# Patient Record
Sex: FEMALE | Race: BLACK OR AFRICAN AMERICAN | NOT HISPANIC OR LATINO | Employment: PART TIME | URBAN - METROPOLITAN AREA
[De-identification: names, ages, dates, MRNs, and addresses within clinical notes are randomized per-mention and may not be internally consistent; named-entity substitution may affect disease eponyms.]

---

## 2020-03-05 ENCOUNTER — OFFICE VISIT (OUTPATIENT)
Dept: OBGYN | Facility: CLINIC | Age: 25
End: 2020-03-05
Attending: OBSTETRICS & GYNECOLOGY
Payer: COMMERCIAL

## 2020-03-05 VITALS
SYSTOLIC BLOOD PRESSURE: 150 MMHG | DIASTOLIC BLOOD PRESSURE: 89 MMHG | WEIGHT: 246.7 LBS | BODY MASS INDEX: 39.65 KG/M2 | HEART RATE: 97 BPM | HEIGHT: 66 IN

## 2020-03-05 DIAGNOSIS — N91.4 SECONDARY OLIGOMENORRHEA: Primary | ICD-10-CM

## 2020-03-05 DIAGNOSIS — Z31.41 FERTILITY TESTING: ICD-10-CM

## 2020-03-05 PROCEDURE — G0463 HOSPITAL OUTPT CLINIC VISIT: HCPCS | Mod: ZF

## 2020-03-05 RX ORDER — MEDROXYPROGESTERONE ACETATE 10 MG
10 TABLET ORAL DAILY
Qty: 5 TABLET | Refills: 3 | Status: SHIPPED | OUTPATIENT
Start: 2020-03-05 | End: 2021-02-05

## 2020-03-05 SDOH — HEALTH STABILITY: MENTAL HEALTH: HOW OFTEN DO YOU HAVE A DRINK CONTAINING ALCOHOL?: NEVER

## 2020-03-05 ASSESSMENT — MIFFLIN-ST. JEOR: SCORE: 1890.77

## 2020-03-05 NOTE — PROGRESS NOTES
CC/HPI:   Armida Da Silva is a 25 year old P0 who presents with c/o primary infertility.   Her LMP was 2/14/2020.  She immigrated from [facility] in 9/19.  She states that her periods were regular prior to immigration.  Since 9/19 have been irregular, occuring every 2 months.  She was seen previously at her primary clinic.  She was given a progestin and did have a withdrawal bleed.  She has been  for 2 years.  She has no history of STIs, pelvic infections or surgeries.  Her  is here with her [place].  He is about 35 years old, healthy, does not have children from previous relationships.     HISTORIES:  There is no problem list on file for this patient.    Past Medical History:   Diagnosis Date     NO ACTIVE PROBLEMS      Past Surgical History:   Procedure Laterality Date     NO HISTORY OF SURGERY       No current outpatient medications on file.     No Known Allergies  Social History     Socioeconomic History     Marital status:      Spouse name: Not on file     Number of children: Not on file     Years of education: Not on file     Highest education level: Not on file   Occupational History     Not on file   Social Needs     Financial resource strain: Not on file     Food insecurity:     Worry: Not on file     Inability: Not on file     Transportation needs:     Medical: Not on file     Non-medical: Not on file   Tobacco Use     Smoking status: Never Smoker     Smokeless tobacco: Never Used   Substance and Sexual Activity     Alcohol use: Never     Frequency: Never     Drug use: Never     Sexual activity: Yes     Partners: Male     Birth control/protection: None   Lifestyle     Physical activity:     Days per week: Not on file     Minutes per session: Not on file     Stress: Not on file   Relationships     Social connections:     Talks on phone: Not on file     Gets together: Not on file     Attends Taoism service: Not on file     Active member of club or organization: Not on file     Attends meetings of  "clubs or organizations: Not on file     Relationship status: Not on file     Intimate partner violence:     Fear of current or ex partner: Not on file     Emotionally abused: Not on file     Physically abused: Not on file     Forced sexual activity: Not on file   Other Topics Concern     Not on file   Social History Narrative     Not on file     No family history on file.       Gyn Hx:   No LMP recorded. (Menstrual status: Amenorrhea).  Menses:   See HPI    Review Of Systems:  CONSTITUTIONAL: NEGATIVE for fever, chills  EYES: NEGATIVE for vision changes   RESP: NEGATIVE for significant cough or SOB  CV: NEGATIVE for chest pain, palpitations   GI: NEGATIVE for nausea, abdominal pain, heartburn, or change in bowel habits  : NEGATIVE for frequency, dysuria, or hematuria  MUSCULOSKELETAL: NEGATIVE for significant arthralgias or myalgia  NEURO: NEGATIVE for weakness, dizziness or paresthesias or headache    EXAM:  BP (!) 150/89   Pulse 97   Ht 1.676 m (5' 6\")   Wt 111.9 kg (246 lb 11.2 oz)   BMI 39.82 kg/m    Body mass index is 39.82 kg/m .    General - pleasant female in no acute distress.  Not examined      ASSESSMENT    26 y/o Luxembourger P0 with c/o irregular periods and primary infertility.  Suspect 2/2 oligo-ovulation/PCOS given clinical history, elevated BMI, elevated BP    PLAN    Day 3 and 21 labs with next cycle.  If no period by day 34-40, she can do a UPT and take Provera for a withdrawal bleed-Rx sent for Provera 10 mg daily for 5 days.  Schedule pelvic ultrasound early in a future cycle.  Plan to RTC after the above are complete to review and discuss next steps.    Martha Flores MD, FACOG      "

## 2020-03-05 NOTE — LETTER
Date:March 17, 2020      Patient was self referred, no letter generated. Do not send.        [facility] Information

## 2020-03-05 NOTE — LETTER
3/5/2020       RE: Armida Da Silva  [address]  [place] [address]     Dear Colleague,    Thank you for referring your patient, Armida Da Silva, to the [facility] at [facility]. Please see a copy of my visit note below.    CC/HPI:   Armida Da Silva is a 25 year old P0 who presents with c/o primary infertility.   Her LMP was 2/14/2020.  She immigrated from [facility] in 9/19.  She states that her periods were regular prior to immigration.  Since 9/19 have been irregular, occuring every 2 months.  She was seen previously at her primary clinic.  She was given a progestin and did have a withdrawal bleed.  She has been  for 2 years.  She has no history of STIs, pelvic infections or surgeries.  Her  is here with her [place].  He is about 35 years old, healthy, does not have children from previous relationships.     HISTORIES:  There is no problem list on file for this patient.    Past Medical History:   Diagnosis Date     NO ACTIVE PROBLEMS      Past Surgical History:   Procedure Laterality Date     NO HISTORY OF SURGERY       No current outpatient medications on file.     No Known Allergies  Social History     Socioeconomic History     Marital status:      Spouse name: Not on file     Number of children: Not on file     Years of education: Not on file     Highest education level: Not on file   Occupational History     Not on file   Social Needs     Financial resource strain: Not on file     Food insecurity:     Worry: Not on file     Inability: Not on file     Transportation needs:     Medical: Not on file     Non-medical: Not on file   Tobacco Use     Smoking status: Never Smoker     Smokeless tobacco: Never Used   Substance and Sexual Activity     Alcohol use: Never     Frequency: Never     Drug use: Never     Sexual activity: Yes     Partners: Male     Birth control/protection: None   Lifestyle     Physical activity:     Days per week: Not on file  "    Minutes per session: Not on file     Stress: Not on file   Relationships     Social connections:     Talks on phone: Not on file     Gets together: Not on file     Attends Evangelical service: Not on file     Active member of club or organization: Not on file     Attends meetings of clubs or organizations: Not on file     Relationship status: Not on file     Intimate partner violence:     Fear of current or ex partner: Not on file     Emotionally abused: Not on file     Physically abused: Not on file     Forced sexual activity: Not on file   Other Topics Concern     Not on file   Social History Narrative     Not on file     No family history on file.       Gyn Hx:   No LMP recorded. (Menstrual status: Amenorrhea).  Menses:   See HPI    Review Of Systems:  CONSTITUTIONAL: NEGATIVE for fever, chills  EYES: NEGATIVE for vision changes   RESP: NEGATIVE for significant cough or SOB  CV: NEGATIVE for chest pain, palpitations   GI: NEGATIVE for nausea, abdominal pain, heartburn, or change in bowel habits  : NEGATIVE for frequency, dysuria, or hematuria  MUSCULOSKELETAL: NEGATIVE for significant arthralgias or myalgia  NEURO: NEGATIVE for weakness, dizziness or paresthesias or headache    EXAM:  BP (!) 150/89   Pulse 97   Ht 1.676 m (5' 6\")   Wt 111.9 kg (246 lb 11.2 oz)   BMI 39.82 kg/m    Body mass index is 39.82 kg/m .    General - pleasant female in no acute distress.  Not examined      ASSESSMENT    24 y/o Micronesian P0 with c/o irregular periods and primary infertility.  Suspect 2/2 oligo-ovulation/PCOS given clinical history, elevated BMI, elevated BP    PLAN    Day 3 and 21 labs with next cycle.  If no period by day 34-40, she can do a UPT and take Provera for a withdrawal bleed-Rx sent for Provera 10 mg daily for 5 days.  Schedule pelvic ultrasound early in a future cycle.  Plan to RTC after the above are complete to review and discuss next steps.    Martha Flores MD, FACOG        Again, thank you for " allowing me to participate in the care of your patient.      Sincerely,    Martha Flores MD

## 2020-03-20 ENCOUNTER — ANCILLARY PROCEDURE (OUTPATIENT)
Dept: ULTRASOUND IMAGING | Facility: CLINIC | Age: 25
End: 2020-03-20
Attending: OBSTETRICS & GYNECOLOGY
Payer: COMMERCIAL

## 2020-03-20 DIAGNOSIS — N91.4 SECONDARY OLIGOMENORRHEA: ICD-10-CM

## 2020-03-20 PROCEDURE — 76830 TRANSVAGINAL US NON-OB: CPT

## 2020-04-01 ENCOUNTER — OFFICE VISIT (OUTPATIENT)
Dept: OBGYN | Facility: CLINIC | Age: 25
End: 2020-04-01
Attending: OBSTETRICS & GYNECOLOGY
Payer: COMMERCIAL

## 2020-04-01 DIAGNOSIS — Z31.41 FERTILITY TESTING: Primary | ICD-10-CM

## 2020-04-01 DIAGNOSIS — Z31.41 FERTILITY TESTING: ICD-10-CM

## 2020-04-01 LAB
ESTRADIOL SERPL-MCNC: 34 PG/ML
FSH SERPL-ACNC: 6.6 IU/L
PROGEST SERPL-MCNC: 0.3 NG/ML
PROLACTIN SERPL-MCNC: 8 UG/L (ref 3–27)
TSH SERPL DL<=0.005 MIU/L-ACNC: 3.51 MU/L (ref 0.4–4)

## 2020-04-01 PROCEDURE — 82670 ASSAY OF TOTAL ESTRADIOL: CPT | Performed by: OBSTETRICS & GYNECOLOGY

## 2020-04-01 PROCEDURE — 84443 ASSAY THYROID STIM HORMONE: CPT | Performed by: OBSTETRICS & GYNECOLOGY

## 2020-04-01 PROCEDURE — 36415 COLL VENOUS BLD VENIPUNCTURE: CPT | Performed by: OBSTETRICS & GYNECOLOGY

## 2020-04-01 PROCEDURE — 83001 ASSAY OF GONADOTROPIN (FSH): CPT | Performed by: OBSTETRICS & GYNECOLOGY

## 2020-04-01 PROCEDURE — 84144 ASSAY OF PROGESTERONE: CPT | Performed by: OBSTETRICS & GYNECOLOGY

## 2020-04-01 PROCEDURE — 84146 ASSAY OF PROLACTIN: CPT | Performed by: OBSTETRICS & GYNECOLOGY

## 2020-04-01 NOTE — PROGRESS NOTES
Patient scheduled for a follow-up visit to review fertility testing labs.  Patient just had labs drawn today and the results are not back.  Plan to reschedule phone visit for tomorrow.  Lesa Mujica MD

## 2020-04-01 NOTE — LETTER
4/1/2020       RE: Armida Da Silva  [address]  [place] [address]     Dear Colleague,    Thank you for referring your patient, Armida Da Silva, to the [facility] at [facility]. Please see a copy of my visit note below.    Patient scheduled for a follow-up visit to review fertility testing labs.  Patient just had labs drawn today and the results are not back.  Plan to reschedule phone visit for tomorrow.  Lesa Mujica MD     Again, thank you for allowing me to participate in the care of your patient.      Sincerely,    Lesa Mujica MD

## 2020-04-02 ENCOUNTER — OFFICE VISIT (OUTPATIENT)
Dept: OBGYN | Facility: CLINIC | Age: 25
End: 2020-04-02
Attending: OBSTETRICS & GYNECOLOGY
Payer: COMMERCIAL

## 2020-04-02 DIAGNOSIS — Z31.41 FERTILITY TESTING: Primary | ICD-10-CM

## 2020-04-02 NOTE — LETTER
"4/2/2020       RE: Armida Da Silva  [address]  [place] [address]     Dear Colleague,    Thank you for referring your patient, Armida Da Silva, to the [facility] at [facility]. Please see a copy of my visit note below.    Billable Phone visit infertility testing follow up    [place] Kong[place]  # [id number]    24 yo Po called to discuss results form recent lab testing.  4/1/20 LMP 3/3p  Prolactin nl, TSH nl, estradiol 34 nl and FSH 6.6 nl.  Progesterone was also drawn on day # 3 so not useful number.  Explained all normal results to pt.  Told her she needs to have day 21 Progesterone done and that I would re-order it.   Instructed pt not to leave house during COVID 19 pandemic.  Told her only to go to lab when \"say Home [place] \" order is lifted. Reviewed how to determine day 21 and which lab.    A/P  24 yo Go undergoing fertility testing  Will re- order progesterone for day 21  Told pt to follow up with Dr. Flores when pandemic is over.    Lesa Mars MD      Total time on phone with pt is 9:40 min    Again, thank you for allowing me to participate in the care of your patient.      Sincerely,    Lesa Mars MD      "

## 2020-04-02 NOTE — PROGRESS NOTES
"Billable Phone visit infertility testing follow up    [place]amo[place]  # [id number]    24 yo Po called to discuss results form recent lab testing.  4/1/20 LMP 3/3p  Prolactin nl, TSH nl, estradiol 34 nl and FSH 6.6 nl.  Progesterone was also drawn on day # 3 so not useful number.  Explained all normal results to pt.  Told her she needs to have day 21 Progesterone done and that I would re-order it.   Instructed pt not to leave house during COVID 19 pandemic.  Told her only to go to lab when \"say Home [place] \" order is lifted. Reviewed how to determine day 21 and which lab.    A/P  24 yo Go undergoing fertility testing  Will re- order progesterone for day 21  Told pt to follow up with Dr. Flores when pandemic is over.    Lesa Mars MD      Total time on phone with pt is 9 min 40 seconds.  "

## 2020-04-13 ENCOUNTER — VIRTUAL VISIT (OUTPATIENT)
Dept: OBGYN | Facility: CLINIC | Age: 25
End: 2020-04-13
Attending: OBSTETRICS & GYNECOLOGY
Payer: COMMERCIAL

## 2020-04-13 DIAGNOSIS — Z31.41 ENCOUNTER FOR FERTILITY TESTING: Primary | ICD-10-CM

## 2020-04-13 NOTE — PROGRESS NOTES
"Armida Da Silva is a 25 year old female who is being evaluated via a billable telephone visit.      The patient has been notified of following:     \"This telephone visit will be conducted via a call between you and your physician/provider. We have found that certain health care needs can be provided without the need for a physical exam.  This service lets us provide the care you need with a short phone conversation.  If a prescription is necessary we can send it directly to your pharmacy.  If lab work is needed we can place an order for that and you can then stop by our lab to have the test done at a later time.    Telephone visits are billed at different rates depending on your insurance coverage. During this emergency period, for some insurers they may be billed the same as an in-person visit.  Please reach out to your insurance provider with any questions.    If during the course of the call the physician/provider feels a telephone visit is not appropriate, you will not be charged for this service.\"    Patient has given verbal consent for Telephone visit?  Yes    How would you like to obtain your AVS? Cristal Ross     Armida Da Silva is a 25 year old P0 who presents today for a phone visit to follow up fertility testing.  This was a 3-way call with a [employer] .  She is mainly calling for the results of her recent ultrasound.  Her day 3 labs were normal, she is due for a day 21 progesterone on 4/20/2020.  She notes that her last period started on 3/31, lasted 2 weeks, just stopped yesterday.       Objective   Reported vitals:  There were no vitals taken for this visit.   PSYCH: Alert and oriented times 3; coherent speech, normal   rate and volume, able to articulate logical thoughts, able   to abstract reason, no tangential thoughts, no hallucinations   or delusions  Her affect is normal  RESP: No cough, no audible wheezing, able to talk in full sentences  Remainder of exam unable to be completed due to " telephone visits       Assessment  26 y/o P0 with primary infertility likely 2/2 PCOS/oligoovulation.  Her ultrasound supports the diagnosis of PCOS.    Plan:    Her ultrasound results were reviewed with her.  Will call the  to schedule a lab appt for her on 4/20 and a follow up phone visit with me on 4/29 at 1:30 pm.  Will review her bleeding then-briefly discussed options for cycle control as we are not doing ovulation induction until after the pandemic.     Phone call duration:  15 minutes    Martha Flores MD, FACOG

## 2020-04-20 DIAGNOSIS — Z31.41 FERTILITY TESTING: ICD-10-CM

## 2020-04-20 LAB — PROGEST SERPL-MCNC: <0.2 NG/ML

## 2020-04-20 PROCEDURE — 36415 COLL VENOUS BLD VENIPUNCTURE: CPT | Performed by: OBSTETRICS & GYNECOLOGY

## 2020-04-20 PROCEDURE — 84144 ASSAY OF PROGESTERONE: CPT | Performed by: OBSTETRICS & GYNECOLOGY

## 2020-04-29 ENCOUNTER — VIRTUAL VISIT (OUTPATIENT)
Dept: OBGYN | Facility: CLINIC | Age: 25
End: 2020-04-29
Attending: OBSTETRICS & GYNECOLOGY
Payer: COMMERCIAL

## 2020-04-29 DIAGNOSIS — Z31.41 ENCOUNTER FOR FERTILITY TESTING: Primary | ICD-10-CM

## 2020-04-29 NOTE — PROGRESS NOTES
"Armida Da Silva is a 25 year old female who is being evaluated via a billable telephone visit.  A [employer]  was used for this conference call.      The patient has been notified of following:     \"This telephone visit will be conducted via a call between you and your physician/provider. We have found that certain health care needs can be provided without the need for a physical exam.  This service lets us provide the care you need with a short phone conversation.  If a prescription is necessary we can send it directly to your pharmacy.  If lab work is needed we can place an order for that and you can then stop by our lab to have the test done at a later time.    Telephone visits are billed at different rates depending on your insurance coverage. During this emergency period, for some insurers they may be billed the same as an in-person visit.  Please reach out to your insurance provider with any questions.    If during the course of the call the physician/provider feels a telephone visit is not appropriate, you will not be charged for this service.\"    Patient has given verbal consent for Telephone visit?  Yes    How would you like to obtain your AVS? Cristal     S:  Pt is a 26 y/o P0 who presents for follow up of fertility testing.   She had recent normal day 3 labs and a day 21 progesterone that was c/w anovulation-as suspected by her clinical history.  She reports a recent episode of bleeding that was 10 days after her LMP.  It was not heavy or painful, she is not interested in anything for cycle control at this time.    O: no vitals taken  gen-pleasant, normal speech and affect    Day 21 progesterone <0.2    A:  26 y/o P0 with primary infertility 2/2 oligo-ovulation/PCOS    P:  Discussed that ovulation induction with oral letrozole would be the next step for her.  Unfortunately at this time infertility treatment is on hold.  Once we are able to see non-urgent clinic visits and start treating infertility, we " can move forward with treatment for her.  She was understanding of this and had no other questions at this time.      Phone call duration: 11:25 minutes    Martha Flores MD, FACOG

## 2020-06-09 ENCOUNTER — TELEPHONE (OUTPATIENT)
Dept: OBGYN | Facility: CLINIC | Age: 25
End: 2020-06-09

## 2020-06-09 NOTE — TELEPHONE ENCOUNTER
----- Message from Lamar Garcia sent at 6/8/2020  3:41 PM CDT -----  Regarding: swollen legs  Contact: [phone]  pts spouse called looking for an appointment due to pt having swollen legs. Please review and follow up with pts spouse    Thanks  Lamar

## 2020-06-09 NOTE — TELEPHONE ENCOUNTER
Spoke with pt spouse who reports for the past week, Armida has had swelling of both legs. She occasionally has pain in feet.  This has never happened to her before.  Denies feet are cold, discolored, SOB. Denies history of heart problems.    Advised she be seen by primary care. They do not have a primary care provider but are planning to go to [facility] clinic for appointment tomorrow.   Advised that if SOB occurs before then, go to ED. He indicated understanding and agreed with plan.    After this, he asked about next steps for fertility.  Per Dr. Flores last note from April, letrozole is next step but is delayed due to covid.  Advised note would be sent to Dr. Flores to advise if this can be started now.  He agreed with plan.

## 2020-06-12 ENCOUNTER — APPOINTMENT (OUTPATIENT)
Dept: INTERPRETER SERVICES | Facility: CLINIC | Age: 25
End: 2020-06-12
Payer: COMMERCIAL

## 2020-06-12 NOTE — TELEPHONE ENCOUNTER
Tried to reach Armida via   Services but received voicemail.  Left message that the medication you requested is not being prescribed now. Please call back with questions.    Martha Flores MD Yesterday (2:18 PM)          I do not believe that we are starting fertility treatment at this time.

## 2020-06-19 ENCOUNTER — TELEPHONE (OUTPATIENT)
Dept: OBGYN | Facility: CLINIC | Age: 25
End: 2020-06-19

## 2020-07-01 ENCOUNTER — TELEPHONE (OUTPATIENT)
Dept: OBGYN | Facility: CLINIC | Age: 25
End: 2020-07-01

## 2020-07-01 NOTE — TELEPHONE ENCOUNTER
----- Message from Maryam Nevarez sent at 7/1/2020  2:36 PM CDT -----  Regarding: Fertitlity and Irregular Cycle  Spouse called [facility], he states pt's period is irregular and was following  up on the fertility treatment. Last appt with Dr Schaeffer was on 04/29/20. Please call spouse to discuss.  Thank you   Maryam

## 2020-07-28 ENCOUNTER — VIRTUAL VISIT (OUTPATIENT)
Dept: OBGYN | Facility: CLINIC | Age: 25
End: 2020-07-28
Attending: OBSTETRICS & GYNECOLOGY
Payer: COMMERCIAL

## 2020-07-28 DIAGNOSIS — N97.0 ANOVULATION: Primary | ICD-10-CM

## 2020-07-28 RX ORDER — LETROZOLE 2.5 MG/1
2.5 TABLET, FILM COATED ORAL DAILY
Qty: 5 TABLET | Refills: 3 | Status: SHIPPED | OUTPATIENT
Start: 2020-07-28 | End: 2021-02-05

## 2020-07-28 NOTE — LETTER
"7/28/2020       RE: Armida Da Silva  [address]  [place] [address]     Dear Colleague,    Thank you for referring your patient, Armida Da Silva, to the [facility] at [facility]. Please see a copy of my visit note below.    25 y.o. Stateless speaking with a  Diagnosis of anovulation who desires to start treatment. LMP 7/25.  Today is day 4 of cycle.    Spent long time describing taking medication and then using [employer] LH predictor kit.  Described timed intercourse night of LH surge and next night. States she does have friend or family member who can read English to help her with the RX,    Took 5 minutes to understand which [facility] she wanted Rx sent to.  Already taking PNV.      O:  No vitals phone visit.  Sounded like she was breathing easily.    A/P Anovulation as cause of infertility  Rx sent for Letrazole  LH predictior kit with timed IC  Repeat phone visit in 1 month,    I am concerned pt did not understand instructions.  Total time on phone 25 min greater than 50% on instruction for timed IC and taking medication.  Lesa Mars MD    The patient has been notified of the following:      \"We have found that certain health care needs can be provided without the need for a face to face visit.  This service lets us provide the care you need with a phone conversation.       I will have full access to your [facility] medical record during this entire phone call.   I will be taking notes for your medical record.      Since this is like an office visit, we will bill your insurance company for this service.       There are potential benefits and risks of telephone visits (e.g. limits to patient confidentiality) that differ from in-person visits.?  Confidentiality still applies for telephone services, and nobody will record the visit.  It is important to be in a quiet, private space that is free of distractions (including cell phone or other " "devices) during the visit.??      If during the course of the call I believe a telephone visit is not appropriate, you will not be charged for this service\"     Consent has been obtained for this service by care team member: Yes       " Statement Selected

## 2020-07-28 NOTE — PROGRESS NOTES
"25 y.o. Ethiopian speaking with a  Diagnosis of anovulation who desires to start treatment. LMP 7/25.  Today is day 4 of cycle.    Spent long time describing taking medication and then using Clear Blue LH predictor kit.  Described timed intercourse night of LH surge and next night. States she does have friend or family member who can read English to help her with the RX,    Took 5 minutes to understand [facility].  Already taking PNV.      O:  No vitals phone visit.  Sounded like she was breathing easily.    A/P Anovulation as cause of infertility  Rx sent for Letrazole  LH predictior kit with timed IC  Repeat phone visit in 1 month,    I am concerned pt did not understand instructions.  Total time on phone 25 min greater than 50% on instruction for timed IC and taking medication.  Lesa Mars MD      The patient has been notified of the following:      \"We have found that certain health care needs can be provided without the need for a face to face visit.  This service lets us provide the care you need with a phone conversation.       I will have full access to your [facility] medical record during this entire phone call.   I will be taking notes for your medical record.      Since this is like an office visit, we will bill your insurance company for this service.       There are potential benefits and risks of telephone visits (e.g. limits to patient confidentiality) that differ from in-person visits.?  Confidentiality still applies for telephone services, and nobody will record the visit.  It is important to be in a quiet, private space that is free of distractions (including cell phone or other devices) during the visit.??      If during the course of the call I believe a telephone visit is not appropriate, you will not be charged for this service\"     Consent has been obtained for this service by care team member: Yes   "

## 2020-09-07 NOTE — PROGRESS NOTES
"Return Infertility Visit:     Armida Da Silva is a 25 year old female who is being evaluated via a billable telephone visit. Telephone visit conducted with telephone .     The patient has been notified of following:     \"This telephone visit will be conducted via a call between you and your physician/provider. We have found that certain health care needs can be provided without the need for a physical exam.  This service lets us provide the care you need with a short phone conversation.  If a prescription is necessary we can send it directly to your pharmacy.  If lab work is needed we can place an order for that and you can then stop by our lab to have the test done at a later time.    Telephone visits are billed at different rates depending on your insurance coverage. During this emergency period, for some insurers they may be billed the same as an in-person visit.  Please reach out to your insurance provider with any questions.    If during the course of the call the physician/provider feels a telephone visit is not appropriate, you will not be charged for this service.\"    What phone number would you like to be contacted at? Mobile # listed    Phone call duration: 25 minutes        26yo patient with infertility secondary to PCOS and anovulation. Last LMP 7/23, she took letrozole Day 5 - Day 10 (instead of 3-7), and had positive OPK 4 days after finishing letrozole. She had intercourse following the positive OPK for the few nights following. She has not had bleeding since then, and had a follow up visit on 9/4 at [facility] clinic with negative UPT. Prior to letrozole had spontaneous period on 7/25 and previous regular menses.     O: No vitals for phone visit. Patient speaking without difficulty.     Day 21 Progesterone 4/20:   <0.2    TVUS 3/20/20:   Polycystic appearing ovaries    A/P:   Armida Da Silva is a 26yo G0 with infertility work up consistent with likely PCOS and anovulation. Positive ovulation with initial " letrozole cycle but without pregnancy. Discussed that this is normal and may take more ovulatory cycles. Will plan to repeat letrozole at same dose for another cycle on Day 3-7.     #Anovulatory infertility  - S/p one cycle letrozole Day 5 - Day 10  - Repeat letrozole 2.5mg Day 3 - Day 7, refill present  - If no spontaneous period by 9/30, patient to follow up by phone for Rx of Provera 10mg for 5 days to initiate withdrawal bleed and then initiate letrozole  - Follow up after next cycle via phone visit    #Hypertension  - 149/70 in [facility] clinic, last BP here 150/89 3/5/20  - Recommend follow up with primary care, patient planning to follow up at [facility] clinic  - Recommended patient to tell provider she is desiring pregnancy    Return for phone visit in 4 weeks after next LMP.     Discussed with Dr. Mars.    Chey Saavedra MD PGY2  Obstetrics & Gynecology  09/06/20   I agree with note as above. The patient was seen in continuity clinic by the resident doctor.  Assessment and plan were jointly made.  Lesa Mars MD

## 2020-09-08 ENCOUNTER — VIRTUAL VISIT (OUTPATIENT)
Dept: OBGYN | Facility: CLINIC | Age: 25
End: 2020-09-08
Payer: COMMERCIAL

## 2020-09-08 DIAGNOSIS — N97.9 INFERTILITY, FEMALE: ICD-10-CM

## 2020-09-08 DIAGNOSIS — E28.2 PCOS (POLYCYSTIC OVARIAN SYNDROME): Primary | ICD-10-CM

## 2020-10-02 ENCOUNTER — TELEPHONE (OUTPATIENT)
Dept: OBGYN | Facility: CLINIC | Age: 25
End: 2020-10-02

## 2020-10-02 NOTE — TELEPHONE ENCOUNTER
Spoke with Armida via   Services. She reports she started her period yesterday and was told to call when it started.    Per Epic, she is to take letrozole day 3-7 and has refills at pharmacy. Gave this information to Armida. Also informed her that Dr. Saavedra wanted a repeat visit after one month.  Encouraged that if current medication cycle results in pregnancy, call for prenatal care. If it doesn't, call for repeat visit with Dr. Saavedra.  She indicated understanding and agreed with plan.

## 2020-10-02 NOTE — TELEPHONE ENCOUNTER
----- Message from Linda Polanco sent at 10/2/2020  9:32 AM CDT -----  Regarding: Started Period  [facility] Call Center    Phone Message    May a detailed message be left on voicemail: yes     Reason for Call: Other: Armida and her  calling to request a call back from the nurses to discuss Claudias medication. Armida started her period yesterday (10/2/20), and they were told they should call in when that happens for her. Armida's  had some questions about medication since Armida stated her period. Please give Armida a call back at your earliest convenience to discuss.     Thank you,  Linda Polanco    Please do not send message and/or reply back to sender. Call Center Representatives do not not respond to messages.

## 2020-10-20 ENCOUNTER — OFFICE VISIT (OUTPATIENT)
Dept: OBGYN | Facility: CLINIC | Age: 25
End: 2020-10-20
Attending: OBSTETRICS & GYNECOLOGY
Payer: COMMERCIAL

## 2020-10-20 VITALS
HEART RATE: 92 BPM | WEIGHT: 249 LBS | BODY MASS INDEX: 40.19 KG/M2 | SYSTOLIC BLOOD PRESSURE: 139 MMHG | DIASTOLIC BLOOD PRESSURE: 83 MMHG

## 2020-10-20 DIAGNOSIS — Z31.41 FERTILITY TESTING: ICD-10-CM

## 2020-10-20 DIAGNOSIS — N97.9 FEMALE INFERTILITY: Primary | ICD-10-CM

## 2020-10-20 LAB — PROGEST SERPL-MCNC: 0.3 NG/ML

## 2020-10-20 PROCEDURE — 84144 ASSAY OF PROGESTERONE: CPT | Performed by: OBSTETRICS & GYNECOLOGY

## 2020-10-20 PROCEDURE — 36415 COLL VENOUS BLD VENIPUNCTURE: CPT | Performed by: OBSTETRICS & GYNECOLOGY

## 2020-10-20 PROCEDURE — 99213 OFFICE O/P EST LOW 20 MIN: CPT | Performed by: OBSTETRICS & GYNECOLOGY

## 2020-10-20 NOTE — LETTER
10/20/2020       RE: Armida Da Silva  [address]  [place] [address]     Dear Colleague,    Thank you for referring your patient, Armida Da Silva, to the [facility]OM[facility] [place] at [facility]. Please see a copy of my visit note below.    S:  Pt is a 26 y/o P0 who presents today for follow up of primary infertility.  She was seen with a [place]  over the phone.  She has been diagnosed with infertility 2/2 oligo-ovulation and was started on letrozole.  She has done 2 letrozole cycles but has not followed up for a luteal phase progesterone to assess ovulation.  Today is day 20 of her second letrozole cycle.        O: /83   Pulse 92   Wt 112.9 kg (249 lb)   Breastfeeding No   BMI 40.19 kg/m        gen-pleasant, NAD    A: Primary infertility 2/2 oligo-ovulation    P:  Day 21 progesterone tomorrow-if ovulatory discussed continuing letrozole for up to 6 cycles, if not then will increase letrozole to 5 mg on cycle days 3-7 with her next cycle.    Martha Flores MD, FACOG    Total visit time was 15 minutes with 10 minutes spent in counseling and coordination of care for fertility treatment .

## 2020-10-20 NOTE — PROGRESS NOTES
S:  Pt is a 24 y/o P0 who presents today for follow up of primary infertility.  She was seen with [facility]  over [facility].  She has been diagnosed with infertility 2/2 oligo-ovulation and was started on letrozole.  She has done 2 letrozole cycles but has not followed up for a luteal phase progesterone to assess ovulation.  Today is day 20 of her second letrozole cycle.        O: /83   Pulse 92   Wt 112.9 kg (249 lb)   Breastfeeding No   BMI 40.19 kg/m        gen-pleasant, NAD    A: Primary infertility 2/2 oligo-ovulation    P:  Day 21 progesterone tomorrow-if ovulatory discussed continuing letrozole for up to 6 cycles, if not then will increase letrozole to 5 mg on cycle days 3-7 with her next cycle.    Martha Flores MD, FACOG    Total visit time was 15 minutes with 10 minutes spent in counseling and coordination of care for fertility treatment .

## 2020-11-03 ENCOUNTER — VIRTUAL VISIT (OUTPATIENT)
Dept: OBGYN | Facility: CLINIC | Age: 25
End: 2020-11-03
Attending: OBSTETRICS & GYNECOLOGY
Payer: COMMERCIAL

## 2020-11-03 DIAGNOSIS — N97.0 ANOVULATION: Primary | ICD-10-CM

## 2020-11-03 PROCEDURE — 99213 OFFICE O/P EST LOW 20 MIN: CPT | Mod: 95 | Performed by: OBSTETRICS & GYNECOLOGY

## 2020-11-03 RX ORDER — LETROZOLE 2.5 MG/1
TABLET, FILM COATED ORAL
Qty: 10 TABLET | Refills: 0 | Status: SHIPPED | OUTPATIENT
Start: 2020-11-03 | End: 2021-02-05

## 2020-11-03 NOTE — PROGRESS NOTES
"Gynecology Visit Note  11/3/2020    SUBJECTIVE     Armida is a 25 year old female who is being evaluated via a billable telephone visit.    Patient opted to conduct today's return visit via telephone secondary to the COVID-19 pandemic vs. an in person visit to the clinic.    I spoke with: Armida Da Silva    The patient has been notified of following:   \"This telephone visit will be conducted via a call between you and your physician/provider. We have found that certain health care needs can be provided without the need for a physical exam.  This service lets us provide the care you need with a short phone conversation.  If a prescription is necessary we can send it directly to your pharmacy.  If lab work is needed we can place an order for that and you can then stop by our lab to have the test done at a later time.  If during the course of the call the physician/provider feels a telephone visit is not appropriate, you will not be charged for this service.\"     The reason for the telephone visit: Follow-up infertility    S: Patient is a 24 yo nulligravid female who has been following in our clinic for primary infertility.  Patient has been using letrozole for ovulation induction x 2 cycles now.  She recently had a Day 21 progesterone level checked on 10/20/2020 which was 0.3.  Plan made at that time was to increase to Letrozole 5 mg Days 3-7 for next cycle if not ovulatory.      Today, patient states she is doing well.  Patient states her LMP was 10/1/2020.  She has not yet taken a pregnancy test.  Has not had spontaneous menses yet this month.  She does not always take progesterone to induce a withdrawal bleed.  She states she would prefer to await spontaneous menses.  Wondering about the results of her blood work.         O:  No vitals as remote visit    General: NAD, A&Ox3  Resp: Non-labored breathing    A/P: 24 yo nulligravid female being called to discuss recent D#21 progesterone results showing anovulation on letrozole " 2.5 mg dose, plan to increase to 5 mg for next cycle  1) Anovulation: Discussed Day 21 progesterone without evidence of ovulation on letrozole 2.5 mg dose days 3-7.  Discussed recommendation to increase to letrozole 5 mg daily on days 3-7 for this next cycle.  She desires to proceed.  Should have repeat progesterone level drawn on day 21 of next cycle to see if she ovulates with this dose of letrozole.  Discussed if she does not get menses in next 2 weeks to take a pregnancy test.  She should call us with result and if negative, can consider provera 10 mg x 10 days to help induce withdrawal bleed as she desires.  Patient understands and is agreeable with this plan.      Phone call start: 1:31 PM  Phone call end: 1:45 PM  Phone call duration:  14 minutes    Domenica Roth MD

## 2020-11-03 NOTE — LETTER
"11/3/2020       RE: Armida Da Silva  [address]  [place] [address]     Dear Colleague,    Thank you for referring your patient, Armida Da Silva, to the [facility]OMEN[facility] CLIN[facility] [place] at [facility]. Please see a copy of my visit note below.    Gynecology Visit Note  11/3/2020    SUBJECTIVE     Armida is a 25 year old female who is being evaluated via a billable telephone visit.    Patient opted to conduct today's return visit via telephone secondary to the COVID-19 pandemic vs. an in person visit to the clinic.    I spoke with: Armida Da Silva    The patient has been notified of following:   \"This telephone visit will be conducted via a call between you and your physician/provider. We have found that certain health care needs can be provided without the need for a physical exam.  This service lets us provide the care you need with a short phone conversation.  If a prescription is necessary we can send it directly to your pharmacy.  If lab work is needed we can place an order for that and you can then stop by our lab to have the test done at a later time.  If during the course of the call the physician/provider feels a telephone visit is not appropriate, you will not be charged for this service.\"     The reason for the telephone visit: Follow-up infertility    S: Patient is a 26 yo nulligravid female who has been following in our clinic for primary infertility.  Patient has been using letrozole for ovulation induction x 2 cycles now.  She recently had a Day 21 progesterone level checked on 10/20/2020 which was 0.3.  Plan made at that time was to increase to Letrozole 5 mg Days 3-7 for next cycle if not ovulatory.      Today, patient states she is doing well.  Patient states her LMP was 10/1/2020.  She has not yet taken a pregnancy test.  Has not had spontaneous menses yet this month.  She does not always take progesterone to induce a withdrawal bleed.  She states she " would prefer to await spontaneous menses.  Wondering about the results of her blood work.         O:  No vitals as remote visit    General: NAD, A&Ox3  Resp: Non-labored breathing    A/P: 24 yo nulligravid female being called to discuss recent D#21 progesterone results showing anovulation on letrozole 2.5 mg dose, plan to increase to 5 mg for next cycle  1) Anovulation: Discussed Day 21 progesterone without evidence of ovulation on letrozole 2.5 mg dose days 3-7.  Discussed recommendation to increase to letrozole 5 mg daily on days 3-7 for this next cycle.  She desires to proceed.  Should have repeat progesterone level drawn on day 21 of next cycle to see if she ovulates with this dose of letrozole.  Discussed if she does not get menses in next 2 weeks to take a pregnancy test.  She should call us with result and if negative, can consider provera 10 mg x 10 days to help induce withdrawal bleed as she desires.  Patient understands and is agreeable with this plan.      Phone call start: 1:31 PM  Phone call end: 1:45 PM  Phone call duration:  14 minutes    Domenica Roth MD

## 2020-11-20 ENCOUNTER — TELEPHONE (OUTPATIENT)
Dept: OBGYN | Facility: CLINIC | Age: 25
End: 2020-11-20

## 2020-11-20 DIAGNOSIS — N97.0 ANOVULATION: Primary | ICD-10-CM

## 2020-11-20 RX ORDER — MEDROXYPROGESTERONE ACETATE 10 MG
10 TABLET ORAL DAILY
Qty: 10 TABLET | Refills: 0 | Status: SHIPPED | OUTPATIENT
Start: 2020-11-20 | End: 2020-11-30

## 2020-11-20 NOTE — TELEPHONE ENCOUNTER
----- Message from Linda Polanco sent at 11/19/2020  2:37 PM CST -----  Regarding: Medication Question  [facility] Call Center    Phone Message    May a detailed message be left on voicemail: yes     Reason for Call: Other: Armida's  calling to request a call back from Dr. Roth's care team. Armida's  has a question about the letrozole (FEMARA) 2.5 MG tablet medication. Armida and her  thought it was supposed to be for a different amount. Please give Armida and her  a call back at your earliest convenience to discuss.    Thank you,  Linda Polanco    Please do not send message and/or reply back to sender. Call Center Representatives do not not respond to messages.

## 2020-11-20 NOTE — TELEPHONE ENCOUNTER
Called to Gege, who conferenced Armida in on the call.     They are concerned that she was supposed to increase letrozole to 5 mg, but picked up prescription, and it was same 2.5 mg tabs.  I explained that she is to take 2 tablets per day for 5 days, which will provide the 5 mg dose.     She states last menstrual period was 10/1/20.  She took a UPT this AM, and it was negative.  She would like to Start provera as discussed at last visit with Dr Roth.  Order sent to pharmacy.  I explained that she would start menses after completion of the full 10 days of medication.

## 2020-11-30 ENCOUNTER — TELEPHONE (OUTPATIENT)
Dept: OBGYN | Facility: CLINIC | Age: 25
End: 2020-11-30

## 2020-11-30 NOTE — TELEPHONE ENCOUNTER
----- Message from Lucy May sent at 11/30/2020 10:35 AM CST -----  Regarding: Prescription question  Hi,    Patients  Gege is calling with questions about a prescription she was prescribed and how she is taking it? Please call him at [phone].    Thanks,    Lucy

## 2020-11-30 NOTE — TELEPHONE ENCOUNTER
Armida has finished her provera, and is currently on day 3 of her period.  Advised that she will start letrozole, 2 tabs per day for 5 days.    Gege states that he has no additional questions at this time

## 2021-01-07 ENCOUNTER — HOSPITAL ENCOUNTER (OUTPATIENT)
Dept: ULTRASOUND IMAGING | Facility: CLINIC | Age: 26
Discharge: HOME | End: 2021-01-07
Attending: STUDENT IN AN ORGANIZED HEALTH CARE EDUCATION/TRAINING PROGRAM | Admitting: STUDENT IN AN ORGANIZED HEALTH CARE EDUCATION/TRAINING PROGRAM
Payer: COMMERCIAL

## 2021-01-07 DIAGNOSIS — N91.2 AMENORRHEA: ICD-10-CM

## 2021-01-07 PROCEDURE — 76801 OB US < 14 WKS SINGLE FETUS: CPT | Mod: 26 | Performed by: RADIOLOGY

## 2021-01-07 PROCEDURE — 76817 TRANSVAGINAL US OBSTETRIC: CPT | Mod: 26 | Performed by: RADIOLOGY

## 2021-01-07 PROCEDURE — 76801 OB US < 14 WKS SINGLE FETUS: CPT

## 2021-02-05 ENCOUNTER — ANCILLARY PROCEDURE (OUTPATIENT)
Dept: ULTRASOUND IMAGING | Facility: CLINIC | Age: 26
End: 2021-02-05
Attending: ADVANCED PRACTICE MIDWIFE
Payer: COMMERCIAL

## 2021-02-05 ENCOUNTER — OFFICE VISIT (OUTPATIENT)
Dept: OBGYN | Facility: CLINIC | Age: 26
End: 2021-02-05
Attending: ADVANCED PRACTICE MIDWIFE
Payer: COMMERCIAL

## 2021-02-05 VITALS
WEIGHT: 239.5 LBS | SYSTOLIC BLOOD PRESSURE: 136 MMHG | HEIGHT: 66 IN | HEART RATE: 98 BPM | BODY MASS INDEX: 38.49 KG/M2 | DIASTOLIC BLOOD PRESSURE: 84 MMHG

## 2021-02-05 DIAGNOSIS — Z34.01 ENCOUNTER FOR SUPERVISION OF NORMAL FIRST PREGNANCY IN FIRST TRIMESTER: Primary | ICD-10-CM

## 2021-02-05 DIAGNOSIS — Z34.01 ENCOUNTER FOR SUPERVISION OF NORMAL FIRST PREGNANCY IN FIRST TRIMESTER: ICD-10-CM

## 2021-02-05 DIAGNOSIS — O09.91 SUPERVISION OF HIGH RISK PREGNANCY IN FIRST TRIMESTER: Primary | ICD-10-CM

## 2021-02-05 DIAGNOSIS — E66.812 CLASS 2 OBESITY WITH BODY MASS INDEX (BMI) OF 38.0 TO 38.9 IN ADULT, UNSPECIFIED OBESITY TYPE, UNSPECIFIED WHETHER SERIOUS COMORBIDITY PRESENT: ICD-10-CM

## 2021-02-05 DIAGNOSIS — Z34.80 SUPERVISION OF OTHER NORMAL PREGNANCY: ICD-10-CM

## 2021-02-05 LAB
ABO + RH BLD: NORMAL
ABO + RH BLD: NORMAL
BLD GP AB SCN SERPL QL: NORMAL
BLOOD BANK CMNT PATIENT-IMP: NORMAL
DEPRECATED CALCIDIOL+CALCIFEROL SERPL-MC: 16 UG/L (ref 20–75)
ERYTHROCYTE [DISTWIDTH] IN BLOOD BY AUTOMATED COUNT: 13.2 % (ref 10–15)
HBA1C MFR BLD: 5.6 % (ref 0–5.6)
HBV SURFACE AB SERPL IA-ACNC: 0 M[IU]/ML
HBV SURFACE AG SERPL QL IA: NONREACTIVE
HCT VFR BLD AUTO: 41.1 % (ref 35–47)
HCV AB SERPL QL IA: NONREACTIVE
HGB BLD-MCNC: 13.7 G/DL (ref 11.7–15.7)
HIV 1+2 AB+HIV1 P24 AG SERPL QL IA: NONREACTIVE
MCH RBC QN AUTO: 27.8 PG (ref 26.5–33)
MCHC RBC AUTO-ENTMCNC: 33.3 G/DL (ref 31.5–36.5)
MCV RBC AUTO: 83 FL (ref 78–100)
PLATELET # BLD AUTO: 419 10E9/L (ref 150–450)
RBC # BLD AUTO: 4.93 10E12/L (ref 3.8–5.2)
SPECIMEN EXP DATE BLD: NORMAL
T PALLIDUM AB SER QL: NONREACTIVE
WBC # BLD AUTO: 7.9 10E9/L (ref 4–11)

## 2021-02-05 PROCEDURE — 99207 PR PRENATAL VISIT: CPT | Performed by: ADVANCED PRACTICE MIDWIFE

## 2021-02-05 PROCEDURE — 86900 BLOOD TYPING SEROLOGIC ABO: CPT | Performed by: ADVANCED PRACTICE MIDWIFE

## 2021-02-05 PROCEDURE — 82306 VITAMIN D 25 HYDROXY: CPT | Performed by: ADVANCED PRACTICE MIDWIFE

## 2021-02-05 PROCEDURE — 87086 URINE CULTURE/COLONY COUNT: CPT | Performed by: ADVANCED PRACTICE MIDWIFE

## 2021-02-05 PROCEDURE — 87389 HIV-1 AG W/HIV-1&-2 AB AG IA: CPT | Performed by: ADVANCED PRACTICE MIDWIFE

## 2021-02-05 PROCEDURE — 86706 HEP B SURFACE ANTIBODY: CPT | Performed by: ADVANCED PRACTICE MIDWIFE

## 2021-02-05 PROCEDURE — G0463 HOSPITAL OUTPT CLINIC VISIT: HCPCS | Mod: 25

## 2021-02-05 PROCEDURE — 76801 OB US < 14 WKS SINGLE FETUS: CPT

## 2021-02-05 PROCEDURE — 36415 COLL VENOUS BLD VENIPUNCTURE: CPT | Performed by: ADVANCED PRACTICE MIDWIFE

## 2021-02-05 PROCEDURE — 86803 HEPATITIS C AB TEST: CPT | Performed by: ADVANCED PRACTICE MIDWIFE

## 2021-02-05 PROCEDURE — 85027 COMPLETE CBC AUTOMATED: CPT | Performed by: ADVANCED PRACTICE MIDWIFE

## 2021-02-05 PROCEDURE — 83021 HEMOGLOBIN CHROMOTOGRAPHY: CPT | Performed by: ADVANCED PRACTICE MIDWIFE

## 2021-02-05 PROCEDURE — 86780 TREPONEMA PALLIDUM: CPT | Performed by: ADVANCED PRACTICE MIDWIFE

## 2021-02-05 PROCEDURE — 86901 BLOOD TYPING SEROLOGIC RH(D): CPT | Performed by: ADVANCED PRACTICE MIDWIFE

## 2021-02-05 PROCEDURE — 86762 RUBELLA ANTIBODY: CPT | Performed by: ADVANCED PRACTICE MIDWIFE

## 2021-02-05 PROCEDURE — 76801 OB US < 14 WKS SINGLE FETUS: CPT | Mod: 26 | Performed by: OBSTETRICS & GYNECOLOGY

## 2021-02-05 PROCEDURE — 83036 HEMOGLOBIN GLYCOSYLATED A1C: CPT | Performed by: ADVANCED PRACTICE MIDWIFE

## 2021-02-05 PROCEDURE — 87340 HEPATITIS B SURFACE AG IA: CPT | Performed by: ADVANCED PRACTICE MIDWIFE

## 2021-02-05 PROCEDURE — 86850 RBC ANTIBODY SCREEN: CPT | Performed by: ADVANCED PRACTICE MIDWIFE

## 2021-02-05 PROCEDURE — 86787 VARICELLA-ZOSTER ANTIBODY: CPT | Performed by: ADVANCED PRACTICE MIDWIFE

## 2021-02-05 RX ORDER — ACETAMINOPHEN 325 MG/1
325-650 TABLET ORAL EVERY 8 HOURS PRN
Qty: 30 TABLET | Refills: 0 | Status: SHIPPED | OUTPATIENT
Start: 2021-02-05 | End: 2022-01-27

## 2021-02-05 RX ORDER — ONDANSETRON 4 MG/1
4 TABLET, ORALLY DISINTEGRATING ORAL EVERY 8 HOURS PRN
Qty: 30 TABLET | Refills: 1 | Status: SHIPPED | OUTPATIENT
Start: 2021-02-05 | End: 2022-01-27

## 2021-02-05 RX ORDER — ASPIRIN 81 MG
100 TABLET, DELAYED RELEASE (ENTERIC COATED) ORAL DAILY
Qty: 60 TABLET | Refills: 0 | Status: SHIPPED | OUTPATIENT
Start: 2021-02-05 | End: 2021-03-25

## 2021-02-05 SDOH — ECONOMIC STABILITY: FOOD INSECURITY: WITHIN THE PAST 12 MONTHS, THE FOOD YOU BOUGHT JUST DIDN'T LAST AND YOU DIDN'T HAVE MONEY TO GET MORE.: NOT ASKED

## 2021-02-05 SDOH — ECONOMIC STABILITY: INCOME INSECURITY: HOW HARD IS IT FOR YOU TO PAY FOR THE VERY BASICS LIKE FOOD, HOUSING, MEDICAL CARE, AND HEATING?: NOT ASKED

## 2021-02-05 SDOH — ECONOMIC STABILITY: TRANSPORTATION INSECURITY
IN THE PAST 12 MONTHS, HAS LACK OF TRANSPORTATION KEPT YOU FROM MEETINGS, WORK, OR FROM GETTING THINGS NEEDED FOR DAILY LIVING?: NOT ASKED

## 2021-02-05 SDOH — ECONOMIC STABILITY: TRANSPORTATION INSECURITY
IN THE PAST 12 MONTHS, HAS THE LACK OF TRANSPORTATION KEPT YOU FROM MEDICAL APPOINTMENTS OR FROM GETTING MEDICATIONS?: NOT ASKED

## 2021-02-05 SDOH — ECONOMIC STABILITY: FOOD INSECURITY: WITHIN THE PAST 12 MONTHS, YOU WORRIED THAT YOUR FOOD WOULD RUN OUT BEFORE YOU GOT MONEY TO BUY MORE.: NOT ASKED

## 2021-02-05 ASSESSMENT — PAIN SCALES - GENERAL: PAINLEVEL: NO PAIN (0)

## 2021-02-05 ASSESSMENT — MIFFLIN-ST. JEOR: SCORE: 1853.11

## 2021-02-05 NOTE — LETTER
2021       RE: Armida Da Silva  [address]  [place] [address]     Dear Colleague,    Thank you for referring your patient, Armida Da Silva, to the [facility]OMEN[facility] CLIN[facility] [place] at [facility]. Please see a copy of my visit note below.    [facility] OB Intake note  Subjective   26 year old femalepresents to clinic for initiation of OB care. Patient's last menstrual period was 2020.  at 10+0 d by Estimated Date of Delivery: Sep 3, 2021 based on LMP. Reviewed dating ultrasound. Pregnancy is planned.    Partner name -Claudia Yepez.        Symptoms since LMP include nausea. Patient has tried these relief measures: diet modification, small frequent meals, increased rest and increased fluids.    - Genetic/Infection questionnaire completed, risks include : hbg electrophoresis, varicella nonimmune  Pt  does not have a recent known exposure to Parvo or CMV so IgG/IgM testing WILL NOT be ordered.   Recommended Flu Vaccine.  Patient already received Flu Vaccine  Have you traveled during the pregnancy?No  Have your sexual partner(s) travelled during the pregnancy?No    OTHER:  - DM2 vs prediabetes   - Pt reports diagnosis of diabetes,  managed by [facility]   - currently on metformin   - States she was not told to check BG values; gets hbga1c q3 months   - HbgA1c - 5.2  - Hx PCOS  - BMI 38  - was on letrozole and progesterone    - Current Medications    Current Outpatient Medications   Medication Sig Dispense Refill     acetaminophen (TYLENOL) 325 MG tablet Take 1-2 tablets (325-650 mg) by mouth every 8 hours as needed for mild pain 30 tablet 0     aspirin (ASA) 81 MG EC tablet Take 1 tablet (81 mg) by mouth daily Start after 12 weeks of pregnancy 90 tablet 1     docusate sodium (COLACE) 100 MG tablet Take 1 tablet (100 mg) by mouth daily May take up to 2 times a day 60 tablet 0     metFORMIN (GLUCOPHAGE) 850 MG tablet TAKE 1 TABLET BY MOUTH ONCE  DAILY WITH BREAKFAST       ondansetron (ZOFRAN-ODT) 4 MG ODT tab Take 1 tablet (4 mg) by mouth every 8 hours as needed for nausea 30 tablet 1     vitamin D3 (CHOLECALCIFEROL) 125 MCG (5000 UT) tablet Take 1 tablet (125 mcg) by mouth daily 60 tablet 3         - Co-morbids    Past Medical History:   Diagnosis Date     Obesity, BMI 38      PCOS (polycystic ovarian syndrome)      Pre-diabetes      - Risk for GDM : Pre pregnancy BMI>30 and Personal h/o prediabetes/glucose intolerance or PCOSso  WILL have Hgb A1C    - High risk factors for Pre E-  No known risk factors of High risk for Pre E       - Moderate risk factor for Pre E Pre Pregnancy body mass index >30  and Sociodemographic characteristics (Racism, Less access given lower SES)  Meets one high risk factors or two  of the moderate risk facrtors  so WILL consider starting low dose aspirin (81mg) starting between 12 and 28 weeks to prevent early onset preeclampsia    - The patient  does not have a history of spontaneous  birth so  WILL NOT consider progesterone starting at 16-20 weeks and/or serial transvaginal cervical length ultrasounds from 16-24 weeks.     -The patient does not have a history of immunosuppresion or HIV so Toxoplasma IgG/IgM WILL NOT be ordered.     PERSONAL/SOCIAL HISTORY  - Clarissam Marleni    History of anxiety or depression  denies- if Yes, therapy/medication/hospitalization- n/a  Additional items: Denies past or present domestic violence, sexual and psychological abuse.    Objective  -VS: reviewed and within normal limits   -General appearance: no acute distress, patient is comfortable   NEUROLOGICAL/PSYCHIATRIC   - Orientated x3,   -Mood and affect: : normal     Pap 2020- NIL    Assessment/Plan  Armida was seen today for prenatal care.    Diagnoses and all orders for this visit:    Supervision of high risk pregnancy in first trimester  -     ABO/Rh type and screen  -     CBC with platelets  -     Vitamin D Deficiency  -      Rubella Antibody IgG Quantitative  -     Treponema Abs w Reflex to RPR and Titer  -     HIV Antigen Antibody Combo  -     Hepatitis B surface antigen  -     Hepatitis B Surface Antibody  -     Hepatitis C antibody  -     Urine Culture Aerobic Bacterial  -     Hemoglobin A1c  -     Hgb with reflex to ELP or RBC Solubility (Sickle Cell Screen)  -     Varicella Zoster Virus Antibody IgG  -     ondansetron (ZOFRAN-ODT) 4 MG ODT tab; Take 1 tablet (4 mg) by mouth every 8 hours as needed for nausea  -     docusate sodium (COLACE) 100 MG tablet; Take 1 tablet (100 mg) by mouth daily May take up to 2 times a day  -     acetaminophen (TYLENOL) 325 MG tablet; Take 1-2 tablets (325-650 mg) by mouth every 8 hours as needed for mild pain  -     Discontinue: aspirin (ASA) 81 MG EC tablet; Take 1 tablet (81 mg) by mouth daily  -     MAT FETAL MED CTR REFERRAL-PREGNANCY  -     aspirin (ASA) 81 MG EC tablet; Take 1 tablet (81 mg) by mouth daily Start after 12 weeks of pregnancy    Class 2 obesity with body mass index (BMI) of 38.0 to 38.9 in adult, unspecified obesity type, unspecified whether serious comorbidity present        26 year old  11w2d weeks of pregnancy with NICKY of Sep 3, 2021 by LMP of Patient's last menstrual period was 2020.. Ultrasound confirms.   Outpatient Encounter Medications as of 2021   Medication Sig Dispense Refill     acetaminophen (TYLENOL) 325 MG tablet Take 1-2 tablets (325-650 mg) by mouth every 8 hours as needed for mild pain 30 tablet 0     aspirin (ASA) 81 MG EC tablet Take 1 tablet (81 mg) by mouth daily Start after 12 weeks of pregnancy 90 tablet 1     docusate sodium (COLACE) 100 MG tablet Take 1 tablet (100 mg) by mouth daily May take up to 2 times a day 60 tablet 0     metFORMIN (GLUCOPHAGE) 850 MG tablet TAKE 1 TABLET BY MOUTH ONCE DAILY WITH BREAKFAST       ondansetron (ZOFRAN-ODT) 4 MG ODT tab Take 1 tablet (4 mg) by mouth every 8 hours as needed for nausea 30 tablet 1      [DISCONTINUED] aspirin (ASA) 81 MG EC tablet Take 1 tablet (81 mg) by mouth daily 90 tablet 1     [DISCONTINUED] letrozole (FEMARA) 2.5 MG tablet Take 2 tablets each day on days 3-7 of your menstrual cycle 10 tablet 0     [DISCONTINUED] letrozole (FEMARA) 2.5 MG tablet Take 1 tablet (2.5 mg) by mouth daily 5 tablet 3     [DISCONTINUED] medroxyPROGESTERone (PROVERA) 10 MG tablet Take 1 tablet (10 mg) by mouth daily 5 tablet 3     No facility-administered encounter medications on file as of 2/5/2021.       Orders Placed This Encounter   Procedures     CBC with platelets     Vitamin D Deficiency     Rubella Antibody IgG Quantitative     Treponema Abs w Reflex to RPR and Titer     HIV Antigen Antibody Combo     Hepatitis B surface antigen     Hepatitis B Surface Antibody     Hepatitis C antibody     Hemoglobin A1c     Hgb with reflex to ELP or RBC Solubility (Sickle Cell Screen)     Varicella Zoster Virus Antibody IgG     MAT FETAL MED CTR REFERRAL-PREGNANCY     ABO/Rh type and screen                 Orders Placed This Encounter   Procedures     CBC with platelets     Vitamin D Deficiency     Rubella Antibody IgG Quantitative     Treponema Abs w Reflex to RPR and Titer     HIV Antigen Antibody Combo     Hepatitis B surface antigen     Hepatitis B Surface Antibody     Hepatitis C antibody     Hemoglobin A1c     Hgb with reflex to ELP or RBC Solubility (Sickle Cell Screen)     Varicella Zoster Virus Antibody IgG     MAT FETAL MED CTR REFERRAL-PREGNANCY     ABO/Rh type and screen       - Oriented to Practice, types of care, and how to reach a provider.  Pt prefers Undecided between CNM and MD, will continue to consider.   - Patient received 1st trimester new OB education packet complete with aide of [employer] Expectant Family booklet including information on genetic screening test options.  - Patient desires level II ultrasound which was ordered.  - Educational handout on the prevention of infections diseases during pregnancy  provided.  - Patient was encouraged to start prenatal vitamins as tolerated.      - Pregnancy concerns to be addressed by provider at new OB exam include: diabetes vs pre-diabetes, start aspirin    - OBI education reviewed.  - OBI labs ordered.  - Pap up to date.  - Hemoglobin A1c added to labs.   Further review diabetes hx with MD at next appointment and determine recommendations for testing, metformin, pregnancy monitoring.  - Reviewed recommendation for low dose aspirin daily to prevent pre eclampsia. Prescription for aspirin sent. To start after 12 weeks.   - Tylenol and colace prescription sent.  - MFM referral placed for level 2 ultrasound.    Pt to RTO for NOB visit in 2 weeks with OB resident and prn if questions or concerns    Placido Taylor CNM    Addendum:  After further review of CareEverywhere records, note of elevated BPs without diagnosis of hypertension.  Will need baseline pre-e labs and plan.    DEEPIKA Hartley CNM

## 2021-02-06 DIAGNOSIS — E55.9 VITAMIN D DEFICIENCY: Primary | ICD-10-CM

## 2021-02-06 LAB
BACTERIA SPEC CULT: NORMAL
Lab: NORMAL
RUBV IGG SERPL IA-ACNC: >250 IU/ML
SPECIMEN SOURCE: NORMAL
VZV IGG SER QL IA: 4.8 AI (ref 0–0.8)

## 2021-02-07 LAB
HGB A1 MFR BLD: 96.4 % (ref 95–97.9)
HGB A2 MFR BLD: 3.2 % (ref 2–3.5)
HGB C MFR BLD: 0 % (ref 0–0)
HGB E MFR BLD: 0 % (ref 0–0)
HGB F MFR BLD: 0.4 % (ref 0–2.1)
HGB FRACT BLD ELPH-IMP: NORMAL
HGB OTHER MFR BLD: 0 % (ref 0–0)
HGB S BLD QL SOLY: NORMAL
HGB S MFR BLD: 0 % (ref 0–0)
PATH INTERP BLD-IMP: NORMAL

## 2021-02-08 ENCOUNTER — APPOINTMENT (OUTPATIENT)
Dept: INTERPRETER SERVICES | Facility: CLINIC | Age: 26
End: 2021-02-08
Payer: COMMERCIAL

## 2021-02-08 ENCOUNTER — TELEPHONE (OUTPATIENT)
Dept: OBGYN | Facility: CLINIC | Age: 26
End: 2021-02-08

## 2021-02-08 NOTE — TELEPHONE ENCOUNTER
Called Armida with assist of [facility] .  Reviewed lab results, and recommendation to supplement vitamin D.    All questions answered.

## 2021-02-08 NOTE — TELEPHONE ENCOUNTER
----- Message from Yaneli Jaimes RN sent at 2/8/2021 11:27 AM CST -----  Regarding: Inquiring about test results

## 2021-02-14 PROBLEM — O09.91 SUPERVISION OF HIGH RISK PREGNANCY IN FIRST TRIMESTER: Status: ACTIVE | Noted: 2021-02-14

## 2021-02-14 PROBLEM — R03.0 ELEVATED BP WITHOUT DIAGNOSIS OF HYPERTENSION: Status: ACTIVE | Noted: 2020-09-04

## 2021-02-14 PROBLEM — Z91.89 AT RISK FOR FERTILITY PROBLEMS: Status: ACTIVE | Noted: 2020-09-04

## 2021-02-14 PROBLEM — R73.03 PREDIABETES: Status: ACTIVE | Noted: 2020-09-29

## 2021-02-15 NOTE — PROGRESS NOTES
[facility] OB Intake note  Subjective   26 year old femalepresents to clinic for initiation of OB care. Patient's last menstrual period was 2020.  at 10+0 d by Estimated Date of Delivery: Sep 3, 2021 based on LMP. Reviewed dating ultrasound. Pregnancy is planned.    Partner name -Claudia Yepez.        Symptoms since LMP include nausea. Patient has tried these relief measures: diet modification, small frequent meals, increased rest and increased fluids.    - Genetic/Infection questionnaire completed, risks include : hbg electrophoresis, varicella nonimmune  Pt  does not have a recent known exposure to Parvo or CMV so IgG/IgM testing WILL NOT be ordered.   Recommended Flu Vaccine.  Patient already received Flu Vaccine  Have you traveled during the pregnancy?No  Have your sexual partner(s) travelled during the pregnancy?No    OTHER:  - DM2 vs prediabetes   - Pt reports diagnosis of diabetes,  managed by [facility]   - currently on metformin   - States she was not told to check BG values; gets hbga1c q3 months   - HbgA1c - 5.2  - Hx PCOS  - BMI 38  - was on letrozole and progesterone    - Current Medications    Current Outpatient Medications   Medication Sig Dispense Refill     acetaminophen (TYLENOL) 325 MG tablet Take 1-2 tablets (325-650 mg) by mouth every 8 hours as needed for mild pain 30 tablet 0     aspirin (ASA) 81 MG EC tablet Take 1 tablet (81 mg) by mouth daily Start after 12 weeks of pregnancy 90 tablet 1     docusate sodium (COLACE) 100 MG tablet Take 1 tablet (100 mg) by mouth daily May take up to 2 times a day 60 tablet 0     metFORMIN (GLUCOPHAGE) 850 MG tablet TAKE 1 TABLET BY MOUTH ONCE DAILY WITH BREAKFAST       ondansetron (ZOFRAN-ODT) 4 MG ODT tab Take 1 tablet (4 mg) by mouth every 8 hours as needed for nausea 30 tablet 1     vitamin D3 (CHOLECALCIFEROL) 125 MCG (5000 UT) tablet Take 1 tablet (125 mcg) by mouth daily 60 tablet 3         - Co-morbids    Past Medical History:   Diagnosis  Date     Obesity, BMI 38      PCOS (polycystic ovarian syndrome)      Pre-diabetes      - Risk for GDM : Pre pregnancy BMI>30 and Personal h/o prediabetes/glucose intolerance or PCOSso  WILL have Hgb A1C    - High risk factors for Pre E-  No known risk factors of High risk for Pre E       - Moderate risk factor for Pre E Pre Pregnancy body mass index >30  and Sociodemographic characteristics (Racism, Less access given lower SES)  Meets one high risk factors or two  of the moderate risk facrtors  so WILL consider starting low dose aspirin (81mg) starting between 12 and 28 weeks to prevent early onset preeclampsia    - The patient  does not have a history of spontaneous  birth so  WILL NOT consider progesterone starting at 16-20 weeks and/or serial transvaginal cervical length ultrasounds from 16-24 weeks.     -The patient does not have a history of immunosuppresion or HIV so Toxoplasma IgG/IgM WILL NOT be ordered.     PERSONAL/SOCIAL HISTORY  - Claudia Yepez    History of anxiety or depression  denies- if Yes, therapy/medication/hospitalization- n/a  Additional items: Denies past or present domestic violence, sexual and psychological abuse.    Objective  -VS: reviewed and within normal limits   -General appearance: no acute distress, patient is comfortable   NEUROLOGICAL/PSYCHIATRIC   - Orientated x3,   -Mood and affect: : normal     Pap 2020- NIL    Assessment/Plan  Armida was seen today for prenatal care.    Diagnoses and all orders for this visit:    Supervision of high risk pregnancy in first trimester  -     ABO/Rh type and screen  -     CBC with platelets  -     Vitamin D Deficiency  -     Rubella Antibody IgG Quantitative  -     Treponema Abs w Reflex to RPR and Titer  -     HIV Antigen Antibody Combo  -     Hepatitis B surface antigen  -     Hepatitis B Surface Antibody  -     Hepatitis C antibody  -     Urine Culture Aerobic Bacterial  -     Hemoglobin A1c  -     Hgb with reflex to ELP or  RBC Solubility (Sickle Cell Screen)  -     Varicella Zoster Virus Antibody IgG  -     ondansetron (ZOFRAN-ODT) 4 MG ODT tab; Take 1 tablet (4 mg) by mouth every 8 hours as needed for nausea  -     docusate sodium (COLACE) 100 MG tablet; Take 1 tablet (100 mg) by mouth daily May take up to 2 times a day  -     acetaminophen (TYLENOL) 325 MG tablet; Take 1-2 tablets (325-650 mg) by mouth every 8 hours as needed for mild pain  -     Discontinue: aspirin (ASA) 81 MG EC tablet; Take 1 tablet (81 mg) by mouth daily  -     MAT FETAL MED CTR REFERRAL-PREGNANCY  -     aspirin (ASA) 81 MG EC tablet; Take 1 tablet (81 mg) by mouth daily Start after 12 weeks of pregnancy    Class 2 obesity with body mass index (BMI) of 38.0 to 38.9 in adult, unspecified obesity type, unspecified whether serious comorbidity present        26 year old  11w2d weeks of pregnancy with NICKY of Sep 3, 2021 by LMP of Patient's last menstrual period was 2020.. Ultrasound confirms.   Outpatient Encounter Medications as of 2021   Medication Sig Dispense Refill     acetaminophen (TYLENOL) 325 MG tablet Take 1-2 tablets (325-650 mg) by mouth every 8 hours as needed for mild pain 30 tablet 0     aspirin (ASA) 81 MG EC tablet Take 1 tablet (81 mg) by mouth daily Start after 12 weeks of pregnancy 90 tablet 1     docusate sodium (COLACE) 100 MG tablet Take 1 tablet (100 mg) by mouth daily May take up to 2 times a day 60 tablet 0     metFORMIN (GLUCOPHAGE) 850 MG tablet TAKE 1 TABLET BY MOUTH ONCE DAILY WITH BREAKFAST       ondansetron (ZOFRAN-ODT) 4 MG ODT tab Take 1 tablet (4 mg) by mouth every 8 hours as needed for nausea 30 tablet 1     [DISCONTINUED] aspirin (ASA) 81 MG EC tablet Take 1 tablet (81 mg) by mouth daily 90 tablet 1     [DISCONTINUED] letrozole (FEMARA) 2.5 MG tablet Take 2 tablets each day on days 3-7 of your menstrual cycle 10 tablet 0     [DISCONTINUED] letrozole (FEMARA) 2.5 MG tablet Take 1 tablet (2.5 mg) by mouth daily 5  tablet 3     [DISCONTINUED] medroxyPROGESTERone (PROVERA) 10 MG tablet Take 1 tablet (10 mg) by mouth daily 5 tablet 3     No facility-administered encounter medications on file as of 2/5/2021.       Orders Placed This Encounter   Procedures     CBC with platelets     Vitamin D Deficiency     Rubella Antibody IgG Quantitative     Treponema Abs w Reflex to RPR and Titer     HIV Antigen Antibody Combo     Hepatitis B surface antigen     Hepatitis B Surface Antibody     Hepatitis C antibody     Hemoglobin A1c     Hgb with reflex to ELP or RBC Solubility (Sickle Cell Screen)     Varicella Zoster Virus Antibody IgG     MAT FETAL MED CTR REFERRAL-PREGNANCY     ABO/Rh type and screen                 Orders Placed This Encounter   Procedures     CBC with platelets     Vitamin D Deficiency     Rubella Antibody IgG Quantitative     Treponema Abs w Reflex to RPR and Titer     HIV Antigen Antibody Combo     Hepatitis B surface antigen     Hepatitis B Surface Antibody     Hepatitis C antibody     Hemoglobin A1c     Hgb with reflex to ELP or RBC Solubility (Sickle Cell Screen)     Varicella Zoster Virus Antibody IgG     MAT FETAL MED CTR REFERRAL-PREGNANCY     ABO/Rh type and screen       - Oriented to Practice, types of care, and how to reach a provider.  Pt prefers Undecided between CNM and MD, will continue to consider.   - Patient received 1st trimester new OB education packet complete with aide of [facility] booklet including information on genetic screening test options.  - Patient desires level II ultrasound which was ordered.  - Educational handout on the prevention of infections diseases during pregnancy provided.  - Patient was encouraged to start prenatal vitamins as tolerated.      - Pregnancy concerns to be addressed by provider at new OB exam include: diabetes vs pre-diabetes, start aspirin    - OBI education reviewed.  - OBI labs ordered.  - Pap up to date.  - Hemoglobin A1c added to labs.   Further  review diabetes hx with MD at next appointment and determine recommendations for testing, metformin, pregnancy monitoring.  - Reviewed recommendation for low dose aspirin daily to prevent pre eclampsia. Prescription for aspirin sent. To start after 12 weeks.   - Tylenol and colace prescription sent.  - MFM referral placed for level 2 ultrasound.    Pt to RTO for NOB visit in 2 weeks with OB resident and prn if questions or concerns    Placiod Taylor CNM    Addendum:  After further review of CareEverywhere records, note of elevated BPs without diagnosis of hypertension.  Will need baseline pre-e labs and plan.    DEEPIKA Hartley CNM

## 2021-02-18 NOTE — PROGRESS NOTES
[facility]  Return OB Visit    S: Doing well today, no concerns. Denies N/V. Still taking metformin 500mg daily, was started on this for pre-diabetes. She admits having higher blood pressure prior to pregnancy only when in the hospital, checks BP at home and it is usually 120s/80s. Denies vaginal bleeding, vaginal discharge.    O: LMP 2020   Weight gain:     Gen: Well-appearing, NAD    20  A1c 6.4    20  A1c 5.2    21  A1c 5.6    A/P:  Armida Da Silva is a 26 year old  at 12w0d by LMP cw 5w2d US, here for return OB visit. Discussed her diagnoses of chronic hypertension despite lower Bps at home, and the risk for PIH in pregnancy. Discussed starting ASA, BP checks at home, and monitoring. She currently has diagnosis of pre-diabetes, with A1c 6.4% very close to diagnostic level. She also has PCOS and required ovulation induction to achieve pregnancy. She also has achieved improved glycemic control on metformin. Given these factors, despite not technically having a diagnosis of T2DM, we will plan to treat her as pre-gestational diabetes in this pregnancy. She has not been checking her blood sugars this pregnancy as she doesn't have a glucose monitor. Will plan to have her check BG and return in 1 week to assess control. We discussed diagnosis of diabetes in pregnancy, increasing insulin resistance, risk for fetal macrosomia, and recommendations for BG monitoring and fetal monitoring. She expressed understanding, and will follow up in 1 week to assess glucose control.     #History of pre-diabetes and PCOS, on metformin  #Pre-gestational diabetes  - On metformin 500mg daily, continue  - Early A1c 5.6  - Rx for glucose monitor, lancets, testing strips   - Diabetes education referral  - RTC in 1 week to review BG monitoring to titrate medications. If within goal, consider continuing metformin. If not within goal, consider transitioning to insulin treatment  - Recommend level II US, fetal  echocardiogram, growth US q4 weeks, BPP bi-weekly starting 32 weeks     #Chronic HTN  - Baseline HELLP labs ordered   - Start low dose ASA at 12 weeks - discussed today  - Plan for level II US, growth US q4-6 weeks following growth, BPP weekly at 32 weeks  - Recommend baseline EKG  - More frequent blood pressure monitoring; she should check her blood pressure weekly until 32 weeks at which point she should check it two times per week  - Has BP cuff at home  - Delivery at 38-39 weeks     #PNC: - Prenatal labs, Rh Pos, Mariia Neg, Hgb 13.7, HEP nl, Plt 419, Trep NR, UCx normal, Hep C neg, HIV neg   - GC not obtained, ordered today    - Vit D deficiency, recommend supplementation, she has this at home   - Immunizations - Hep B nonimmune, start next week as running late today   - Genetic testing review next week - briefly discussed today    Return to clinic in 1 weeks.     Staffed with Dr. Ozzie Saavedra MD PGY2  Obstetrics & Gynecology  02/19/21     Patient was seen by the resident in Continuity of Care Clinic.  I reviewed the history & exam.  The patient's assessment and plan were made jointly.    Misa Horne MD MPH

## 2021-02-19 ENCOUNTER — OFFICE VISIT (OUTPATIENT)
Dept: OBGYN | Facility: CLINIC | Age: 26
End: 2021-02-19
Attending: ADVANCED PRACTICE MIDWIFE
Payer: COMMERCIAL

## 2021-02-19 VITALS
HEART RATE: 96 BPM | HEIGHT: 66 IN | SYSTOLIC BLOOD PRESSURE: 135 MMHG | DIASTOLIC BLOOD PRESSURE: 81 MMHG | BODY MASS INDEX: 39.37 KG/M2 | WEIGHT: 245 LBS

## 2021-02-19 DIAGNOSIS — O10.919 PRE-EXISTING HYPERTENSION AFFECTING PREGNANCY, ANTEPARTUM: ICD-10-CM

## 2021-02-19 DIAGNOSIS — R73.03 PRE-DIABETES: Primary | ICD-10-CM

## 2021-02-19 DIAGNOSIS — O09.90 SUPERVISION OF HIGH RISK PREGNANCY, ANTEPARTUM: ICD-10-CM

## 2021-02-19 LAB
ALT SERPL W P-5'-P-CCNC: 22 U/L (ref 0–50)
ANION GAP SERPL CALCULATED.3IONS-SCNC: 11 MMOL/L (ref 3–14)
AST SERPL W P-5'-P-CCNC: 20 U/L (ref 0–45)
BUN SERPL-MCNC: 5 MG/DL (ref 7–30)
CALCIUM SERPL-MCNC: 9 MG/DL (ref 8.5–10.1)
CHLORIDE SERPL-SCNC: 107 MMOL/L (ref 94–109)
CO2 SERPL-SCNC: 20 MMOL/L (ref 20–32)
CREAT SERPL-MCNC: 0.32 MG/DL (ref 0.52–1.04)
CREAT UR-MCNC: 92 MG/DL
ERYTHROCYTE [DISTWIDTH] IN BLOOD BY AUTOMATED COUNT: 13.2 % (ref 10–15)
GFR SERPL CREATININE-BSD FRML MDRD: >90 ML/MIN/{1.73_M2}
GLUCOSE SERPL-MCNC: 96 MG/DL (ref 70–99)
HCT VFR BLD AUTO: 37.4 % (ref 35–47)
HGB BLD-MCNC: 12.7 G/DL (ref 11.7–15.7)
MCH RBC QN AUTO: 28.2 PG (ref 26.5–33)
MCHC RBC AUTO-ENTMCNC: 34 G/DL (ref 31.5–36.5)
MCV RBC AUTO: 83 FL (ref 78–100)
PLATELET # BLD AUTO: 340 10E9/L (ref 150–450)
POTASSIUM SERPL-SCNC: 3.5 MMOL/L (ref 3.4–5.3)
PROT UR-MCNC: 0.08 G/L
PROT/CREAT 24H UR: 0.09 G/G CR (ref 0–0.2)
RBC # BLD AUTO: 4.51 10E12/L (ref 3.8–5.2)
SODIUM SERPL-SCNC: 138 MMOL/L (ref 133–144)
WBC # BLD AUTO: 9.9 10E9/L (ref 4–11)

## 2021-02-19 PROCEDURE — 80048 BASIC METABOLIC PNL TOTAL CA: CPT | Performed by: OBSTETRICS & GYNECOLOGY

## 2021-02-19 PROCEDURE — 85027 COMPLETE CBC AUTOMATED: CPT | Performed by: OBSTETRICS & GYNECOLOGY

## 2021-02-19 PROCEDURE — 84450 TRANSFERASE (AST) (SGOT): CPT | Performed by: OBSTETRICS & GYNECOLOGY

## 2021-02-19 PROCEDURE — 87591 N.GONORRHOEAE DNA AMP PROB: CPT | Performed by: OBSTETRICS & GYNECOLOGY

## 2021-02-19 PROCEDURE — 87491 CHLMYD TRACH DNA AMP PROBE: CPT | Performed by: OBSTETRICS & GYNECOLOGY

## 2021-02-19 PROCEDURE — 99207 PR PRENATAL VISIT: CPT | Mod: GE | Performed by: OBSTETRICS & GYNECOLOGY

## 2021-02-19 PROCEDURE — G0463 HOSPITAL OUTPT CLINIC VISIT: HCPCS | Mod: 25

## 2021-02-19 PROCEDURE — 84156 ASSAY OF PROTEIN URINE: CPT | Performed by: OBSTETRICS & GYNECOLOGY

## 2021-02-19 PROCEDURE — 84460 ALANINE AMINO (ALT) (SGPT): CPT | Performed by: OBSTETRICS & GYNECOLOGY

## 2021-02-19 PROCEDURE — 36415 COLL VENOUS BLD VENIPUNCTURE: CPT | Performed by: OBSTETRICS & GYNECOLOGY

## 2021-02-19 RX ORDER — PNV NO.95/FERROUS FUM/FOLIC AC 28MG-0.8MG
1 TABLET ORAL DAILY
Qty: 90 TABLET | Refills: 3 | Status: SHIPPED | OUTPATIENT
Start: 2021-02-19

## 2021-02-19 RX ORDER — SWAB
1 SWAB, NON-MEDICATED MISCELLANEOUS DAILY
Qty: 60 CAPSULE | Refills: 3 | Status: CANCELLED | OUTPATIENT
Start: 2021-02-19

## 2021-02-19 RX ORDER — GLUCOSAMINE HCL/CHONDROITIN SU 500-400 MG
CAPSULE ORAL
Qty: 100 EACH | Refills: 3 | Status: SHIPPED | OUTPATIENT
Start: 2021-02-19 | End: 2022-01-27

## 2021-02-19 ASSESSMENT — MIFFLIN-ST. JEOR: SCORE: 1878.06

## 2021-02-19 ASSESSMENT — PAIN SCALES - GENERAL: PAINLEVEL: NO PAIN (0)

## 2021-02-19 NOTE — LETTER
2021       RE: Armida Da Silva  [address]  [place] [address]     Dear Colleague,    Thank you for referring your patient, Armida Da Silva, to the [facility] [place] at [facility]. Please see a copy of my visit note below.    [facility]  Return OB Visit    S: Doing well today, no concerns. Denies N/V. Still taking metformin 500mg daily, was started on this for pre-diabetes. She admits having higher blood pressure prior to pregnancy only when in the hospital, checks BP at home and it is usually 120s/80s. Denies vaginal bleeding, vaginal discharge.    O: LMP 2020   Weight gain:     Gen: Well-appearing, NAD    20  A1c 6.4    20  A1c 5.2    21  A1c 5.6    A/P:  Armida Da Silva is a 26 year old  at 12w0d by LMP cw 5w2d US, here for return OB visit. Discussed her diagnoses of chronic hypertension despite lower Bps at home, and the risk for PIH in pregnancy. Discussed starting ASA, BP checks at home, and monitoring. She currently has diagnosis of pre-diabetes, with A1c 6.4% very close to diagnostic level. She also has PCOS and required ovulation induction to achieve pregnancy. She also has achieved improved glycemic control on metformin. Given these factors, despite not technically having a diagnosis of T2DM, we will plan to treat her as pre-gestational diabetes in this pregnancy. She has not been checking her blood sugars this pregnancy as she doesn't have a glucose monitor. Will plan to have her check BG and return in 1 week to assess control. We discussed diagnosis of diabetes in pregnancy, increasing insulin resistance, risk for fetal macrosomia, and recommendations for BG monitoring and fetal monitoring. She expressed understanding, and will follow up in 1 week to assess glucose control.     #History of pre-diabetes and PCOS, on metformin  #Pre-gestational diabetes  - On metformin 500mg daily, continue  - Early  A1c 5.6  - Rx for glucose monitor, lancets, testing strips   - Diabetes education referral  - RTC in 1 week to review BG monitoring to titrate medications. If within goal, consider continuing metformin. If not within goal, consider transitioning to insulin treatment  - Recommend level II US, fetal echocardiogram, growth US q4 weeks, BPP bi-weekly starting 32 weeks     #Chronic HTN  - Baseline HELLP labs ordered   - Start low dose ASA at 12 weeks - discussed today  - Plan for level II US, growth US q4-6 weeks following growth, BPP weekly at 32 weeks  - Recommend baseline EKG  - More frequent blood pressure monitoring; she should check her blood pressure weekly until 32 weeks at which point she should check it two times per week  - Has BP cuff at home  - Delivery at 38-39 weeks     #PNC: - Prenatal labs, Rh Pos, Mariia Neg, Hgb 13.7, HEP nl, Plt 419, Trep NR, UCx normal, Hep C neg, HIV neg   - GC not obtained, ordered today    - Vit D deficiency, recommend supplementation, she has this at home   - Immunizations - Hep B nonimmune, start next week as running late today   - Genetic testing review next week - briefly discussed today    Return to clinic in 1 weeks.     Staffed with Dr. Ozzie Saavedra MD PGY2  Obstetrics & Gynecology  02/19/21     Patient was seen by the resident in Continuity of Care Clinic.  I reviewed the history & exam.  The patient's assessment and plan were made jointly.    Misa Horne MD MPH

## 2021-02-20 LAB
C TRACH DNA SPEC QL NAA+PROBE: NEGATIVE
N GONORRHOEA DNA SPEC QL NAA+PROBE: NEGATIVE
SPECIMEN SOURCE: NORMAL
SPECIMEN SOURCE: NORMAL

## 2021-02-23 DIAGNOSIS — R73.03 PREDIABETES: Primary | ICD-10-CM

## 2021-02-23 RX ORDER — BLOOD-GLUCOSE METER
EACH MISCELLANEOUS
Qty: 1 KIT | Refills: 0 | Status: SHIPPED | OUTPATIENT
Start: 2021-02-23 | End: 2022-01-27

## 2021-02-24 ENCOUNTER — PATIENT OUTREACH (OUTPATIENT)
Dept: EDUCATION SERVICES | Facility: CLINIC | Age: 26
End: 2021-02-24
Attending: STUDENT IN AN ORGANIZED HEALTH CARE EDUCATION/TRAINING PROGRAM
Payer: COMMERCIAL

## 2021-02-24 DIAGNOSIS — O24.419 GESTATIONAL DIABETES: ICD-10-CM

## 2021-02-24 DIAGNOSIS — R73.03 PRE-DIABETES: ICD-10-CM

## 2021-02-24 DIAGNOSIS — O09.91 SUPERVISION OF HIGH RISK PREGNANCY IN FIRST TRIMESTER: Primary | ICD-10-CM

## 2021-02-24 PROCEDURE — G0108 DIAB MANAGE TRN  PER INDIV: HCPCS | Mod: 95

## 2021-02-24 NOTE — PROGRESS NOTES
Diabetes Self-Management Education & Support    SUBJECTIVE/OBJECTIVE:  Presents for education related to gestational diabetes.  Called with two interpreters.  First  was dropped.  Second  was hard to hear.     Accompanied by: Self  Gestational weeks: 13 weeks  Number of previous preganancies: 0  Had any babies over 9 lbs: No  Previously had Gestational Diabetes: No    Cultural Influences/Ethnic Background:  Choose not to answer      Estimated Date of Delivery: Sep 3, 2021  (currently 13 weeks)     1 hour OGTT/3 hour OGTT  History of pre diabetes with A1c of 6.4, treat as pre-gestational diabetes in this pregnancy    Lab Results   Component Value Date    A1C 5.6 02/05/2021       Lifestyle and Health Behaviors:  Exercise:: (unable to assess - not enough time)  Meals include: Breakfast, Lunch, Dinner    Healthy Coping:  Emotional response to diabetes: Ready to learn  Stage of change: ACTION (Actively working towards change)    Date Breakfast Breakfast Lunch Dinner    Before After After After   2/20 95 115 117 120   2/21 92 118 100 120   2/22 89 126 99 135   2/23 92 96 120 104   2/24 91 126                          ASSESSMENT/INTERVENTION:  Blood sugars 100% in target.  (Per OB continue metformin if in target, may need to transition to insulin at some time).  Reviewed clean hands and temperature control of strips, otherwise is not having any issues with checking blood sugars.  Reviewed blood sugars potentially increasing as the weeks go on and to continue checking.  Reviewed that insulin is likely going to be needed, but not quite yet.  Discussed importance of maintaining blood sugars in very tight control for health of baby.  Started reviewing carbohydrates and foods, unable to finish given difficulties with the interpreters.  Added on to schedule 2/25 for another 60 minutes to specifically review carbohydrates.       Educational topics covered today:  GDM diagnosis, pathophysiology, Risks and  Complications of GDM, Means of controlling GDM, Using a Blood Glucose Monitor, Blood Glucose Goals, Logging and Interpreting Glucose Results,  When to Call a Diabetes Educator or OB Provider, Healthy Eating During Pregnancy, Counting Carbohydrates, Meal Planning for GDM, and Physical Activity    Educational materials provided today:   Anne Understanding Gestational Diabetes  GDM Log Book  Care After Delivery      Pt verbalized understanding of concepts discussed and recommendations provided today.     PLAN:  Check glucose 4 times daily, before breakfast and 1 hour after each meal.   Physical activity recommended: as tolerated  Meal plan: 2-3 carbs at breakfast, 3-4carbs at lunch, 2-4 carbs at supper, 1-2 carbs at snacks   Follow consistent CHO meal plan, eat CHO and protein/fat at all meals/snacks.    Did not review ketones yet     Call/e-mail/MyChart message diabetes educator if 3 or more blood sugars are above the goal in 1 week, if ketones are positive, or with questions/concerns.    Eulalia García RD, LD, CDE  Diabetes Education    Time Spent: 60 minutes  Encounter Type: Individual      A copy of this encounter was shared with the provider.

## 2021-02-25 ENCOUNTER — VIRTUAL VISIT (OUTPATIENT)
Dept: EDUCATION SERVICES | Facility: CLINIC | Age: 26
End: 2021-02-25
Payer: COMMERCIAL

## 2021-02-25 DIAGNOSIS — O24.419 GESTATIONAL DIABETES: ICD-10-CM

## 2021-02-25 DIAGNOSIS — R73.03 PRE-DIABETES: Primary | ICD-10-CM

## 2021-02-25 DIAGNOSIS — O09.91 SUPERVISION OF HIGH RISK PREGNANCY IN FIRST TRIMESTER: ICD-10-CM

## 2021-02-25 PROCEDURE — 99207 PR DROP WITH A PROCEDURE: CPT

## 2021-02-25 PROCEDURE — G0108 DIAB MANAGE TRN  PER INDIV: HCPCS | Mod: 95

## 2021-02-25 NOTE — PROGRESS NOTES
Gestational Diabetes Follow-up    Subjective/Objective:  Set follow up today after yesterday's appointment  specifically to go through carbohydrates and diet further.  Used  15815.     Food log:   Breakfast: 8:30am somaoli pancakes / anjero ( a little bigger than the hand and eats 3) with soup (tomatoes, okra or goat meat with potatoes, tomatoes)  OR bread and eggs.  Tea with a little sugar.   Snack: 10:30am grapes or oranges.    Lunch: 1:30pm brown pasta OR rice.  Sometimes adds salads / vegetables chicken.   Occasionally eggs.  Sometimes orange juice - adds water to it (diluted).    Snack: no snack   Dinner:6:30  beans and wheat (ambulo) or bread with wheat. No meat at night .  Water   Snack: sometimes drinks milk before bed.     Keep grapes at 1 cup or less for snacks   Keep pasta / rice at 1 cup or a a little over.  Add salad / chicken to help complete the meal   Discussed swapping the orange juice for water if blood sugars go above 140     Did not talk about ketones yet     Activity - does a lot of house chores after eating.  Discussed how movement can lower blood sugars     Helped set up weekly telephone checkins Mondays at 2pm on the [place] GDM schedule - recommend to continue to help schedule these in advance as patient will need weekly contact especially if insulin is started at some point.       Eulalia García RD, LD, CDE  Diabetes Education  Time spent: 41 minutes

## 2021-02-25 NOTE — PROGRESS NOTES
[facility]  Return OB Visit    S: Doing well today, no concerns. Denies N/V. Checking BGs at home, saw Diabetes Ed, BGs within goal. BP monitor broke, and she has not obtained a new one yet. Has mild nausea, no vomiting. Denies vaginal bleeding, small amount of vaginal discharge.     O: /75   Pulse 98   Wt 110.7 kg (244 lb)   LMP 2020   Breastfeeding No   BMI 39.38 kg/m    Weight gain:   2.041 kg (4 lb 8 oz)      Gen: Well-appearing, NAD  FHR: deferred due to habitus, early GA      A/P:  Armida Da Silva is a 26 year old  at 13w0d by LMP cw 5w2d US, here for return OB visit. Pregnancy is complicated by pre-existing diabetes (see note 2021 for diagnoses details,) chronic hypertension, and hep B NI.    #History of pre-diabetes and PCOS, on metformin  #Pre-gestational diabetes  - On metformin 500mg daily, continue, as BGs within goal  - Early A1c 5.6  - continue to follow with Diabetes Education  - consider transition to insulin if BGs leave target range  - Recommend level II US, fetal echocardiogram, growth US q4 weeks, BPP bi-weekly starting 32 weeks   - Ophthalmology referral ordered today    #Chronic HTN  - Baseline HELLP labs wnl  - on ASA for preE ppx  - Plan for level II US, growth US q4-6 weeks following growth, BPP weekly at 32 weeks  - Recommend baseline EKG, done today, will refer to PCP for right axis deviation  - More frequent blood pressure monitoring; she should check her blood pressure weekly until 32 weeks at which point she should check it two times per week. Her BP cuff is not working, DME rx printed, patient instructed to obtain an additional one.   - Delivery at 38-39 weeks     #PNC: - Prenatal labs, Rh Pos, Mariia Neg, Hgb 13.7, HEP nl, Plt 419, Trep NR, UCx normal, Hep C neg, HIV neg, GC neg   - Vit D deficiency, recommend supplementation, she has this at home   - Immunizations - s/p influence 2020, Hep B nonimmune, first dose given today   - Genetic testing - declined GC  referral, offer AFP at next visit     Return to clinic in 2-3 weeks.     Staffed with Dr. Sil Manzo MD,  Ob/Gyn PGY-4  02/27/21 10:31 AM    The Patient was seen in Resident Continuity Clinic by COLETTE MANZO.  I reviewed the history & exam. Assessment and plan were jointly made.    Lesa Mujica MD

## 2021-02-26 ENCOUNTER — OFFICE VISIT (OUTPATIENT)
Dept: OBGYN | Facility: CLINIC | Age: 26
End: 2021-02-26
Payer: COMMERCIAL

## 2021-02-26 VITALS
WEIGHT: 244 LBS | HEART RATE: 98 BPM | SYSTOLIC BLOOD PRESSURE: 130 MMHG | BODY MASS INDEX: 39.38 KG/M2 | DIASTOLIC BLOOD PRESSURE: 75 MMHG

## 2021-02-26 DIAGNOSIS — O10.919 CHRONIC HYPERTENSION AFFECTING PREGNANCY: ICD-10-CM

## 2021-02-26 DIAGNOSIS — O09.91 SUPERVISION OF HIGH RISK PREGNANCY IN FIRST TRIMESTER: Primary | ICD-10-CM

## 2021-02-26 DIAGNOSIS — R73.03 PREDIABETES: ICD-10-CM

## 2021-02-26 PROCEDURE — G0010 ADMIN HEPATITIS B VACCINE: HCPCS

## 2021-02-26 PROCEDURE — 93010 ELECTROCARDIOGRAM REPORT: CPT | Performed by: INTERNAL MEDICINE

## 2021-02-26 PROCEDURE — 90746 HEPB VACCINE 3 DOSE ADULT IM: CPT

## 2021-02-26 PROCEDURE — 99207 PR PRENATAL VISIT: CPT | Mod: GE | Performed by: OBSTETRICS & GYNECOLOGY

## 2021-02-26 PROCEDURE — G0463 HOSPITAL OUTPT CLINIC VISIT: HCPCS

## 2021-02-26 PROCEDURE — 93005 ELECTROCARDIOGRAM TRACING: CPT | Performed by: STUDENT IN AN ORGANIZED HEALTH CARE EDUCATION/TRAINING PROGRAM

## 2021-02-26 PROCEDURE — 250N000011 HC RX IP 250 OP 636

## 2021-02-26 ASSESSMENT — PAIN SCALES - GENERAL: PAINLEVEL: NO PAIN (0)

## 2021-03-01 LAB — INTERPRETATION ECG - MUSE: NORMAL

## 2021-03-02 ENCOUNTER — ANCILLARY PROCEDURE (OUTPATIENT)
Dept: ULTRASOUND IMAGING | Facility: CLINIC | Age: 26
End: 2021-03-02
Attending: OBSTETRICS & GYNECOLOGY
Payer: COMMERCIAL

## 2021-03-02 DIAGNOSIS — O36.71X0 DELIVERY OF VIABLE FETUS DURING ABDOMINAL PREGNANCY IN FIRST TRIMESTER, SINGLE OR UNSPECIFIED FETUS: ICD-10-CM

## 2021-03-02 PROCEDURE — 76801 OB US < 14 WKS SINGLE FETUS: CPT

## 2021-03-02 PROCEDURE — 76801 OB US < 14 WKS SINGLE FETUS: CPT | Mod: 26 | Performed by: OBSTETRICS & GYNECOLOGY

## 2021-03-05 ENCOUNTER — TELEPHONE (OUTPATIENT)
Dept: OBGYN | Facility: CLINIC | Age: 26
End: 2021-03-05

## 2021-03-05 ENCOUNTER — VIRTUAL VISIT (OUTPATIENT)
Dept: EDUCATION SERVICES | Facility: CLINIC | Age: 26
End: 2021-03-05
Payer: COMMERCIAL

## 2021-03-05 DIAGNOSIS — K59.00 CONSTIPATION: Primary | ICD-10-CM

## 2021-03-05 DIAGNOSIS — O24.119 PREGNANCY COMPLICATED BY PRE-EXISTING TYPE 2 DIABETES: Primary | ICD-10-CM

## 2021-03-05 PROCEDURE — 98968 PH1 ASSMT&MGMT NQHP 21-30: CPT | Performed by: DIETITIAN, REGISTERED

## 2021-03-05 RX ORDER — POLYETHYLENE GLYCOL 3350 17 G/17G
17 POWDER, FOR SOLUTION ORAL DAILY PRN
Qty: 510 G | Refills: 1 | Status: SHIPPED | OUTPATIENT
Start: 2021-03-05 | End: 2021-08-09

## 2021-03-05 NOTE — TELEPHONE ENCOUNTER
Message received from patient's  regarding pt having constipation.    Called and spoke with pt's , he reports she is having constipation and has not had BM in 3 days. States she is currently taking Colace daily. Denies vaginal bleeding/cramping.    Recommended adding Miralax prn for constipation. Instructed on how to take including increasing PO hydration, fiber in diet, and activity, and continuation of Colace. He verbalized understanding and agreement. Instructed him to call back if she has not had BM in 2-3 days after starting Miralax or with any other questions or concerns. He verbalized understanding and agreement.     Miralax rx sent to preferred pharmacy per protocol.

## 2021-03-05 NOTE — PROGRESS NOTES
Pre- Diabetes in pregnancy Follow-up    Subjective/Objective:    Armida Da Silva shares blood glucose log for review. Last date of communication was: 2/25   [place]  [id number]  Gestational diabetes is being managed with medications.    Taking diabetes medications:   yes:     Diabetes Medication(s)     Biguanides       metFORMIN (GLUCOPHAGE) 850 MG tablet    TAKE 1 TABLET BY MOUTH ONCE DAILY WITH BREAKFAST          Estimated Date of Delivery: Sep 3, 2021    BG/Food Log:   Date  Ketones FBG 1 hours post   Breakfast 1 hour post lunch    1 hours post dinner   2/27  87 126 99 115   28  84 124 106 111   1  80 122 109 120   2  86 140 129 120   3   84 120 100 130   4  83 140 101 113   5  83 135         Assessment:  Ketones:   Fasting blood glucoses: 100% in target.  After breakfast: 100% in target.  After lunch: 100% in target.  After dinner: 100% in target.    Says she is doing well with healthy eating- talked to Urszula last time, does not have any questions about food.     ~ Asks about the pills for constipation. Says she has taken twice each day, drinks lots of water, still having constipation.   Advised call the OB who ordered the pills and let them know.     Plan/Response:  No changes in the patient's current treatment plan.  Advised call OB about constipation meds- provided [facility]  line    Reminded of next phone appt on 3/15    Natalie Balbuena RD, TANA, [facility]  Time spent: 22 minutes    Any diabetes medication dose changes were made via the CDE Protocol and Collaborative Practice Agreement with the patient's OB/GYN provider.

## 2021-03-08 DIAGNOSIS — R73.03 PREDIABETES: ICD-10-CM

## 2021-03-09 ENCOUNTER — APPOINTMENT (OUTPATIENT)
Dept: INTERPRETER SERVICES | Facility: CLINIC | Age: 26
End: 2021-03-09
Payer: COMMERCIAL

## 2021-03-09 ENCOUNTER — OFFICE VISIT (OUTPATIENT)
Dept: OPHTHALMOLOGY | Facility: CLINIC | Age: 26
End: 2021-03-09
Attending: STUDENT IN AN ORGANIZED HEALTH CARE EDUCATION/TRAINING PROGRAM
Payer: COMMERCIAL

## 2021-03-09 DIAGNOSIS — O24.415 GESTATIONAL DIABETES MELLITUS (GDM) IN SECOND TRIMESTER CONTROLLED ON ORAL HYPOGLYCEMIC DRUG: Primary | ICD-10-CM

## 2021-03-09 DIAGNOSIS — H04.123 DRY EYES, BILATERAL: ICD-10-CM

## 2021-03-09 DIAGNOSIS — E11.9 DIABETES MELLITUS TYPE 2 WITHOUT RETINOPATHY (H): ICD-10-CM

## 2021-03-09 PROCEDURE — G0463 HOSPITAL OUTPT CLINIC VISIT: HCPCS

## 2021-03-09 PROCEDURE — 92004 COMPRE OPH EXAM NEW PT 1/>: CPT | Performed by: OPHTHALMOLOGY

## 2021-03-09 ASSESSMENT — CUP TO DISC RATIO
OD_RATIO: 0.3
OS_RATIO: 0.4

## 2021-03-09 ASSESSMENT — SLIT LAMP EXAM - LIDS
COMMENTS: MGD
COMMENTS: MGD

## 2021-03-09 ASSESSMENT — CONF VISUAL FIELD
OD_NORMAL: 1
OS_NORMAL: 1

## 2021-03-09 ASSESSMENT — VISUAL ACUITY
METHOD: SNELLEN - LINEAR
METHOD_MR: DEFERS
OS_SC: 20/15
OD_SC: J1+
OD_SC: 20/20
OS_SC: J1+

## 2021-03-09 ASSESSMENT — EXTERNAL EXAM - LEFT EYE: OS_EXAM: NORMAL

## 2021-03-09 ASSESSMENT — TONOMETRY
IOP_METHOD: TONOPEN
OS_IOP_MMHG: 11
OD_IOP_MMHG: 11

## 2021-03-09 ASSESSMENT — EXTERNAL EXAM - RIGHT EYE: OD_EXAM: NORMAL

## 2021-03-09 NOTE — PROGRESS NOTES
HPI:  Armida Da Silva is a 26 year old female referred for diabetic eye exam in pregnancy. She feels her vision has been good and stable in both eyes without problems. No eye pain, redness, discharge. No flashes/floaters.    She states she was diagnosed with diabetes 3 months prior to pregnancy and is on metformin. She is about 15 weeks.     Assessment & Plan     (O24.415) Gestational diabetes mellitus (GDM) in second trimester controlled on oral hypoglycemic drug  (primary encounter diagnosis)  (E11.9) Diabetes mellitus type 2 without retinopathy (H)  Comment: Diagnosed a few months ago. On metformin. Last A1c 5.6. No diabetic retinopathy.  Plan: Recommend diabetic eye exam again in 3rd trimester. Discussed the importance of tight blood glucose control in the prevention of diabetic retinopathy. Recommend yearly dilated eye exam after that.     (H04.123) Dry eyes, bilateral  Comment: Minimal sx  Plan: ATs PRN.     -----------------------------------------------------------------------------------    Patient disposition:   Return in about 3 months (around 6/9/2021) for diabetic eye exam, or sooner as needed.    Teaching statement:  Complete documentation of historical and exam elements from today's encounter can be found in the full encounter summary report (not reduplicated in this progress note). I personally obtained the chief complaint(s) and history of present illness.  I confirmed and edited as necessary the review of systems, past medical/surgical history, family history, social history, and examination findings as documented by others; and I examined the patient myself. I personally reviewed the relevant tests, images, and reports as documented above.     I formulated and edited as necessary the assessment and plan and discussed the findings and management plan with the patient and family.      Abi Rowan MD  Comprehensive Ophthalmology & Ocular Pathology  Department of Ophthalmology and Visual  Lety [email or web address]  Pager [phone]

## 2021-03-09 NOTE — NURSING NOTE
Chief Complaints and History of Present Illnesses   Patient presents with     Diabetic Eye Exam     Chief Complaint(s) and History of Present Illness(es)     Diabetic Eye Exam               Comments     Pt. States that she was referred for diabetic eye exam. No change in VA BE. No pain BE. No flashes or floaters BE.  Norma Olmos COT 8:06 AM March 9, 2021

## 2021-03-15 ENCOUNTER — VIRTUAL VISIT (OUTPATIENT)
Dept: EDUCATION SERVICES | Facility: CLINIC | Age: 26
End: 2021-03-15
Payer: COMMERCIAL

## 2021-03-15 DIAGNOSIS — O24.419 GESTATIONAL DIABETES: Primary | ICD-10-CM

## 2021-03-15 PROCEDURE — 98967 PH1 ASSMT&MGMT NQHP 11-20: CPT | Mod: 95

## 2021-03-15 NOTE — PROGRESS NOTES
Diabetes and Pregnancy Follow-up    Subjective/Objective:    Armida Da Silva was called for a scheduled BG review. Last date of communication was: 3/5/2021.    Gestational diabetes is being managed with diet and activity    Taking diabetes medications: no    Estimated Date of Delivery: Sep 3, 2021    Blood Glucose/Ketone Log:    Date Ketones Fasting Post Breakfast Post Lunch Post Supper   3/6  92 109 104 92   3/7  86 112 115 120   3/8  75 112 90 133   3/9  97 92 113 129   3/10  82 101 100 113   3/11  91 94 106 110   3/12  83 135 140 113   3/13  80 130 84 140   3/14  79 113 90 113   3/15  75 103       Scheduled through 4/12    Assessment:    Ketones: not assessed.   Fasting blood glucoses: 90% in target.  After breakfast: 100% in target.  After lunch: 100% in target.  After dinner: 100% in target.    Plan/Response:  No changes in the patient's current treatment plan.    Irma Green RD, LD, CDE  Time Spent: 12 minutes    Any diabetes medication dose changes were made via the CDE Protocol and Collaborative Practice Agreement with the patient's OB/GYN provider. A copy of this encounter was shared with the provider.

## 2021-03-22 ENCOUNTER — VIRTUAL VISIT (OUTPATIENT)
Dept: EDUCATION SERVICES | Facility: CLINIC | Age: 26
End: 2021-03-22
Payer: COMMERCIAL

## 2021-03-22 ENCOUNTER — OFFICE VISIT (OUTPATIENT)
Dept: INTERNAL MEDICINE | Facility: CLINIC | Age: 26
End: 2021-03-22
Attending: INTERNAL MEDICINE
Payer: COMMERCIAL

## 2021-03-22 VITALS
HEIGHT: 66 IN | SYSTOLIC BLOOD PRESSURE: 131 MMHG | HEART RATE: 86 BPM | BODY MASS INDEX: 39.37 KG/M2 | WEIGHT: 245 LBS | DIASTOLIC BLOOD PRESSURE: 85 MMHG

## 2021-03-22 DIAGNOSIS — O24.419 DM (DIABETES MELLITUS), GESTATIONAL, ANTEPARTUM: Primary | ICD-10-CM

## 2021-03-22 DIAGNOSIS — R94.31 RIGHT AXIS DEVIATION: Primary | ICD-10-CM

## 2021-03-22 PROCEDURE — 99203 OFFICE O/P NEW LOW 30 MIN: CPT | Performed by: INTERNAL MEDICINE

## 2021-03-22 ASSESSMENT — MIFFLIN-ST. JEOR: SCORE: 1878.06

## 2021-03-22 NOTE — LETTER
3/22/2021         RE: Armida Da Silva  [address]  [place] [address]        Dear Colleague,    Thank you for referring your patient, Armida Da Silva, to the [facility]. Please see a copy of my visit note below.    Gestational Diabetes Follow-up    Subjective/Objective:    Armida Da Silva sent in blood glucose log for review. Last date of communication was: 3/15.    Gestational diabetes is being managed with diet and activity    Taking diabetes medications:   yes:     Diabetes Medication(s)     Biguanides       metFORMIN (GLUCOPHAGE) 850 MG tablet    TAKE 1 TABLET BY MOUTH ONCE DAILY WITH BREAKFAST          Estimated Date of Delivery: Sep 3, 2021    BG/Food Log:   Blood Glucose/Ketone Log:    Date Ketones Fasting Post Breakfast Post Lunch Post Supper   3/15  75 103 109 115   3/16  76 140 100 126   3/17  82 127 125 120   3/18  94 136 128 127   3/19  89 132 104 109   3/20  94 140 132 105   3/21  86 132 140 120   3/22  78 126           Assessment:    Ketones: na.   Fasting blood glucoses: 100% in target.  After breakfast: 100% in target.  Before lunch: -% in target.  After lunch: 100% in target.  Before dinner: -% in target.  After dinner: 100% in target.    Patient states she has no questions or concerns at this time.     Plan/Response:  Follow-up in 1 week.    Tram Ferrell RN, CDCES      Any diabetes medication dose changes were made via the CDE Protocol and Collaborative Practice Agreement with the patient's referring provider. A copy of this encounter was shared with the provider.

## 2021-03-22 NOTE — PROGRESS NOTES
Gestational Diabetes Follow-up    Subjective/Objective:    Armida Da Silva sent in blood glucose log for review. Last date of communication was: 3/15.    Gestational diabetes is being managed with diet and activity    Taking diabetes medications:   yes:     Diabetes Medication(s)     Biguanides       metFORMIN (GLUCOPHAGE) 850 MG tablet    TAKE 1 TABLET BY MOUTH ONCE DAILY WITH BREAKFAST          Estimated Date of Delivery: Sep 3, 2021    BG/Food Log:   Blood Glucose/Ketone Log:    Date Ketones Fasting Post Breakfast Post Lunch Post Supper   3/15  75 103 109 115   3/16  76 140 100 126   3/17  82 127 125 120   3/18  94 136 128 127   3/19  89 132 104 109   3/20  94 140 132 105   3/21  86 132 140 120   3/22  78 126           Assessment:    Ketones: na.   Fasting blood glucoses: 100% in target.  After breakfast: 100% in target.  Before lunch: -% in target.  After lunch: 100% in target.  Before dinner: -% in target.  After dinner: 100% in target.    Patient states she has no questions or concerns at this time.     Plan/Response:  Follow-up in 1 week.    Tram Ferrell RN, [facility]      Any diabetes medication dose changes were made via the CDE Protocol and Collaborative Practice Agreement with the patient's referring provider. A copy of this encounter was shared with the provider.

## 2021-03-22 NOTE — PROGRESS NOTES
"    Assessment & Plan     Right axis deviation  Patient was advised on possible causes of right axis deviation. She has no evidence of chronic lung disease. Recommend further evaluation with echocardiogram. Patient will be advised on test results accordingly.   - Echocardiogram Complete; Future      30 minutes spent on the date of the encounter doing chart review, history and exam, documentation and further activities as noted above       BMI:   Estimated body mass index is 39.54 kg/m  as calculated from the following:    Height as of this encounter: 1.676 m (5' 6\").    Weight as of this encounter: 111.1 kg (245 lb).           No follow-ups on file.    Nisreen Mayorga MD  [facility] PASTORA Huffman is a 26 year old who presents for the following health issues     HPI     Patient is here for evaluation of abnormal EKG. Her EKG demonstrated right axis deviation. She denies issued with shortness of breath, cough, palpitations. She denies having heart issues as a child.       Review of Systems   Constitutional, HEENT, cardiovascular, pulmonary, GI, , musculoskeletal, neuro, skin, endocrine and psych systems are negative, except as otherwise noted.      Objective    /85   Pulse 86   Ht 1.676 m (5' 6\")   Wt 111.1 kg (245 lb)   LMP 11/27/2020   BMI 39.54 kg/m    Body mass index is 39.54 kg/m .  Physical Exam   GENERAL: healthy, alert and no distress  EYES: Eyes grossly normal to inspection, PERRL and conjunctivae and sclerae normal  HENT: normal cephalic/atraumatic  RESP: lungs clear to auscultation - no rales, rhonchi or wheezes  CV: regular rate and rhythm, normal S1 S2, no S3 or S4, no murmur, click or rub, no peripheral edema and peripheral pulses strong  ABDOMEN: soft, nontender, no hepatosplenomegaly, no masses and bowel sounds normal  MS: no gross musculoskeletal defects noted, no edema                "

## 2021-03-22 NOTE — LETTER
"3/22/2021       RE: Armida Da Silva  [address]  [place] [address]     Dear Colleague,    Thank you for referring your patient, Armida Da Silva, to the [facility]. Please see a copy of my visit note below.        Assessment & Plan     Right axis deviation  Patient was advised on possible causes of right axis deviation. She has no evidence of chronic lung disease. Recommend further evaluation with echocardiogram. Patient will be advised on test results accordingly.   - Echocardiogram Complete; Future      30 minutes spent on the date of the encounter doing chart review, history and exam, documentation and further activities as noted above       BMI:   Estimated body mass index is 39.54 kg/m  as calculated from the following:    Height as of this encounter: 1.676 m (5' 6\").    Weight as of this encounter: 111.1 kg (245 lb).           No follow-ups on file.    Nisreen Mayorga MD  [facility]    Vandana Huffman is a 26 year old who presents for the following health issues     HPI     Patient is here for evaluation of abnormal EKG. Her EKG demonstrated right axis deviation. She denies issued with shortness of breath, cough, palpitations. She denies having heart issues as a child.       Review of Systems   Constitutional, HEENT, cardiovascular, pulmonary, GI, , musculoskeletal, neuro, skin, endocrine and psych systems are negative, except as otherwise noted.      Objective    /85   Pulse 86   Ht 1.676 m (5' 6\")   Wt 111.1 kg (245 lb)   LMP 11/27/2020   BMI 39.54 kg/m    Body mass index is 39.54 kg/m .  Physical Exam   GENERAL: healthy, alert and no distress  EYES: Eyes grossly normal to inspection, PERRL and conjunctivae and sclerae normal  HENT: normal cephalic/atraumatic  RESP: lungs clear to auscultation - no rales, rhonchi or wheezes  CV: regular rate and rhythm, " normal S1 S2, no S3 or S4, no murmur, click or rub, no peripheral edema and peripheral pulses strong  ABDOMEN: soft, nontender, no hepatosplenomegaly, no masses and bowel sounds normal  MS: no gross musculoskeletal defects noted, no edema

## 2021-03-25 ENCOUNTER — MYC REFILL (OUTPATIENT)
Dept: OBGYN | Facility: CLINIC | Age: 26
End: 2021-03-25

## 2021-03-25 DIAGNOSIS — O09.91 SUPERVISION OF HIGH RISK PREGNANCY IN FIRST TRIMESTER: ICD-10-CM

## 2021-03-29 ENCOUNTER — VIRTUAL VISIT (OUTPATIENT)
Dept: EDUCATION SERVICES | Facility: CLINIC | Age: 26
End: 2021-03-29
Payer: COMMERCIAL

## 2021-03-29 DIAGNOSIS — O09.91 SUPERVISION OF HIGH RISK PREGNANCY IN FIRST TRIMESTER: Primary | ICD-10-CM

## 2021-03-29 DIAGNOSIS — O24.113 PREGNANCY COMPLICATED BY PRE-EXISTING TYPE 2 DIABETES IN THIRD TRIMESTER: Primary | ICD-10-CM

## 2021-03-29 PROCEDURE — 98966 PH1 ASSMT&MGMT NQHP 5-10: CPT

## 2021-03-29 RX ORDER — DOCUSATE SODIUM 100 MG/1
CAPSULE, LIQUID FILLED ORAL
Qty: 60 CAPSULE | Refills: 1 | Status: SHIPPED | OUTPATIENT
Start: 2021-03-29 | End: 2021-04-22

## 2021-03-29 NOTE — PROGRESS NOTES
Diabetes and Pregnancy Follow-up    Subjective/Objective:    Armida Da Silva was called for a scheduled BG review via . Last date of communication was: 3/22/2021.    Gestational diabetes is being managed with diet and activity    Taking diabetes medications: no    Estimated Date of Delivery: Sep 3, 2021    Blood Glucose/Ketone Log:    Date Ketones Fasting Post Breakfast Post Lunch Post Supper   3/23  85 120 116 113   3/24  87 109 125 120   3/25  82 106 114 120   3/26  85 125 127 115   3/27  82 130 119 124   3/28  95 102 126 130   3/29  90 119         Assessment:    Ketones: na.   Fasting blood glucoses: 100% in target.  After breakfast: 100% in target.  After lunch: 100% in target.  After dinner: 100% in target.    Plan/Response:  No changes in the patient's current treatment plan.  Follow-up in 1 week.    BRONWYN Mcmahon CDE  Time Spent: 9:43 minutes    Any diabetes medication dose changes were made via the CDE Protocol and Collaborative Practice Agreement with the patient's OB/GYN provider. A copy of this encounter was shared with the provider.

## 2021-04-01 ENCOUNTER — PRE VISIT (OUTPATIENT)
Dept: MATERNAL FETAL MEDICINE | Facility: CLINIC | Age: 26
End: 2021-04-01

## 2021-04-05 ENCOUNTER — OFFICE VISIT (OUTPATIENT)
Dept: MATERNAL FETAL MEDICINE | Facility: CLINIC | Age: 26
End: 2021-04-05
Attending: ADVANCED PRACTICE MIDWIFE
Payer: COMMERCIAL

## 2021-04-05 ENCOUNTER — HOSPITAL ENCOUNTER (OUTPATIENT)
Dept: ULTRASOUND IMAGING | Facility: CLINIC | Age: 26
End: 2021-04-05
Attending: ADVANCED PRACTICE MIDWIFE
Payer: COMMERCIAL

## 2021-04-05 ENCOUNTER — VIRTUAL VISIT (OUTPATIENT)
Dept: EDUCATION SERVICES | Facility: CLINIC | Age: 26
End: 2021-04-05
Payer: COMMERCIAL

## 2021-04-05 DIAGNOSIS — O26.90 PREGNANCY RELATED CONDITION, ANTEPARTUM: ICD-10-CM

## 2021-04-05 DIAGNOSIS — O24.119 PRE-EXISTING TYPE 2 DIABETES MELLITUS DURING PREGNANCY, ANTEPARTUM: Primary | ICD-10-CM

## 2021-04-05 DIAGNOSIS — O10.919 CHRONIC HYPERTENSION IN PREGNANCY: ICD-10-CM

## 2021-04-05 DIAGNOSIS — Z53.9 NO SHOW: Primary | ICD-10-CM

## 2021-04-05 PROCEDURE — 76811 OB US DETAILED SNGL FETUS: CPT

## 2021-04-05 PROCEDURE — 76811 OB US DETAILED SNGL FETUS: CPT | Mod: 26 | Performed by: OBSTETRICS & GYNECOLOGY

## 2021-04-05 RX ORDER — ASPIRIN 81 MG
100 TABLET, DELAYED RELEASE (ENTERIC COATED) ORAL DAILY
Qty: 60 TABLET | Refills: 0 | Status: SHIPPED | OUTPATIENT
Start: 2021-04-05 | End: 2022-01-27

## 2021-04-05 NOTE — PROGRESS NOTES
Attempted to call patient x2 - no answer either time. Left triage line for call back, and is already scheduled for a follow up call next Monday.  Bárbara Banks RD LD CDE    This patient was a no show for this scheduled appointment.

## 2021-04-05 NOTE — PROGRESS NOTES
Please refer to ultrasound report under 'Imaging' Studies of 'Chart Review' tabs.    Bryce Marie M.D.

## 2021-04-08 ENCOUNTER — OFFICE VISIT (OUTPATIENT)
Dept: OBGYN | Facility: CLINIC | Age: 26
End: 2021-04-08
Attending: OBSTETRICS & GYNECOLOGY
Payer: COMMERCIAL

## 2021-04-08 VITALS
HEART RATE: 99 BPM | SYSTOLIC BLOOD PRESSURE: 121 MMHG | HEIGHT: 66 IN | DIASTOLIC BLOOD PRESSURE: 82 MMHG | BODY MASS INDEX: 37.82 KG/M2 | WEIGHT: 235.3 LBS

## 2021-04-08 DIAGNOSIS — O10.919 HTN IN PREGNANCY, CHRONIC: ICD-10-CM

## 2021-04-08 DIAGNOSIS — O09.92 SUPERVISION OF HIGH RISK PREGNANCY IN SECOND TRIMESTER: Primary | ICD-10-CM

## 2021-04-08 DIAGNOSIS — R73.03 PREDIABETES: ICD-10-CM

## 2021-04-08 DIAGNOSIS — E66.812 CLASS 2 OBESITY WITH BODY MASS INDEX (BMI) OF 38.0 TO 38.9 IN ADULT, UNSPECIFIED OBESITY TYPE, UNSPECIFIED WHETHER SERIOUS COMORBIDITY PRESENT: ICD-10-CM

## 2021-04-08 PROCEDURE — G0463 HOSPITAL OUTPT CLINIC VISIT: HCPCS

## 2021-04-08 PROCEDURE — 99207 PR PRENATAL VISIT: CPT | Mod: GC | Performed by: OBSTETRICS & GYNECOLOGY

## 2021-04-08 ASSESSMENT — MIFFLIN-ST. JEOR: SCORE: 1834.06

## 2021-04-08 NOTE — PROGRESS NOTES
"[facility]  Return OB Visit    S: Doing well today, no concerns. Denies N/V. Checking BGs at home, does not have log today but as of Diabetic Educator visit 3/29 all values within goals. Denies headache, blurry vision, chest pain, shortness of breath. No vaginal bleeding, leaking of fluid. Mild cramping that is not persistent or painful. Started feeling fetal movement this week.    Pregnancy is complicated by:  - Type 2 diabetes  - Chronic hypertension    O: /82 (BP Location: Left arm, Patient Position: Chair)   Pulse 99   Ht 1.676 m (5' 6\")   Wt 106.7 kg (235 lb 4.8 oz)   LMP 2020   BMI 37.98 kg/m    Weight gain:   -1.905 kg (-4 lb 3.2 oz)    Gen: Well-appearing, NAD  FHR:  135-145 bpm    A/P:  Armida Da Silva is a 26 year old  at 13w0d by LMP cw 5w2d US, here for return OB visit. Pregnancy is complicated by pre-existing diabetes (see note 2021 for diagnoses details,) chronic hypertension, and hep B NI.    # Type 2 diabetes mellitus  - On metformin 500mg daily, continue, as BGs within goal  - Early A1c 5.6  - continue to follow with Diabetes Education - next visit , patient aware  - consider transition to insulin if BGs leave target range  [x] Level 2 US   - Recommend fetal echo, growth US q4 weeks, BPP bi-weekly starting 32 weeks   [x] ophthalmology, no issues    # Chronic HTN  - Baseline HELLP labs wnl  - on ASA for preE ppx  - /growth US per above  - Baseline EKG showed right axis deviation. She was seen by Dr. Mayorga who recommended echo, this was not yet completed  - Delivery at 38-39 weeks     #PNC: - Prenatal labs, Rh Pos, Mariia Neg, Hgb 13.7, HEP nl, Plt 419, Trep NR, UCx normal, Hep C neg, HIV neg, GC neg   - Vit D deficiency, recommend supplementation, she has this at home   - Immunizations - s/p flu 2020, Hep B nonimmune- s/p #1 . Did not get #2 today, needs next visit    - Genetic testing - declined GC referral    Return to clinic in 4 weeks.     Staffed with " Dr. Mark Khalil MD  ObGyn Resident, PGY1  April 8, 2021 11:37 AM    The patient was seen and examined with Dr. Khalil.  I have reviewed and agree with the above note.    Martha Flores MD, FACOG

## 2021-04-08 NOTE — LETTER
"2021       RE: Armida Da Silva   [address]  [place] [address]     Dear Colleague,    Thank you for referring your patient, Armida Da Silva, to the [facility] [place] [facility]. Please see a copy of my visit note below.    [facility]  Return OB Visit    S: Doing well today, no concerns. Denies N/V. Checking BGs at home, does not have log today but as of Diabetic Educator visit 3/29 all values within goals. Denies headache, blurry vision, chest pain, shortness of breath. No vaginal bleeding, leaking of fluid. Mild cramping that is not persistent or painful. Started feeling fetal movement this week.    Pregnancy is complicated by:  - Type 2 diabetes  - Chronic hypertension    O: /82 (BP Location: Left arm, Patient Position: Chair)   Pulse 99   Ht 1.676 m (5' 6\")   Wt 106.7 kg (235 lb 4.8 oz)   LMP 2020   BMI 37.98 kg/m    Weight gain:   -1.905 kg (-4 lb 3.2 oz)    Gen: Well-appearing, NAD  FHR:  135-145 bpm    A/P:  Armida Da Silva is a 26 year old  at 13w0d by LMP cw 5w2d US, here for return OB visit. Pregnancy is complicated by pre-existing diabetes (see note 2021 for diagnoses details,) chronic hypertension, and hep B NI.    # Type 2 diabetes mellitus  - On metformin 500mg daily, continue, as BGs within goal  - Early A1c 5.6  - continue to follow with Diabetes Education - next visit , patient aware  - consider transition to insulin if BGs leave target range  [x] Level 2 US   - Recommend fetal echo, growth US q4 weeks, BPP bi-weekly starting 32 weeks   [x] ophthalmology, no issues    # Chronic HTN  - Baseline HELLP labs wnl  - on ASA for preE ppx  - /growth US per above  - Baseline EKG showed right axis deviation. She was seen by Dr. Mayorga who recommended echo, this was not yet completed  - Delivery at 38-39 weeks     #PNC: - Prenatal labs, Rh Pos, Mariia Neg, Hgb 13.7, HEP nl, Plt 419, " Trep NR, UCx normal, Hep C neg, HIV neg, GC neg   - Vit D deficiency, recommend supplementation, she has this at home   - Immunizations - s/p flu 9/2020, Hep B nonimmune- s/p #1 2/26. Did not get #2 today, needs next visit    - Genetic testing - declined GC referral    Return to clinic in 4 weeks.     Staffed with Dr. Mark Khalil MD  ObGyn Resident, PGY1  April 8, 2021 11:37 AM    The patient was seen and examined with Dr. Khalil.  I have reviewed and agree with the above note.    Martha Flores MD, FACOG

## 2021-04-12 ENCOUNTER — VIRTUAL VISIT (OUTPATIENT)
Dept: EDUCATION SERVICES | Facility: CLINIC | Age: 26
End: 2021-04-12
Payer: COMMERCIAL

## 2021-04-12 DIAGNOSIS — O24.113 PREGNANCY COMPLICATED BY PRE-EXISTING TYPE 2 DIABETES IN THIRD TRIMESTER: Primary | ICD-10-CM

## 2021-04-12 PROCEDURE — 98966 PH1 ASSMT&MGMT NQHP 5-10: CPT | Mod: 95

## 2021-04-12 NOTE — PROGRESS NOTES
Gestational Diabetes Follow-up  Type of Service: Telephone Visit    How would patient like to obtain AVS? Not needed    Subjective/Objective:    Armida Da Silva was called for a scheduled BG review. Last date of communication was: 3/29/2021 (no show 4/5)    Gestational diabetes is being managed with diet and activity    Taking diabetes medications: no    Estimated Date of Delivery: Sep 3, 2021    Blood Glucose/Ketone Log:    Date Fasting Post Breakfast Post Lunch Post Supper   4/6 75 120 117 100   4/7 80 118 109 115   4/8 92 104 124 111   4/9 90 124 129 122   4/10 90 117 98 100   4/11 92 115 102 99   4/12 90 109           Assessment:    Ketones: not checking.   Fasting blood glucoses: 100% in target.  After breakfast: 100% in target.  After lunch: 100% in target.  After dinner: 100% in target.    Plan/Response:  No changes in the patient's current treatment plan.  Follow-up in 1 week.    BRONWYN Mcmahon CDE  Time Spent: 9:20 minutes    Any diabetes medication dose changes were made via the CDE Protocol and Collaborative Practice Agreement with the patient's OB/GYN provider. A copy of this encounter was shared with the provider.

## 2021-04-17 ENCOUNTER — TELEPHONE (OUTPATIENT)
Dept: OBGYN | Facility: CLINIC | Age: 26
End: 2021-04-17

## 2021-04-17 NOTE — TELEPHONE ENCOUNTER
called and interpreted for pt who is pregnant in second trimester.  Has new onset headache and runny nose. No ill contacts. No h/o allergies. Recommend Tylenol and push fluids.  Call if fever or vomiting or becomes quite ill.  has had COVID vaccination, pt has only had flu vaccine.  Lesa Mars MD

## 2021-04-18 ENCOUNTER — TELEPHONE (OUTPATIENT)
Dept: OBGYN | Facility: CLINIC | Age: 26
End: 2021-04-18

## 2021-04-19 ENCOUNTER — VIRTUAL VISIT (OUTPATIENT)
Dept: EDUCATION SERVICES | Facility: CLINIC | Age: 26
End: 2021-04-19
Payer: COMMERCIAL

## 2021-04-19 DIAGNOSIS — Z53.9 NO SHOW: Primary | ICD-10-CM

## 2021-04-19 NOTE — TELEPHONE ENCOUNTER
"Returned call to answering service regarding ear pain. Armida is a 27 y/o  at 20w2d, pregnancy c/b cHTN and T2DM.    Having ear pain, on and off, in both ears, but right greater than left. Continues to have headache and runny nose. No fever. No cough. No trouble breathing. No recent ill contacts that she is aware of. Taking tylenol but without relief.     Recommended claritin. I recommended not using a formulation with \"decongestant\" in the name given her existing HTN as it may further raise her BP. Discussed possible her sx are allergies vs viral illness, both of which should improve if we can get sinuses cleared. Also recommended steam and fluids. Encouraged to return call if she develops fever to order COVID test.     Questions answered with aid of .    Madalyn Rutledge MD, MSCI    [facility]/OBGYN     "

## 2021-04-19 NOTE — PROGRESS NOTES
No answer x2 attempts - left voice mail asking her to call triage line for BG review. Is scheduled next week.    Bárbara Banks RD LD CDE      This patient was a no show for this scheduled appointment.

## 2021-04-22 DIAGNOSIS — O09.91 SUPERVISION OF HIGH RISK PREGNANCY IN FIRST TRIMESTER: ICD-10-CM

## 2021-04-22 RX ORDER — DOCUSATE SODIUM 100 MG/1
CAPSULE, LIQUID FILLED ORAL
Qty: 60 CAPSULE | Refills: 1 | Status: ON HOLD | OUTPATIENT
Start: 2021-04-22 | End: 2021-08-29

## 2021-04-25 ENCOUNTER — HEALTH MAINTENANCE LETTER (OUTPATIENT)
Age: 26
End: 2021-04-25

## 2021-04-28 ENCOUNTER — OFFICE VISIT (OUTPATIENT)
Dept: MATERNAL FETAL MEDICINE | Facility: CLINIC | Age: 26
End: 2021-04-28
Attending: OBSTETRICS & GYNECOLOGY
Payer: COMMERCIAL

## 2021-04-28 ENCOUNTER — HOSPITAL ENCOUNTER (OUTPATIENT)
Dept: ULTRASOUND IMAGING | Facility: CLINIC | Age: 26
End: 2021-04-28
Attending: OBSTETRICS & GYNECOLOGY
Payer: COMMERCIAL

## 2021-04-28 DIAGNOSIS — O24.119 PRE-EXISTING TYPE 2 DIABETES MELLITUS DURING PREGNANCY, ANTEPARTUM: ICD-10-CM

## 2021-04-28 DIAGNOSIS — O10.919 CHRONIC HYPERTENSION IN PREGNANCY: ICD-10-CM

## 2021-04-28 DIAGNOSIS — O24.119 PRE-EXISTING TYPE 2 DIABETES MELLITUS DURING PREGNANCY, ANTEPARTUM: Primary | ICD-10-CM

## 2021-04-28 PROCEDURE — 76816 OB US FOLLOW-UP PER FETUS: CPT

## 2021-04-28 PROCEDURE — 76816 OB US FOLLOW-UP PER FETUS: CPT | Mod: 26 | Performed by: OBSTETRICS & GYNECOLOGY

## 2021-04-28 NOTE — PROGRESS NOTES
Please see ultrasound report under Imaging tab for details of today's ultrasound.    Alba Plascencia MD  Maternal-Fetal Medicine

## 2021-05-03 ENCOUNTER — VIRTUAL VISIT (OUTPATIENT)
Dept: EDUCATION SERVICES | Facility: CLINIC | Age: 26
End: 2021-05-03
Payer: COMMERCIAL

## 2021-05-03 DIAGNOSIS — O24.112 PREGNANCY COMPLICATED BY PRE-EXISTING TYPE 2 DIABETES IN SECOND TRIMESTER: Primary | ICD-10-CM

## 2021-05-03 PROCEDURE — 98967 PH1 ASSMT&MGMT NQHP 11-20: CPT | Mod: 95

## 2021-05-03 NOTE — PROGRESS NOTES
Diabetes and Pregnancy Follow-up  Type of Service: Telephone Visit    How would patient like to obtain AVS? Not needed    Subjective/Objective:    Armida Da Silva was called for a scheduled BG review. Last date of communication was: 4/26/2021.    Gestational diabetes is being managed with diet and activity    Taking diabetes medications: no    Estimated Date of Delivery: Sep 3, 2021    Blood Glucose/Ketone Log:    Date Ketones Fasting Post Breakfast Post Lunch Post Supper   4/27  84 120 100 113   4/28  91 126 129 103   4/29  88 140 110 109   4/30  80 122 109 120   5/1  75 103 115 109   5/2  96 130 84 133   5/3  95 125            Assessment:    Ketones: na.   Fasting blood glucoses: 85% in target.  After breakfast: 100% in target.  After lunch: 100% in target.  After dinner: 100% in target.    Fasting numbers are starting to rise, she recently changed her bedtime snack from bread and beans to just bread. Encouraged some protein with bedtime snack (nuts, meat, cheese, etc) to help with fasting number.     Plan/Response:  No changes in the patient's current treatment plan.  Follow-up in 1 week.    BRNOWYN Mcmahon CDE  Time Spent: 15:28 minutes    Any diabetes medication dose changes were made via the CDE Protocol and Collaborative Practice Agreement with the patient's OB/GYN provider. A copy of this encounter was shared with the provider.

## 2021-05-10 ENCOUNTER — VIRTUAL VISIT (OUTPATIENT)
Dept: EDUCATION SERVICES | Facility: CLINIC | Age: 26
End: 2021-05-10
Payer: COMMERCIAL

## 2021-05-10 DIAGNOSIS — O24.112 PREGNANCY COMPLICATED BY PRE-EXISTING TYPE 2 DIABETES IN SECOND TRIMESTER: Primary | ICD-10-CM

## 2021-05-10 PROCEDURE — 98967 PH1 ASSMT&MGMT NQHP 11-20: CPT | Mod: 95

## 2021-05-10 NOTE — PROGRESS NOTES
Diabetes and Pregnancy Follow-up  Type of Service: Telephone Visit    How would patient like to obtain AVS? Not needed    Subjective/Objective:    Armida Da Silva was called for a scheduled BG review. Last date of communication was: 5/3/2021.    Gestational diabetes is being managed with diet and activity    Taking diabetes medications: no    Estimated Date of Delivery: Sep 3, 2021    Blood Glucose/Ketone Log:    Date Fasting Post Breakfast Post Lunch Post Supper   5/4 81 106 105 120   5/5 83 120 127 116   5/6 80 130 120 118   5/7 76 127 126 120   5/8 79 100 119 112   5/9 84 102 128 100   5/10 80 117           Assessment:    Fasting blood glucoses: 100% in target.  After breakfast: 100% in target.  After lunch: 100% in target.  After dinner: 100% in target.    Plan/Response:  No changes in the patient's current treatment plan.  Follow-up in 1 week.    BRONWYN Mcmahon CDE  Time Spent: 11:20 minutes    Any diabetes medication dose changes were made via the CDE Protocol and Collaborative Practice Agreement with the patient's OB/GYN provider. A copy of this encounter was shared with the provider.

## 2021-05-14 ENCOUNTER — OFFICE VISIT (OUTPATIENT)
Dept: CARDIOLOGY | Facility: CLINIC | Age: 26
End: 2021-05-14
Payer: COMMERCIAL

## 2021-05-14 ENCOUNTER — HOSPITAL ENCOUNTER (OUTPATIENT)
Dept: CARDIOLOGY | Facility: CLINIC | Age: 26
Discharge: HOME | End: 2021-05-14
Attending: OBSTETRICS & GYNECOLOGY | Admitting: OBSTETRICS & GYNECOLOGY
Payer: COMMERCIAL

## 2021-05-14 DIAGNOSIS — O24.119 PRE-EXISTING TYPE 2 DIABETES MELLITUS DURING PREGNANCY, ANTEPARTUM: ICD-10-CM

## 2021-05-14 DIAGNOSIS — O35.BXX0 FETAL CARDIAC DISEASE AFFECTING PREGNANCY, SINGLE OR UNSPECIFIED FETUS: Primary | ICD-10-CM

## 2021-05-14 PROCEDURE — 99202 OFFICE O/P NEW SF 15 MIN: CPT | Mod: 25 | Performed by: PEDIATRICS

## 2021-05-14 PROCEDURE — 76825 ECHO EXAM OF FETAL HEART: CPT | Mod: 26 | Performed by: PEDIATRICS

## 2021-05-14 PROCEDURE — 76827 ECHO EXAM OF FETAL HEART: CPT | Mod: 26 | Performed by: PEDIATRICS

## 2021-05-14 PROCEDURE — 93325 DOPPLER ECHO COLOR FLOW MAPG: CPT

## 2021-05-14 PROCEDURE — 93325 DOPPLER ECHO COLOR FLOW MAPG: CPT | Mod: 26 | Performed by: PEDIATRICS

## 2021-05-14 NOTE — PROGRESS NOTES
[facility]   Heart Center Fetal Consult Note    Patient:  Armida Da Silva MRN:  [id number]   YOB: 1995 Age:  26 year old   Date of Visit:  2021 PCP:  No Ref-Primary, Physician     Dear Dr. Marie,     I had the pleasure of seeing Armida Da Silva at the [facility] on 2021 in fetal cardiology consultation for fetal echocardiogram results. She presented today accompanied by herself. As you know, she is a 26 year old  at 24w0d who presented for fetal echocardiogram today because of pre-gestational diabetes mellitus.    I performed and interpreted the fetal echocardiogram today, which demonstrated likely normal fetal echocardiogram. Normal fetal intracardiac connections. Normal right and left ventricular size and function. The aortic isthmus was not well visualized; no indirect evidence of coarctation of the aorta. Fetal heart rate is regular at 134 bpm. No hydrops.    I reviewed the echo findings today with Armida Da Silva with the assistance of phone . Although this was a technically difficult study, the patient is aware that no obvious cardiac abnormalities were identified. She is aware of the general limitations of fetal echocardiography. No additional fetal echocardiograms are recommended. No  cardiac follow-up is required.     Thank you for allowing me to participate in Claudias care. Please do not hesitate to contact me with questions or concerns.    This visit was separate from the performance and interpretation of the ultrasound. The majority of the time (>50%) was spent in counseling and coordination of care. I spent approximately 15 minutes in face-to-face time reviewing the above considerations.    Ankit Garcia M.D.  Pediatric Cardiology  [address], 5th floor, [place] [address]  Phone [phone]  Fax [phone]

## 2021-05-17 ENCOUNTER — APPOINTMENT (OUTPATIENT)
Dept: INTERPRETER SERVICES | Facility: CLINIC | Age: 26
End: 2021-05-17
Payer: COMMERCIAL

## 2021-05-17 ENCOUNTER — VIRTUAL VISIT (OUTPATIENT)
Dept: EDUCATION SERVICES | Facility: CLINIC | Age: 26
End: 2021-05-17
Payer: COMMERCIAL

## 2021-05-17 DIAGNOSIS — O24.112 PREGNANCY COMPLICATED BY PRE-EXISTING TYPE 2 DIABETES IN SECOND TRIMESTER: Primary | ICD-10-CM

## 2021-05-17 PROCEDURE — 98966 PH1 ASSMT&MGMT NQHP 5-10: CPT | Mod: 95

## 2021-05-17 NOTE — PROGRESS NOTES
Diabetes and Pregnancy Follow-up  Type of Service: Telephone Visit    How would patient like to obtain AVS? Not needed    Subjective/Objective:    Armida Da Sivla was called for a scheduled BG review via [employer]  (ID [id number]). Last date of communication was: 5/10/2021.    Gestational diabetes is being managed with diet and activity    Taking diabetes medications: no    Estimated Date of Delivery: Sep 3, 2021    Blood Glucose/Ketone Log:    Date Fasting Post Breakfast Post Lunch Post Supper   5/11 81 123 113 119   5/12 78 125 102 109   5/13 81 122 115 106   5/14 80 105 83 100   5/15 83 113 85 110   5/16 90 125 120 109   5/17 82 121         Assessment:    Fasting blood glucoses: 100% in target.  After breakfast: 100% in target.  After lunch: 100% in target.  After dinner: 100% in target.    Plan/Response:  No changes in the patient's current treatment plan.  Follow-up in 1 week.    BRONWYN Mcmahon CDE  Time Spent: 8:30 minutes    Any diabetes medication dose changes were made via the CDE Protocol and Collaborative Practice Agreement with the patient's OB/GYN provider. A copy of this encounter was shared with the provider.

## 2021-05-24 ENCOUNTER — APPOINTMENT (OUTPATIENT)
Dept: INTERPRETER SERVICES | Facility: CLINIC | Age: 26
End: 2021-05-24
Payer: COMMERCIAL

## 2021-05-24 ENCOUNTER — VIRTUAL VISIT (OUTPATIENT)
Dept: EDUCATION SERVICES | Facility: CLINIC | Age: 26
End: 2021-05-24
Payer: COMMERCIAL

## 2021-05-24 DIAGNOSIS — O24.112 PREGNANCY COMPLICATED BY PRE-EXISTING TYPE 2 DIABETES IN SECOND TRIMESTER: Primary | ICD-10-CM

## 2021-05-24 PROCEDURE — 98966 PH1 ASSMT&MGMT NQHP 5-10: CPT | Mod: 95

## 2021-05-24 NOTE — PROGRESS NOTES
Diabetes and Pregnancy Follow-up  Type of Service: Telephone Visit    How would patient like to obtain AVS? Not needed    Subjective/Objective:    Armida Da Silva was called for a scheduled BG review. Last date of communication was: 5/17/2021.    Gestational diabetes is being managed with diet and activity    Taking diabetes medications: no    Estimated Date of Delivery: Sep 3, 2021    Blood Glucose/Ketone Log:    Date Ketones Fasting Post Breakfast Post Lunch Post Supper   5/18  85 130 102 95   5/19  81 122 102 100   5/20  75 133 124 113   5/21  78 126 117 100   5/22  80 120 127 113   5/23  82 116 111 102   5/24  85 117           Assessment:    Ketones: na.   Fasting blood glucoses: 100% in target.  After breakfast: 100% in target.  After lunch: 100% in target.  After dinner: 100% in target.    Plan/Response:  No changes in the patient's current treatment plan.  Follow-up in 2 weeks    BRONWYN Mcmahon CDE  Time Spent: 6:36 minutes    Any diabetes medication dose changes were made via the CDE Protocol and Collaborative Practice Agreement with the patient's OB/GYN provider. A copy of this encounter was shared with the provider.

## 2021-05-27 ENCOUNTER — HOSPITAL ENCOUNTER (OUTPATIENT)
Dept: ULTRASOUND IMAGING | Facility: CLINIC | Age: 26
End: 2021-05-27
Attending: OBSTETRICS & GYNECOLOGY
Payer: COMMERCIAL

## 2021-05-27 ENCOUNTER — OFFICE VISIT (OUTPATIENT)
Dept: MATERNAL FETAL MEDICINE | Facility: CLINIC | Age: 26
End: 2021-05-27
Attending: OBSTETRICS & GYNECOLOGY
Payer: COMMERCIAL

## 2021-05-27 DIAGNOSIS — O24.119 PRE-EXISTING TYPE 2 DIABETES MELLITUS DURING PREGNANCY, ANTEPARTUM: ICD-10-CM

## 2021-05-27 DIAGNOSIS — O24.119 PRE-EXISTING TYPE 2 DIABETES MELLITUS DURING PREGNANCY, ANTEPARTUM: Primary | ICD-10-CM

## 2021-05-27 PROCEDURE — 76816 OB US FOLLOW-UP PER FETUS: CPT

## 2021-05-27 PROCEDURE — 76816 OB US FOLLOW-UP PER FETUS: CPT | Mod: 26 | Performed by: OBSTETRICS & GYNECOLOGY

## 2021-05-28 NOTE — PROGRESS NOTES
"Please see \"Imaging\" tab under \"Chart Review\" for details of today's US.    Layla Olivera, DO    "

## 2021-06-07 ENCOUNTER — VIRTUAL VISIT (OUTPATIENT)
Dept: EDUCATION SERVICES | Facility: CLINIC | Age: 26
End: 2021-06-07
Payer: COMMERCIAL

## 2021-06-07 DIAGNOSIS — O24.112 PREGNANCY COMPLICATED BY PRE-EXISTING TYPE 2 DIABETES IN SECOND TRIMESTER: Primary | ICD-10-CM

## 2021-06-07 PROCEDURE — 98966 PH1 ASSMT&MGMT NQHP 5-10: CPT | Mod: 95

## 2021-06-07 NOTE — PROGRESS NOTES
Diabetes and Pregnancy Follow-up  Type of Service: Telephone Visit    How would patient like to obtain AVS? Not needed    Subjective/Objective:    Armida Da Silva was called for a scheduled BG review. Last date of communication was: 5/24/2021.    Gestational diabetes is being managed with diet and activity    Taking diabetes medications: no    Estimated Date of Delivery: Sep 3, 2021    Blood Glucose/Ketone Log:    Date Ketones Fasting Post Breakfast Post Lunch Post Supper   6/1  92 128 130 117   6/2  85 110 126 118   6/3  87 130 117 102   6/4  82 98 101 115   6/5  75 115 117 124   6/6  77 114 117 120   6/7  80 124           Assessment:    Ketones: na.   Fasting blood glucoses: 100% in target.  After breakfast: 100% in target.  After lunch: 100% in target.  After dinner: 100% in target.    Plan/Response:  No changes in the patient's current treatment plan.  Follow-up in 1 week.    BRONWYN Mcmahon [facility]  Time Spent: 7:56 minutes    Any diabetes medication dose changes were made via the CDE Protocol and Collaborative Practice Agreement with the patient's OB/GYN provider. A copy of this encounter was shared with the provider.

## 2021-06-14 ENCOUNTER — APPOINTMENT (OUTPATIENT)
Dept: INTERPRETER SERVICES | Facility: CLINIC | Age: 26
End: 2021-06-14
Payer: COMMERCIAL

## 2021-06-14 ENCOUNTER — TELEPHONE (OUTPATIENT)
Dept: OBGYN | Facility: CLINIC | Age: 26
End: 2021-06-14

## 2021-06-14 ENCOUNTER — VIRTUAL VISIT (OUTPATIENT)
Dept: EDUCATION SERVICES | Facility: CLINIC | Age: 26
End: 2021-06-14
Payer: COMMERCIAL

## 2021-06-14 DIAGNOSIS — O24.112 PREGNANCY COMPLICATED BY PRE-EXISTING TYPE 2 DIABETES IN SECOND TRIMESTER: Primary | ICD-10-CM

## 2021-06-14 PROCEDURE — 98966 PH1 ASSMT&MGMT NQHP 5-10: CPT | Mod: 95

## 2021-06-14 NOTE — TELEPHONE ENCOUNTER
Call received from patient and [place]  requesting letter stating patient's due date, that she is pregnant, and receiving care. Requests letter be emailed to [email or web address]    Advised letter would be written and sent as requested. Pt denied further questions or concerns.    Letter written and sent and emailed to [email or web address]

## 2021-06-14 NOTE — PROGRESS NOTES
Diabetes and Pregnancy Follow-up  Type of Service: Telephone Visit    How would patient like to obtain AVS? Not needed    Subjective/Objective:    Armida Da Silva was called for a scheduled BG review via . Last date of communication was: 6/7/2021.    Gestational diabetes is being managed with diet and activity    Taking diabetes medications: no    Estimated Date of Delivery: Sep 3, 2021    Blood Glucose/Ketone Log:    Date Ketones Fasting Post Breakfast Post Lunch Post Supper   6/8  81 103 122 113   6/9  80 115 124 118   6/10  85 124 120 122   6/11  90 127 118 115   6/12  86 120 126 115   6/13  80 103 117 120   6/14  75 125         Assessment:    Fasting blood glucoses: 100% in target.  After breakfast: 100% in target.  After lunch: 100% in target.  After dinner: 100% in target.    Plan/Response:  No changes in the patient's current treatment plan.  Follow-up in 1 week.    BRONWYN Mcmahon [facility]  Time Spent: 7:49 minutes    Any diabetes medication dose changes were made via the CDE Protocol and Collaborative Practice Agreement with the patient's OB/GYN provider. A copy of this encounter was shared with the provider.

## 2021-06-14 NOTE — LETTER
June 14, 2021    To Whom It May Concern:    Armida Da Silva is pregnant and being seen in our clinic for prenatal care.      Her Estimated Date of Delivery: Sep 3, 2021.      LMP:  Patient's last menstrual period was 11/27/2020.     Sincerely,        Carmenza Cramer RN sent on behalf of  Martha Flores MD

## 2021-06-20 ENCOUNTER — HEALTH MAINTENANCE LETTER (OUTPATIENT)
Age: 26
End: 2021-06-20

## 2021-06-21 ENCOUNTER — VIRTUAL VISIT (OUTPATIENT)
Dept: EDUCATION SERVICES | Facility: CLINIC | Age: 26
End: 2021-06-21
Payer: COMMERCIAL

## 2021-06-21 DIAGNOSIS — Z53.9 NO SHOW: Primary | ICD-10-CM

## 2021-06-21 NOTE — PROGRESS NOTES
Attempted to call patient x2, no answer. Did leave message via  to call back triage line to review numbers.    [facility]    This patient was a no show for this scheduled appointment.

## 2021-06-28 ENCOUNTER — VIRTUAL VISIT (OUTPATIENT)
Dept: EDUCATION SERVICES | Facility: CLINIC | Age: 26
End: 2021-06-28
Payer: COMMERCIAL

## 2021-06-28 DIAGNOSIS — O24.112 PREGNANCY COMPLICATED BY PRE-EXISTING TYPE 2 DIABETES IN SECOND TRIMESTER: Primary | ICD-10-CM

## 2021-06-28 PROCEDURE — 98966 PH1 ASSMT&MGMT NQHP 5-10: CPT | Mod: 95

## 2021-06-28 NOTE — PROGRESS NOTES
Diabetes and Pregnancy Follow-up  Type of Service: Telephone Visit    How would patient like to obtain AVS? Not needed    Subjective/Objective:    Armida Da Silva was called for a scheduled BG review via . Last date of communication was: 6/14/2021 (no showed 6/21).    Gestational diabetes is being managed with diet and activity    Taking diabetes medications: no    Estimated Date of Delivery: Sep 3, 2021    Blood Glucose/Ketone Log:    Date Ketones Fasting Post Breakfast Post Lunch Post Supper   6/22  90 117 125 119   6/23  75 132 101 93   6/24  78 109 116 110   6/25  76 108 127 120   6/26  81 117 122 112   6/27  85 128 109 121   6/28  87 115         Assessment:    Ketones: na.   Fasting blood glucoses: 100% in target.  After breakfast: 100% in target.  After lunch: 100% in target.  After dinner: 100% in target.    Plan/Response:  No changes in the patient's current treatment plan.  Follow-up in 2 weeks.    BRONWYN Mcmahon [facility]  Time Spent: 7:03 minutes    Any diabetes medication dose changes were made via the CDE Protocol and Collaborative Practice Agreement with the patient's OB/GYN provider. A copy of this encounter was shared with the provider.

## 2021-06-28 NOTE — LETTER
6/28/2021         RE: Armida Da Silva  [address]  [place] [address]        Dear Colleague,    Thank you for referring your patient, Armida Da Silva, to the [facility]. Please see a copy of my visit note below.    Diabetes and Pregnancy Follow-up  Type of Service: Telephone Visit    How would patient like to obtain AVS? Not needed    Subjective/Objective:    Armida Da Silva was called for a scheduled BG review via . Last date of communication was: 6/14/2021 (no showed 6/21).    Gestational diabetes is being managed with diet and activity    Taking diabetes medications: no    Estimated Date of Delivery: Sep 3, 2021    Blood Glucose/Ketone Log:    Date Ketones Fasting Post Breakfast Post Lunch Post Supper   6/22  90 117 125 119   6/23  75 132 101 93   6/24  78 109 116 110   6/25  76 108 127 120   6/26  81 117 122 112   6/27  85 128 109 121   6/28  87 115         Assessment:    Ketones: na.   Fasting blood glucoses: 100% in target.  After breakfast: 100% in target.  After lunch: 100% in target.  After dinner: 100% in target.    Plan/Response:  No changes in the patient's current treatment plan.  Follow-up in 2 weeks.    BR[facility] Mcmahon [facility]  Time Spent: 7:03 minutes    Any diabetes medication dose changes were made via the CDE Protocol and Collaborative Practice Agreement with the patient's OB/GYN provider. A copy of this encounter was shared with the provider.

## 2021-07-08 ENCOUNTER — TELEPHONE (OUTPATIENT)
Dept: OBGYN | Facility: CLINIC | Age: 26
End: 2021-07-08

## 2021-07-08 NOTE — TELEPHONE ENCOUNTER
Pt's  called stating that the pt has pain in her molars.   states that he thinks one of her teeth is broken.  This writer gave the dentistry phone number.

## 2021-07-12 ENCOUNTER — VIRTUAL VISIT (OUTPATIENT)
Dept: EDUCATION SERVICES | Facility: CLINIC | Age: 26
End: 2021-07-12
Payer: COMMERCIAL

## 2021-07-12 DIAGNOSIS — O24.112 PREGNANCY COMPLICATED BY PRE-EXISTING TYPE 2 DIABETES IN SECOND TRIMESTER: Primary | ICD-10-CM

## 2021-07-12 PROCEDURE — 98966 PH1 ASSMT&MGMT NQHP 5-10: CPT | Mod: 95

## 2021-07-12 NOTE — PROGRESS NOTES
Diabetes and Pregnancy Follow-up  Type of Service: Telephone Visit    How would patient like to obtain AVS? Not needed    Subjective/Objective:    Armida Da Silva was called for a scheduled BG review via  (ID [id number]). Last date of communication was: 7/5/2021.    Gestational diabetes is being managed with diet and activity    Taking diabetes medications: no    Estimated Date of Delivery: Sep 3, 2021    Blood Glucose/Ketone Log:    Date Ketones Fasting Post Breakfast Post Lunch Post Supper   7/6  85 120 112 103   7/7  87 119 123 100   7/8  90 125 117 120   7/9  86 115 121 110   7/10  88 130 126 113   7/11  87 127 121 110   7/12  90 118           Assessment:    Ketones: na.   Fasting blood glucoses: 100% in target.  After breakfast: 100% in target.  After lunch: 100% in target.  After dinner: 100% in target.    Plan/Response:  No changes in the patient's current treatment plan.  Follow-up in 1 week.    [facility] Mcmahon [facility]  Time Spent: 9:06 minutes    Any diabetes medication dose changes were made via the CDE Protocol and Collaborative Practice Agreement with the patient's OB/GYN provider. A copy of this encounter was shared with the provider.

## 2021-07-19 ENCOUNTER — VIRTUAL VISIT (OUTPATIENT)
Dept: EDUCATION SERVICES | Facility: CLINIC | Age: 26
End: 2021-07-19
Payer: COMMERCIAL

## 2021-07-19 DIAGNOSIS — Z53.9 NO SHOW: Primary | ICD-10-CM

## 2021-07-19 NOTE — PROGRESS NOTES
Unable to reach patient. Left a message to call our triage line to review her numbers, reminded her of our next appointment 7/26 @ 2pm  BRONWYN Mcmahon [facility]    This patient was a no show for this scheduled appointment.

## 2021-07-23 DIAGNOSIS — O09.91 SUPERVISION OF HIGH RISK PREGNANCY IN FIRST TRIMESTER: ICD-10-CM

## 2021-07-23 RX ORDER — ASPIRIN 81 MG/1
TABLET, DELAYED RELEASE ORAL
Qty: 30 TABLET | Refills: 17 | Status: ON HOLD | OUTPATIENT
Start: 2021-07-23 | End: 2021-08-29

## 2021-07-26 ENCOUNTER — VIRTUAL VISIT (OUTPATIENT)
Dept: EDUCATION SERVICES | Facility: CLINIC | Age: 26
End: 2021-07-26
Payer: COMMERCIAL

## 2021-07-26 DIAGNOSIS — O24.112 PREGNANCY COMPLICATED BY PRE-EXISTING TYPE 2 DIABETES IN SECOND TRIMESTER: Primary | ICD-10-CM

## 2021-07-26 PROCEDURE — 98966 PH1 ASSMT&MGMT NQHP 5-10: CPT | Mod: 95

## 2021-07-26 NOTE — PROGRESS NOTES
Diabetes and Pregnancy Follow-up  Type of Service: Telephone Visit    How would patient like to obtain AVS? Not needed    Subjective/Objective:    Armida Da Silva was called for a scheduled BG review via . Last date of communication was: 7/12/2021.    Gestational diabetes is being managed with diet and activity, and Metformin    Taking diabetes medications:   yes:     Diabetes Medication(s)     Biguanides       metFORMIN (GLUCOPHAGE) 850 MG tablet    TAKE 1 TABLET BY MOUTH ONCE DAILY WITH BREAKFAST          Estimated Date of Delivery: Sep 3, 2021    Blood Glucose/Ketone Log:    Date Ketones Fasting Post Breakfast Post Lunch Post Supper   7/20  78 109 118 102   7/21  75 106 120 100   7/22  78 107 123 116   7/23  80 116 127 119   7/24  78 108 94 124   7/25  83 124 130 117   7/26  87 122           Assessment:    Ketones: not testing   Fasting blood glucoses: 100% in target.  After breakfast: 100% in target.  After lunch: 100% in target.  After dinner: 100% in target.    Plan/Response:  No changes in the patient's current treatment plan.  Follow-up in 1 week.    BRONWYN Mcmahon [facility]  Time Spent: 9:56 minutes    Any diabetes medication dose changes were made via the CDE Protocol and Collaborative Practice Agreement with the patient's OB/GYN provider. A copy of this encounter was shared with the provider.

## 2021-07-27 ENCOUNTER — OFFICE VISIT (OUTPATIENT)
Dept: OBGYN | Facility: CLINIC | Age: 26
End: 2021-07-27
Attending: OBSTETRICS & GYNECOLOGY
Payer: COMMERCIAL

## 2021-07-27 VITALS
SYSTOLIC BLOOD PRESSURE: 126 MMHG | HEART RATE: 90 BPM | BODY MASS INDEX: 38.46 KG/M2 | WEIGHT: 239.3 LBS | HEIGHT: 66 IN | DIASTOLIC BLOOD PRESSURE: 84 MMHG

## 2021-07-27 DIAGNOSIS — O24.113 PRE-EXISTING TYPE 2 DIABETES MELLITUS DURING PREGNANCY IN THIRD TRIMESTER: ICD-10-CM

## 2021-07-27 DIAGNOSIS — K21.00 GASTROESOPHAGEAL REFLUX DISEASE WITH ESOPHAGITIS WITHOUT HEMORRHAGE: ICD-10-CM

## 2021-07-27 DIAGNOSIS — O09.93 SUPERVISION OF HIGH RISK PREGNANCY IN THIRD TRIMESTER: Primary | ICD-10-CM

## 2021-07-27 PROCEDURE — 90471 IMMUNIZATION ADMIN: CPT

## 2021-07-27 PROCEDURE — G0463 HOSPITAL OUTPT CLINIC VISIT: HCPCS

## 2021-07-27 PROCEDURE — 250N000011 HC RX IP 250 OP 636

## 2021-07-27 PROCEDURE — 99207 PR PRENATAL VISIT: CPT | Performed by: OBSTETRICS & GYNECOLOGY

## 2021-07-27 PROCEDURE — 90715 TDAP VACCINE 7 YRS/> IM: CPT

## 2021-07-27 RX ORDER — LANSOPRAZOLE 30 MG/1
30 CAPSULE, DELAYED RELEASE ORAL DAILY
Qty: 30 CAPSULE | Refills: 3 | Status: SHIPPED | OUTPATIENT
Start: 2021-07-27 | End: 2022-01-27

## 2021-07-27 ASSESSMENT — MIFFLIN-ST. JEOR: SCORE: 1852.21

## 2021-07-27 NOTE — NURSING NOTE
Chief Complaint   Patient presents with     Prenatal Care     34 WEEKS AND 4 DAYS       
cane, straight

## 2021-07-27 NOTE — PROGRESS NOTES
S:  Pt presents for prenatal visit today-hasn't been seen here since 18 weeks, last growth ultrasound was at 25+6 weeks.  Has had frequent phone follow up with dietician in endocrine clinic.  She continues on once daily metformin with all sugars at or below goal at her follow up yesterday.  She is agreeable to tdap, hasn't had Covid vaccine, last hgb was in .  Reports good FM, no contractions.  Only bothersome symptom is reflux-asking about a medication for that.      O: see flow    A:  27 y/o  at 34+6 weeks, doing well.  Pregnancy complicated by pre-existing DM on metformin, chronic HTN on no medication, BMI 38, lost to follow up since 18 weeks.     P:  Continue daily metformin, schedule growth ultrasound ASAP with twice weekly BPPs to begin this week, labs today-CBC, vitamin D, repeat hgb A1C, tdap.  Reviewed the benefits and recommendation to start the Covid vaccine series as soon as possible.  Rx for Prevacid sent. RTC 1 week.      Martha Flores MD, FACOG

## 2021-07-28 ENCOUNTER — LAB (OUTPATIENT)
Dept: LAB | Facility: CLINIC | Age: 26
End: 2021-07-28
Payer: COMMERCIAL

## 2021-07-28 DIAGNOSIS — O09.93 SUPERVISION OF HIGH RISK PREGNANCY IN THIRD TRIMESTER: ICD-10-CM

## 2021-07-28 DIAGNOSIS — O24.113 PRE-EXISTING TYPE 2 DIABETES MELLITUS DURING PREGNANCY IN THIRD TRIMESTER: ICD-10-CM

## 2021-07-28 LAB
DEPRECATED CALCIDIOL+CALCIFEROL SERPL-MC: 79 UG/L (ref 20–75)
ERYTHROCYTE [DISTWIDTH] IN BLOOD BY AUTOMATED COUNT: 13.9 % (ref 10–15)
HBA1C MFR BLD: 5.2 % (ref 0–5.6)
HCT VFR BLD AUTO: 38.7 % (ref 35–47)
HGB BLD-MCNC: 12.7 G/DL (ref 11.7–15.7)
MCH RBC QN AUTO: 28 PG (ref 26.5–33)
MCHC RBC AUTO-ENTMCNC: 32.8 G/DL (ref 31.5–36.5)
MCV RBC AUTO: 85 FL (ref 78–100)
PLATELET # BLD AUTO: 305 10E3/UL (ref 150–450)
RBC # BLD AUTO: 4.54 10E6/UL (ref 3.8–5.2)
WBC # BLD AUTO: 8.3 10E3/UL (ref 4–11)

## 2021-07-28 PROCEDURE — 36415 COLL VENOUS BLD VENIPUNCTURE: CPT

## 2021-07-28 PROCEDURE — 85048 AUTOMATED LEUKOCYTE COUNT: CPT

## 2021-07-28 PROCEDURE — 82306 VITAMIN D 25 HYDROXY: CPT

## 2021-07-28 PROCEDURE — 83036 HEMOGLOBIN GLYCOSYLATED A1C: CPT

## 2021-07-29 DIAGNOSIS — O09.93 SUPERVISION OF HIGH RISK PREGNANCY IN THIRD TRIMESTER: Primary | ICD-10-CM

## 2021-07-30 ENCOUNTER — TELEPHONE (OUTPATIENT)
Dept: OBGYN | Facility: CLINIC | Age: 26
End: 2021-07-30

## 2021-07-30 ENCOUNTER — ANCILLARY PROCEDURE (OUTPATIENT)
Dept: ULTRASOUND IMAGING | Facility: CLINIC | Age: 26
End: 2021-07-30
Attending: OBSTETRICS & GYNECOLOGY
Payer: COMMERCIAL

## 2021-07-30 DIAGNOSIS — O09.93 SUPERVISION OF HIGH RISK PREGNANCY IN THIRD TRIMESTER: ICD-10-CM

## 2021-07-30 PROCEDURE — 76819 FETAL BIOPHYS PROFIL W/O NST: CPT | Mod: 26 | Performed by: OBSTETRICS & GYNECOLOGY

## 2021-07-30 PROCEDURE — 76816 OB US FOLLOW-UP PER FETUS: CPT | Mod: 26 | Performed by: OBSTETRICS & GYNECOLOGY

## 2021-07-30 PROCEDURE — 76816 OB US FOLLOW-UP PER FETUS: CPT

## 2021-07-30 PROCEDURE — 76819 FETAL BIOPHYS PROFIL W/O NST: CPT

## 2021-07-30 NOTE — TELEPHONE ENCOUNTER
Left message via  to call back and schedule appointments. Patient came up to schedule at 5:30pm on Tuesday 7/27, scheduled a BPP with growth for thurs 7/29 and patient then no showed to appointment. Did not want to schedule next week's ultrasounds at that time so called today via  to get them scheduled as she did not come to the appointment on 7/29.       Vilma Conrad  Clinical Services Assistant

## 2021-07-30 NOTE — TELEPHONE ENCOUNTER
----- Message from Martha Flores MD sent at 7/29/2021  2:37 PM CDT -----  I saw this pt on Tuesday-she is almost 35 weeks with pre-existing diabetes, hadn't been seen since 18 weeks.  She needs a growth ultrasound ASAP and twice weekly BPPs to start this week, weekly CEE.   I put this in the check out note-not sure if she didn't stop at the desk but all I see is a CEE next week.  Can someone help to get the ultrasound and BPPs scheduled?  Thanks, Dr. Flores

## 2021-08-02 ENCOUNTER — VIRTUAL VISIT (OUTPATIENT)
Dept: EDUCATION SERVICES | Facility: CLINIC | Age: 26
End: 2021-08-02
Payer: COMMERCIAL

## 2021-08-02 ENCOUNTER — ANCILLARY PROCEDURE (OUTPATIENT)
Dept: ULTRASOUND IMAGING | Facility: CLINIC | Age: 26
End: 2021-08-02
Attending: OBSTETRICS & GYNECOLOGY
Payer: COMMERCIAL

## 2021-08-02 DIAGNOSIS — O09.93 SUPERVISION OF HIGH RISK PREGNANCY IN THIRD TRIMESTER: ICD-10-CM

## 2021-08-02 DIAGNOSIS — O24.112 PREGNANCY COMPLICATED BY PRE-EXISTING TYPE 2 DIABETES IN SECOND TRIMESTER: Primary | ICD-10-CM

## 2021-08-02 PROCEDURE — 76819 FETAL BIOPHYS PROFIL W/O NST: CPT

## 2021-08-02 PROCEDURE — 98966 PH1 ASSMT&MGMT NQHP 5-10: CPT | Mod: 95

## 2021-08-02 PROCEDURE — 76819 FETAL BIOPHYS PROFIL W/O NST: CPT | Mod: 26 | Performed by: OBSTETRICS & GYNECOLOGY

## 2021-08-02 NOTE — PROGRESS NOTES
Diabetes and Pregnancy Follow-up  Type of Service: Telephone Visit    How would patient like to obtain AVS? Not needed    Subjective/Objective:    Armida Da Silva was called for a scheduled BG review via  (ID#[id number]). Last date of communication was: 7/26/2021.    Gestational diabetes is being managed with diet and activity    Taking diabetes medications: no    Estimated Date of Delivery: Sep 3, 2021    Blood Glucose/Ketone Log:    Date Ketones Fasting Post Breakfast Post Lunch Post Supper   7/27  78 127 118 106   7/28  76 118 108 122   7/29  81 125 123 105   7/30  78 130 112 118   7/31  80 118 108 121   8/1  82 112 106 113   8/2  78 102           Assessment:    Ketones: na.   Fasting blood glucoses: 100% in target.  After breakfast: 100% in target.  After lunch: 100% in target.  After dinner: 100% in target.    Plan/Response:  No changes in the patient's current treatment plan.  Follow-up in 1 week.    BRONWYN Mcmahon [facility]  Time Spent: 9:55 minutes    Any diabetes medication dose changes were made via the CDE Protocol and Collaborative Practice Agreement with the patient's OB/GYN provider. A copy of this encounter was shared with the provider.

## 2021-08-05 ENCOUNTER — ANCILLARY PROCEDURE (OUTPATIENT)
Dept: ULTRASOUND IMAGING | Facility: CLINIC | Age: 26
End: 2021-08-05
Attending: OBSTETRICS & GYNECOLOGY
Payer: COMMERCIAL

## 2021-08-05 DIAGNOSIS — O09.93 SUPERVISION OF HIGH RISK PREGNANCY IN THIRD TRIMESTER: Primary | ICD-10-CM

## 2021-08-05 DIAGNOSIS — O09.93 SUPERVISION OF HIGH RISK PREGNANCY IN THIRD TRIMESTER: ICD-10-CM

## 2021-08-05 PROCEDURE — 76819 FETAL BIOPHYS PROFIL W/O NST: CPT | Mod: 26 | Performed by: OBSTETRICS & GYNECOLOGY

## 2021-08-05 PROCEDURE — 76819 FETAL BIOPHYS PROFIL W/O NST: CPT

## 2021-08-09 ENCOUNTER — ANCILLARY PROCEDURE (OUTPATIENT)
Dept: ULTRASOUND IMAGING | Facility: CLINIC | Age: 26
End: 2021-08-09
Attending: OBSTETRICS & GYNECOLOGY
Payer: COMMERCIAL

## 2021-08-09 ENCOUNTER — VIRTUAL VISIT (OUTPATIENT)
Dept: EDUCATION SERVICES | Facility: CLINIC | Age: 26
End: 2021-08-09
Payer: COMMERCIAL

## 2021-08-09 DIAGNOSIS — K59.00 CONSTIPATION: ICD-10-CM

## 2021-08-09 DIAGNOSIS — O24.112 PREGNANCY COMPLICATED BY PRE-EXISTING TYPE 2 DIABETES IN SECOND TRIMESTER: Primary | ICD-10-CM

## 2021-08-09 DIAGNOSIS — O24.113 PRE-EXISTING TYPE 2 DIABETES MELLITUS DURING PREGNANCY IN THIRD TRIMESTER: ICD-10-CM

## 2021-08-09 PROCEDURE — 98966 PH1 ASSMT&MGMT NQHP 5-10: CPT | Mod: 95 | Performed by: DIETITIAN, REGISTERED

## 2021-08-09 PROCEDURE — 76819 FETAL BIOPHYS PROFIL W/O NST: CPT | Mod: 26 | Performed by: OBSTETRICS & GYNECOLOGY

## 2021-08-09 PROCEDURE — 76819 FETAL BIOPHYS PROFIL W/O NST: CPT

## 2021-08-09 RX ORDER — POLYETHYLENE GLYCOL 3350 17 G/17G
17 POWDER, FOR SOLUTION ORAL DAILY PRN
Qty: 510 G | Refills: 1 | Status: SHIPPED | OUTPATIENT
Start: 2021-08-09 | End: 2022-01-27

## 2021-08-09 NOTE — PROGRESS NOTES
Diabetes and Pregnancy Follow-up  Type of Service: Telephone Visit    How would patient like to obtain AVS? Not needed    Subjective/Objective:    Armida Da Silva was called for a scheduled BG review via  (ID#[id number]). Last date of communication was: 8/2/2021.    Gestational diabetes is being managed with diet and activity    Taking diabetes medications: no    Estimated Date of Delivery: Sep 3, 2021    Blood Glucose/Ketone Log:    Date Ketones Fasting Post Breakfast Post Lunch Post Supper   8/3  84 100 101 111   8/4  87 120 105 114   8/5  91 107 124 103   8/6  79 99 118 119   8/7  76 107 122 96   8/8  84 126 124 106   8/9  87 112           Assessment:    Ketones: not testing.   Fasting blood glucoses: 100% in target.  After breakfast: 100% in target.  After lunch: 100% in target.  After dinner: 100% in target.    Plan/Response:  No changes in the patient's current treatment plan.  Follow-up in 1 week.    [facility]hon [facility]  Time Spent: 10:06 minutes    Any diabetes medication dose changes were made via the CDE Protocol and Collaborative Practice Agreement with the patient's OB/GYN provider. A copy of this encounter was shared with the provider.

## 2021-08-10 NOTE — PROGRESS NOTES
"[facility]   Prenatal Care Visit  SUBJECTIVE   Armida Da Silva is a 26 year old  at 36w6d by 5w2d US, here for return OB visit.     She is feeling well.  Denies contractions, leakage of fluid, vaginal bleeding, headache, chest pain, shortness of breath, vision changes, LE swelling. GERD symptoms have improved with prevacid. Sleeping well.     Pregnancy notable for :   - T2DM on metformin  - Chronic HTN  - Obesity     OBJECTIVE   Blood Pressure 128/88   Pulse 94   Height 1.676 m (5' 6\")   Weight 109.3 kg (241 lb)   Last Menstrual Period 2020   Body Mass Index 38.90 kg/m      Gen: NAD  Fundal height: not measured  FHT: 155 bpm  Pelvic: normal external female genitalia, normal appearing physiologic discharge    Total weight gain: 0.68 kg (1 lb 8 oz)    ASSESSMENT & PLAN   26 year old  at 36w6d by 5w2d , CEE.     # Prenatal care  - Prenatal labs, Rh Pos, Mariia Neg, Hgb 13.7, HEP nl, Plt 419, Trep NR, UCx normal, Hep C neg, HIV neg, GC neg               - Vit D deficiency, recommend supplementation, she has this at home         - Immunizations - s/p flu 2020, Hep B nonimmune- s/p vaccines x2 (received second today, third due postpartum), s/p TDAP, COVID vaccine discussed last visit         - Genetic testing - declined GC referral   - GBS collected today    #Genetic screening &  diagnosis  - Level 2 US wnl, normal fetal echo  - Growth scans indicated q 4 weeks, last at 35w0d EFW 58%ile, AC 70%ile  - BPP indicated twice weekly due to T2DM, today ordered BPP for remainder of pregnancy    # Type 2 diabetes mellitus  Blood sugars from Visit with CDE  reviewed, are 100% in goal. Encouraged Armida to keep up the great work on glucose control. Reviewed that delivery is recommended by 39w0d for well-controlled T2DM, reviewed risks to baby of continuing pregnancy past 39w. Discussed that, If blood sugar control worsens, we would recommend   - On metformin 500mg daily, continue, as BGs within " goal  - Early A1c 5.6, repeat  5.2  - Continue to follow with Diabetes Education - next visit , patient aware  - Normal Fetal echo, BPP bi-weekly (as above)    # Chronic HTN  Well-controlled. Baseline HELLP labs wnl.   - continue ASA for preE ppx  - /growth US per above  - Baseline EKG showed right axis deviation. She was seen by Dr. Mayorga who recommended echo, this was not yet completed.  RN team to help facilitate appointment.  - Delivery at 38-39 weeks     #Birth and postpartum preferences  - Discussed when to present to triage, labor warning signs  - Discussed pain management options, patient undecided  - Plans breast feeding  - Post-partum contraception: discussed, patient undecided  - Will discuss IOL at 39w, pediatrician plans, pain control and birth control at next visit    RTC 1 week    [place] ipad  utilized for the whole visit.    Staffed with Dr. Sil Escalante MD MSc  OBGYN Resident, PGY3  2021, 4:19 PM    The Patient was seen in Resident Continuity Clinic by ANISH ESCALANTE.  I reviewed the history & exam. Assessment and plan were jointly made.    Lesa Mujica MD

## 2021-08-12 ENCOUNTER — OFFICE VISIT (OUTPATIENT)
Dept: OBGYN | Facility: CLINIC | Age: 26
End: 2021-08-12
Payer: COMMERCIAL

## 2021-08-12 ENCOUNTER — ANCILLARY PROCEDURE (OUTPATIENT)
Dept: ULTRASOUND IMAGING | Facility: CLINIC | Age: 26
End: 2021-08-12
Attending: ADVANCED PRACTICE MIDWIFE
Payer: COMMERCIAL

## 2021-08-12 VITALS
WEIGHT: 241 LBS | HEIGHT: 66 IN | SYSTOLIC BLOOD PRESSURE: 128 MMHG | HEART RATE: 94 BPM | BODY MASS INDEX: 38.73 KG/M2 | DIASTOLIC BLOOD PRESSURE: 88 MMHG

## 2021-08-12 DIAGNOSIS — O24.113 PRE-EXISTING TYPE 2 DIABETES MELLITUS DURING PREGNANCY IN THIRD TRIMESTER: ICD-10-CM

## 2021-08-12 DIAGNOSIS — R73.03 PRE-DIABETES: ICD-10-CM

## 2021-08-12 DIAGNOSIS — Z78.9 NOT IMMUNE TO HEPATITIS B VIRUS: ICD-10-CM

## 2021-08-12 DIAGNOSIS — E55.9 VITAMIN D DEFICIENCY: ICD-10-CM

## 2021-08-12 DIAGNOSIS — O09.93 SUPERVISION OF HIGH RISK PREGNANCY IN THIRD TRIMESTER: Primary | ICD-10-CM

## 2021-08-12 DIAGNOSIS — O10.919 HTN IN PREGNANCY, CHRONIC: ICD-10-CM

## 2021-08-12 PROCEDURE — 250N000011 HC RX IP 250 OP 636

## 2021-08-12 PROCEDURE — 76819 FETAL BIOPHYS PROFIL W/O NST: CPT

## 2021-08-12 PROCEDURE — 90746 HEPB VACCINE 3 DOSE ADULT IM: CPT

## 2021-08-12 PROCEDURE — G0010 ADMIN HEPATITIS B VACCINE: HCPCS

## 2021-08-12 PROCEDURE — 99207 PR PRENATAL VISIT: CPT | Mod: GE | Performed by: OBSTETRICS & GYNECOLOGY

## 2021-08-12 PROCEDURE — 87653 STREP B DNA AMP PROBE: CPT | Performed by: STUDENT IN AN ORGANIZED HEALTH CARE EDUCATION/TRAINING PROGRAM

## 2021-08-12 PROCEDURE — 76819 FETAL BIOPHYS PROFIL W/O NST: CPT | Mod: 26 | Performed by: OBSTETRICS & GYNECOLOGY

## 2021-08-12 PROCEDURE — G0463 HOSPITAL OUTPT CLINIC VISIT: HCPCS | Mod: 25 | Performed by: STUDENT IN AN ORGANIZED HEALTH CARE EDUCATION/TRAINING PROGRAM

## 2021-08-12 PROCEDURE — G0463 HOSPITAL OUTPT CLINIC VISIT: HCPCS | Performed by: STUDENT IN AN ORGANIZED HEALTH CARE EDUCATION/TRAINING PROGRAM

## 2021-08-12 ASSESSMENT — MIFFLIN-ST. JEOR: SCORE: 1859.92

## 2021-08-13 ENCOUNTER — TELEPHONE (OUTPATIENT)
Dept: OBGYN | Facility: CLINIC | Age: 26
End: 2021-08-13

## 2021-08-13 DIAGNOSIS — O09.93 SUPERVISION OF HIGH RISK PREGNANCY IN THIRD TRIMESTER: Primary | ICD-10-CM

## 2021-08-13 LAB
GP B STREP DNA SPEC QL NAA+PROBE: NEGATIVE
PATIENT PENICILLIN, AMOXICILLIN, CEPHALOSPORINS ALLERGY: NO

## 2021-08-13 NOTE — TELEPHONE ENCOUNTER
Pt needs to get scheduled for echocardiogram and did not have a phone number.  This writer gave [phone] for the pt to schedule with the assistance of an .

## 2021-08-14 NOTE — PROGRESS NOTES
"[facility]   Prenatal Care Visit  SUBJECTIVE   Armida Da Silva is a 26 year old  at 38w1d by 5w2d US, here for return OB visit.     Notes blood sugars have been in normal range, doesn't have log today.     She is feeling well.  Denies contractions, leakage of fluid, vaginal bleeding, headache, chest pain, shortness of breath, vision changes, LE swelling.     Pregnancy notable for :   - T2DM on metformin  - Chronic HTN  - Obesity     OBJECTIVE   /87   Pulse 89   Ht 1.676 m (5' 6\")   Wt 110.2 kg (243 lb)   LMP 2020   BMI 39.22 kg/m      Gen: NAD  Fundal height: not measured  FHT: 140 bpm   SVE: /-3    US BPP   US EGA = 35 5/7 weeks.  EFW = 2662 grams, 58 % for 35 weeks.    ASSESSMENT & PLAN   26 year old  at 38w1d by 5w2d US, CEE. IOL at 39 weeks, scheduled  at 0800.    # Prenatal care  - Prenatal labs, Rh Pos, Mariia Neg, Hgb 13.7, HEP nl, Plt 419, Trep NR, UCx normal, Hep C neg, HIV neg, GC neg               - Vit D deficiency, recommend supplementation, she has this at home         - Immunizations - s/p flu 2020, Hep B nonimmune- s/p vaccines x2 (received second today, third due postpartum), s/p TDAP, COVID vaccine discussed          - Genetic testing - declined GC referral   - GBS negative    #Genetic screening &  diagnosis  - Level 2 US wnl, normal fetal echo  - Growth scans indicated q 4 weeks, last at 35w0d EFW 58%ile, AC 70%ile   - BPP indicated twice weekly due to T2DM, next scheduled tomorrow    # Type 2 diabetes mellitus  Blood sugars from Visit with CDE  reviewed, are 100% in goal. Encouraged Armida to keep up the great work on glucose control. Reviewed that delivery is recommended by 39w0d for well-controlled T2DM, reviewed risks to baby of continuing pregnancy past 39w.   - On metformin 500mg daily, continue, as BGs within goal per pt report  - Early A1c 5.6, repeat  5.2  - Continue to follow with Diabetes Education   - Normal Fetal echo, BPP " bi-weekly (as above)    # Chronic HTN  Well-controlled. Baseline HELLP labs wnl.   - continue ASA for preE ppx  - /growth US per above  - Baseline EKG showed right axis deviation. She was seen by Dr. Mayorga who recommended echo, this was not yet completed.    - Delivery at 38-39 weeks   - BP today normal though mildly increased from baseline, asymptomatic    #Birth and postpartum preferences  - Plans breast feeding  - Post-partum contraception: patient undecided  - IOL scheduled  0800, instructions to L&D given     RTC 1 week    [employer] ipad  utilized for the whole visit.    Staffed with Dr. Feliciano Saavedra MD PGY3  Obstetrics & Gynecology  21     The Patient was seen in [facility] by    SIMA SAAVEDRA, .  I reviewed the history & exam. Assessment and plan were jointly made.    Roxana Wiggins MD

## 2021-08-16 ENCOUNTER — VIRTUAL VISIT (OUTPATIENT)
Dept: EDUCATION SERVICES | Facility: CLINIC | Age: 26
End: 2021-08-16
Payer: COMMERCIAL

## 2021-08-16 ENCOUNTER — ANCILLARY PROCEDURE (OUTPATIENT)
Dept: ULTRASOUND IMAGING | Facility: CLINIC | Age: 26
End: 2021-08-16
Attending: OBSTETRICS & GYNECOLOGY
Payer: COMMERCIAL

## 2021-08-16 DIAGNOSIS — O24.112 PREGNANCY COMPLICATED BY PRE-EXISTING TYPE 2 DIABETES IN SECOND TRIMESTER: Primary | ICD-10-CM

## 2021-08-16 DIAGNOSIS — O09.93 SUPERVISION OF HIGH RISK PREGNANCY IN THIRD TRIMESTER: ICD-10-CM

## 2021-08-16 PROCEDURE — 98966 PH1 ASSMT&MGMT NQHP 5-10: CPT | Mod: 95

## 2021-08-16 PROCEDURE — 76819 FETAL BIOPHYS PROFIL W/O NST: CPT | Mod: 26 | Performed by: OBSTETRICS & GYNECOLOGY

## 2021-08-16 PROCEDURE — 76819 FETAL BIOPHYS PROFIL W/O NST: CPT

## 2021-08-16 NOTE — LETTER
8/16/2021         RE: Armida Da Silva  [address]  [facility] [address]        Dear Colleague,    Thank you for referring your patient, Armida Da Silva, to the [facility]. Please see a copy of my visit note below.    Diabetes and Pregnancy Follow-up  Type of Service: Telephone Visit    How would patient like to obtain AVS? Not needed    Subjective/Objective:    Armida Da Silva was called for a scheduled BG review via . Last date of communication was: 8/9/2021.    Gestational diabetes is being managed with diet and activity    Taking diabetes medications: no    Estimated Date of Delivery: Sep 3, 2021    Blood Glucose/Ketone Log:    Date Ketones Fasting Post Breakfast Post Lunch Post Supper   8/10  78 110 120 109   8/11  96 107 104 106   8/12  89 107 118 102   8/13  75 106 126 97   8/14  74 99 105 100   8/15  80 117 113 106   8/16  75 102         Assessment:    Ketones: na.   Fasting blood glucoses: 85% in target.  After breakfast: 100% in target.  After lunch: 100% in target.  After dinner: 100% in target.    Plan/Response:  No changes in the patient's current treatment plan.  Follow-up on Monday.    BRONWYN Mcmahon CDCES  Time Spent: 6:29 minutes    Any diabetes medication dose changes were made via the CDE Protocol and Collaborative Practice Agreement with the patient's OB/GYN provider. A copy of this encounter was shared with the provider.

## 2021-08-16 NOTE — PROGRESS NOTES
Diabetes and Pregnancy Follow-up  Type of Service: Telephone Visit    How would patient like to obtain AVS? Not needed    Subjective/Objective:    Armida Da Silva was called for a scheduled BG review via . Last date of communication was: 8/9/2021.    Gestational diabetes is being managed with diet and activity    Taking diabetes medications: no    Estimated Date of Delivery: Sep 3, 2021    Blood Glucose/Ketone Log:    Date Ketones Fasting Post Breakfast Post Lunch Post Supper   8/10  78 110 120 109   8/11  96 107 104 106   8/12  89 107 118 102   8/13  75 106 126 97   8/14  74 99 105 100   8/15  80 117 113 106   8/16  75 102         Assessment:    Ketones: na.   Fasting blood glucoses: 85% in target.  After breakfast: 100% in target.  After lunch: 100% in target.  After dinner: 100% in target.    Plan/Response:  No changes in the patient's current treatment plan.  Follow-up on Monday.    BRONWYN Mcmahon [place]  Time Spent: 6:29 minutes    Any diabetes medication dose changes were made via the CDE Protocol and Collaborative Practice Agreement with the patient's OB/GYN provider. A copy of this encounter was shared with the provider.

## 2021-08-18 ENCOUNTER — OFFICE VISIT (OUTPATIENT)
Dept: OBGYN | Facility: CLINIC | Age: 26
End: 2021-08-18
Attending: ADVANCED PRACTICE MIDWIFE
Payer: COMMERCIAL

## 2021-08-18 VITALS
DIASTOLIC BLOOD PRESSURE: 87 MMHG | BODY MASS INDEX: 39.05 KG/M2 | HEART RATE: 89 BPM | HEIGHT: 66 IN | SYSTOLIC BLOOD PRESSURE: 134 MMHG | WEIGHT: 243 LBS

## 2021-08-18 DIAGNOSIS — O09.90 SUPERVISION OF HIGH RISK PREGNANCY, ANTEPARTUM: Primary | ICD-10-CM

## 2021-08-18 PROCEDURE — 99207 PR PRENATAL VISIT: CPT | Mod: GE | Performed by: OBSTETRICS & GYNECOLOGY

## 2021-08-18 PROCEDURE — G0463 HOSPITAL OUTPT CLINIC VISIT: HCPCS

## 2021-08-18 ASSESSMENT — MIFFLIN-ST. JEOR: SCORE: 1868.99

## 2021-08-18 NOTE — LETTER
"2021       RE: Armida Da Silva   [address]  [place] [facility] [address]     Dear Colleague,    Thank you for referring your patient, Armida Da Silva, to the [facility] [place] at [facility]. Please see a copy of my visit note below.    [facility]   Prenatal Care Visit  SUBJECTIVE   Armida Da Silva is a 26 year old  at 38w1d by 5w2d US, here for return OB visit.     Notes blood sugars have been in normal range, doesn't have log today.     She is feeling well.  Denies contractions, leakage of fluid, vaginal bleeding, headache, chest pain, shortness of breath, vision changes, LE swelling.     Pregnancy notable for :   - T2DM on metformin  - Chronic HTN  - Obesity     OBJECTIVE   /87   Pulse 89   Ht 1.676 m (5' 6\")   Wt 110.2 kg (243 lb)   LMP 2020   BMI 39.22 kg/m      Gen: NAD  Fundal height: not measured  FHT: 140 bpm   SVE: /3    US BPP   US EGA = 35 5/7 weeks.  EFW = 2662 grams, 58 % for 35 weeks.    ASSESSMENT & PLAN   26 year old  at 38w1d by 5w2d US, CEE. IOL at 39 weeks, scheduled  at 0800.    # Prenatal care  - Prenatal labs, Rh Pos, Mariia Neg, Hgb 13.7, HEP nl, Plt 419, Trep NR, UCx normal, Hep C neg, HIV neg, GC neg               - Vit D deficiency, recommend supplementation, she has this at home         - Immunizations - s/p flu 2020, Hep B nonimmune- s/p vaccines x2 (received second today, third due postpartum), s/p TDAP, COVID vaccine discussed          - Genetic testing - declined GC referral   - GBS negative    #Genetic screening &  diagnosis  - Level 2 US wnl, normal fetal echo  - Growth scans indicated q 4 weeks, last at 35w0d EFW 58%ile, AC 70%ile   - BPP indicated twice weekly due to T2DM, next scheduled tomorrow    # Type 2 diabetes mellitus  Blood sugars from Visit with CDE  reviewed, are 100% in goal. Encouraged Armida to keep up the great work on glucose control. " Reviewed that delivery is recommended by 39w0d for well-controlled T2DM, reviewed risks to baby of continuing pregnancy past 39w.   - On metformin 500mg daily, continue, as BGs within goal per pt report  - Early A1c 5.6, repeat  5.2  - Continue to follow with Diabetes Education   - Normal Fetal echo, BPP bi-weekly (as above)    # Chronic HTN  Well-controlled. Baseline HELLP labs wnl.   - continue ASA for preE ppx  - /growth US per above  - Baseline EKG showed right axis deviation. She was seen by Dr. Mayorga who recommended echo, this was not yet completed.    - Delivery at 38-39 weeks   - BP today normal though mildly increased from baseline, asymptomatic    #Birth and postpartum preferences  - Plans breast feeding  - Post-partum contraception: patient undecided  - IOL scheduled  0800, instructions to L&D given     RTC 1 week    [employer] ipad  utilized for the whole visit.    Staffed with Dr. Feliciano Saavedra MD PGY3  Obstetrics & Gynecology  21     The Patient was seen in [facility] by    SIMA SAAVEDRA, .  I reviewed the history & exam. Assessment and plan were jointly made.    Roxana Wiggins MD

## 2021-08-23 ENCOUNTER — VIRTUAL VISIT (OUTPATIENT)
Dept: EDUCATION SERVICES | Facility: CLINIC | Age: 26
End: 2021-08-23
Payer: COMMERCIAL

## 2021-08-23 DIAGNOSIS — O24.112 PREGNANCY COMPLICATED BY PRE-EXISTING TYPE 2 DIABETES IN SECOND TRIMESTER: Primary | ICD-10-CM

## 2021-08-23 PROCEDURE — 98966 PH1 ASSMT&MGMT NQHP 5-10: CPT | Mod: 95

## 2021-08-23 NOTE — PROGRESS NOTES
Diabetes and Pregnancy Follow-up  Type of Service: Telephone Visit    How would patient like to obtain AVS? Not needed    Subjective/Objective:    Armida Da Silva was called for a scheduled BG review. Last date of communication was: 8/16/2021.    Gestational diabetes is being managed with diet and activity    Taking diabetes medications: no    Estimated Date of Delivery: Sep 3, 2021    Blood Glucose/Ketone Log:    Date Ketones Fasting Post Breakfast Post Lunch Post Supper   8/17  73 105 110 114   8/18  77 121 107 115   8/19  81 109 114 116   8/20  76 123 95 102   8/21  71 105 100 123   8/22  72 108 112 105   8/23  79 110           Assessment:    Ketones: not testing.   Fasting blood glucoses: 100% in target.  After breakfast: 100% in target.  After lunch: 100% in target.  After dinner: 100% in target.    Plan/Response:  No changes in the patient's current treatment plan. Induction 8/27    BRONWYN Mcmahon CDCES  Time Spent: 6:03 minutes    Any diabetes medication dose changes were made via the CDE Protocol and Collaborative Practice Agreement with the patient's OB/GYN provider. A copy of this encounter was shared with the provider.

## 2021-08-26 ENCOUNTER — ANCILLARY PROCEDURE (OUTPATIENT)
Dept: ULTRASOUND IMAGING | Facility: CLINIC | Age: 26
End: 2021-08-26
Attending: OBSTETRICS & GYNECOLOGY
Payer: COMMERCIAL

## 2021-08-26 DIAGNOSIS — O09.93 SUPERVISION OF HIGH RISK PREGNANCY IN THIRD TRIMESTER: ICD-10-CM

## 2021-08-26 PROCEDURE — 76819 FETAL BIOPHYS PROFIL W/O NST: CPT

## 2021-08-26 PROCEDURE — 76819 FETAL BIOPHYS PROFIL W/O NST: CPT | Mod: 26 | Performed by: OBSTETRICS & GYNECOLOGY

## 2021-08-27 ENCOUNTER — HOSPITAL ENCOUNTER (INPATIENT)
Facility: CLINIC | Age: 26
LOS: 3 days | Discharge: HOME | End: 2021-08-30
Attending: OBSTETRICS & GYNECOLOGY | Admitting: OBSTETRICS & GYNECOLOGY
Payer: COMMERCIAL

## 2021-08-27 ENCOUNTER — HOSPITAL ENCOUNTER (OUTPATIENT)
Dept: OBGYN | Facility: CLINIC | Age: 26
End: 2021-08-27
Attending: OBSTETRICS & GYNECOLOGY
Payer: COMMERCIAL

## 2021-08-27 DIAGNOSIS — Z34.90 ENCOUNTER FOR INDUCTION OF LABOR: ICD-10-CM

## 2021-08-27 DIAGNOSIS — Z98.891 S/P CESAREAN SECTION: Primary | ICD-10-CM

## 2021-08-27 DIAGNOSIS — D62 ANEMIA DUE TO BLOOD LOSS, ACUTE: ICD-10-CM

## 2021-08-27 DIAGNOSIS — O09.91 SUPERVISION OF HIGH RISK PREGNANCY IN FIRST TRIMESTER: ICD-10-CM

## 2021-08-27 LAB
ABO/RH(D): NORMAL
ANTIBODY SCREEN: NEGATIVE
BASOPHILS # BLD AUTO: 0 10E3/UL (ref 0–0.2)
BASOPHILS NFR BLD AUTO: 0 %
EOSINOPHIL # BLD AUTO: 0 10E3/UL (ref 0–0.7)
EOSINOPHIL NFR BLD AUTO: 1 %
ERYTHROCYTE [DISTWIDTH] IN BLOOD BY AUTOMATED COUNT: 13.6 % (ref 10–15)
GLUCOSE BLDC GLUCOMTR-MCNC: 106 MG/DL (ref 70–99)
GLUCOSE BLDC GLUCOMTR-MCNC: 110 MG/DL (ref 70–99)
GLUCOSE BLDC GLUCOMTR-MCNC: 116 MG/DL (ref 70–99)
GLUCOSE BLDC GLUCOMTR-MCNC: 55 MG/DL (ref 70–99)
GLUCOSE BLDC GLUCOMTR-MCNC: 75 MG/DL (ref 70–99)
GLUCOSE BLDC GLUCOMTR-MCNC: 77 MG/DL (ref 70–99)
GLUCOSE BLDC GLUCOMTR-MCNC: 77 MG/DL (ref 70–99)
GLUCOSE BLDC GLUCOMTR-MCNC: 90 MG/DL (ref 70–99)
HCT VFR BLD AUTO: 37 % (ref 35–47)
HGB BLD-MCNC: 12.7 G/DL (ref 11.7–15.7)
IMM GRANULOCYTES # BLD: 0 10E3/UL
IMM GRANULOCYTES NFR BLD: 0 %
LYMPHOCYTES # BLD AUTO: 2.8 10E3/UL (ref 0.8–5.3)
LYMPHOCYTES NFR BLD AUTO: 37 %
MCH RBC QN AUTO: 28.6 PG (ref 26.5–33)
MCHC RBC AUTO-ENTMCNC: 34.3 G/DL (ref 31.5–36.5)
MCV RBC AUTO: 83 FL (ref 78–100)
MONOCYTES # BLD AUTO: 0.6 10E3/UL (ref 0–1.3)
MONOCYTES NFR BLD AUTO: 8 %
NEUTROPHILS # BLD AUTO: 4.1 10E3/UL (ref 1.6–8.3)
NEUTROPHILS NFR BLD AUTO: 54 %
NRBC # BLD AUTO: 0 10E3/UL
NRBC BLD AUTO-RTO: 0 /100
PLATELET # BLD AUTO: 273 10E3/UL (ref 150–450)
RBC # BLD AUTO: 4.44 10E6/UL (ref 3.8–5.2)
SARS-COV-2 RNA RESP QL NAA+PROBE: NEGATIVE
SPECIMEN EXPIRATION DATE: NORMAL
T PALLIDUM AB SER QL: NONREACTIVE
WBC # BLD AUTO: 7.6 10E3/UL (ref 4–11)

## 2021-08-27 PROCEDURE — 120N000002 HC R&B MED SURG/OB UMMC

## 2021-08-27 PROCEDURE — 250N000011 HC RX IP 250 OP 636: Performed by: STUDENT IN AN ORGANIZED HEALTH CARE EDUCATION/TRAINING PROGRAM

## 2021-08-27 PROCEDURE — 85025 COMPLETE CBC W/AUTO DIFF WBC: CPT | Performed by: STUDENT IN AN ORGANIZED HEALTH CARE EDUCATION/TRAINING PROGRAM

## 2021-08-27 PROCEDURE — 3E0P7VZ INTRODUCTION OF HORMONE INTO FEMALE REPRODUCTIVE, VIA NATURAL OR ARTIFICIAL OPENING: ICD-10-PCS | Performed by: OBSTETRICS & GYNECOLOGY

## 2021-08-27 PROCEDURE — 86901 BLOOD TYPING SEROLOGIC RH(D): CPT | Performed by: STUDENT IN AN ORGANIZED HEALTH CARE EDUCATION/TRAINING PROGRAM

## 2021-08-27 PROCEDURE — 86780 TREPONEMA PALLIDUM: CPT | Performed by: STUDENT IN AN ORGANIZED HEALTH CARE EDUCATION/TRAINING PROGRAM

## 2021-08-27 PROCEDURE — 87635 SARS-COV-2 COVID-19 AMP PRB: CPT | Performed by: STUDENT IN AN ORGANIZED HEALTH CARE EDUCATION/TRAINING PROGRAM

## 2021-08-27 PROCEDURE — 250N000013 HC RX MED GY IP 250 OP 250 PS 637: Performed by: STUDENT IN AN ORGANIZED HEALTH CARE EDUCATION/TRAINING PROGRAM

## 2021-08-27 RX ORDER — NALOXONE HYDROCHLORIDE 0.4 MG/ML
0.2 INJECTION, SOLUTION INTRAMUSCULAR; INTRAVENOUS; SUBCUTANEOUS
Status: DISCONTINUED | OUTPATIENT
Start: 2021-08-27 | End: 2021-08-28 | Stop reason: HOSPADM

## 2021-08-27 RX ORDER — FENTANYL CITRATE 50 UG/ML
100 INJECTION, SOLUTION INTRAMUSCULAR; INTRAVENOUS ONCE
Status: DISCONTINUED | OUTPATIENT
Start: 2021-08-27 | End: 2021-08-29

## 2021-08-27 RX ORDER — METOCLOPRAMIDE 10 MG/1
10 TABLET ORAL EVERY 6 HOURS PRN
Status: DISCONTINUED | OUTPATIENT
Start: 2021-08-27 | End: 2021-08-28 | Stop reason: HOSPADM

## 2021-08-27 RX ORDER — MORPHINE SULFATE 10 MG/ML
10 INJECTION, SOLUTION INTRAMUSCULAR; INTRAVENOUS
Status: DISCONTINUED | OUTPATIENT
Start: 2021-08-27 | End: 2021-08-28 | Stop reason: HOSPADM

## 2021-08-27 RX ORDER — MISOPROSTOL 100 UG/1
25 TABLET ORAL EVERY 4 HOURS PRN
Status: DISCONTINUED | OUTPATIENT
Start: 2021-08-27 | End: 2021-08-28 | Stop reason: HOSPADM

## 2021-08-27 RX ORDER — METOCLOPRAMIDE HYDROCHLORIDE 5 MG/ML
10 INJECTION INTRAMUSCULAR; INTRAVENOUS EVERY 6 HOURS PRN
Status: DISCONTINUED | OUTPATIENT
Start: 2021-08-27 | End: 2021-08-28 | Stop reason: HOSPADM

## 2021-08-27 RX ORDER — OXYTOCIN/0.9 % SODIUM CHLORIDE 30/500 ML
PLASTIC BAG, INJECTION (ML) INTRAVENOUS
Status: DISCONTINUED
Start: 2021-08-27 | End: 2021-08-28 | Stop reason: HOSPADM

## 2021-08-27 RX ORDER — OXYTOCIN 10 [USP'U]/ML
10 INJECTION, SOLUTION INTRAMUSCULAR; INTRAVENOUS
Status: DISCONTINUED | OUTPATIENT
Start: 2021-08-27 | End: 2021-08-29

## 2021-08-27 RX ORDER — NICOTINE POLACRILEX 4 MG
15-30 LOZENGE BUCCAL
Status: DISCONTINUED | OUTPATIENT
Start: 2021-08-27 | End: 2021-08-28

## 2021-08-27 RX ORDER — IBUPROFEN 600 MG/1
600 TABLET, FILM COATED ORAL
Status: DISCONTINUED | OUTPATIENT
Start: 2021-08-27 | End: 2021-08-29

## 2021-08-27 RX ORDER — PROCHLORPERAZINE MALEATE 10 MG
10 TABLET ORAL EVERY 6 HOURS PRN
Status: DISCONTINUED | OUTPATIENT
Start: 2021-08-27 | End: 2021-08-28 | Stop reason: HOSPADM

## 2021-08-27 RX ORDER — DEXTROSE MONOHYDRATE 25 G/50ML
25-50 INJECTION, SOLUTION INTRAVENOUS
Status: DISCONTINUED | OUTPATIENT
Start: 2021-08-27 | End: 2021-08-28

## 2021-08-27 RX ORDER — SODIUM CHLORIDE, SODIUM LACTATE, POTASSIUM CHLORIDE, CALCIUM CHLORIDE 600; 310; 30; 20 MG/100ML; MG/100ML; MG/100ML; MG/100ML
INJECTION, SOLUTION INTRAVENOUS
Status: COMPLETED
Start: 2021-08-27 | End: 2021-08-28

## 2021-08-27 RX ORDER — ACETAMINOPHEN 325 MG/1
650 TABLET ORAL EVERY 4 HOURS PRN
Status: DISCONTINUED | OUTPATIENT
Start: 2021-08-27 | End: 2021-08-28 | Stop reason: HOSPADM

## 2021-08-27 RX ORDER — CARBOPROST TROMETHAMINE 250 UG/ML
250 INJECTION, SOLUTION INTRAMUSCULAR
Status: DISCONTINUED | OUTPATIENT
Start: 2021-08-27 | End: 2021-08-28 | Stop reason: HOSPADM

## 2021-08-27 RX ORDER — MISOPROSTOL 200 UG/1
400 TABLET ORAL
Status: DISCONTINUED | OUTPATIENT
Start: 2021-08-27 | End: 2021-08-28 | Stop reason: HOSPADM

## 2021-08-27 RX ORDER — MISOPROSTOL 200 UG/1
800 TABLET ORAL
Status: DISCONTINUED | OUTPATIENT
Start: 2021-08-27 | End: 2021-08-28 | Stop reason: HOSPADM

## 2021-08-27 RX ORDER — NALOXONE HYDROCHLORIDE 0.4 MG/ML
0.4 INJECTION, SOLUTION INTRAMUSCULAR; INTRAVENOUS; SUBCUTANEOUS
Status: DISCONTINUED | OUTPATIENT
Start: 2021-08-27 | End: 2021-08-28 | Stop reason: HOSPADM

## 2021-08-27 RX ORDER — TRANEXAMIC ACID 10 MG/ML
1 INJECTION, SOLUTION INTRAVENOUS EVERY 30 MIN PRN
Status: DISCONTINUED | OUTPATIENT
Start: 2021-08-27 | End: 2021-08-28 | Stop reason: HOSPADM

## 2021-08-27 RX ORDER — OXYTOCIN/0.9 % SODIUM CHLORIDE 30/500 ML
340 PLASTIC BAG, INJECTION (ML) INTRAVENOUS CONTINUOUS PRN
Status: DISCONTINUED | OUTPATIENT
Start: 2021-08-27 | End: 2021-08-28 | Stop reason: HOSPADM

## 2021-08-27 RX ORDER — OXYTOCIN 10 [USP'U]/ML
INJECTION, SOLUTION INTRAMUSCULAR; INTRAVENOUS
Status: DISCONTINUED
Start: 2021-08-27 | End: 2021-08-28 | Stop reason: HOSPADM

## 2021-08-27 RX ORDER — LIDOCAINE HYDROCHLORIDE 10 MG/ML
INJECTION, SOLUTION EPIDURAL; INFILTRATION; INTRACAUDAL; PERINEURAL
Status: DISCONTINUED
Start: 2021-08-27 | End: 2021-08-28 | Stop reason: HOSPADM

## 2021-08-27 RX ORDER — KETOROLAC TROMETHAMINE 30 MG/ML
30 INJECTION, SOLUTION INTRAMUSCULAR; INTRAVENOUS
Status: DISCONTINUED | OUTPATIENT
Start: 2021-08-27 | End: 2021-08-29

## 2021-08-27 RX ORDER — OXYTOCIN 10 [USP'U]/ML
10 INJECTION, SOLUTION INTRAMUSCULAR; INTRAVENOUS
Status: DISCONTINUED | OUTPATIENT
Start: 2021-08-27 | End: 2021-08-28 | Stop reason: HOSPADM

## 2021-08-27 RX ORDER — MISOPROSTOL 200 UG/1
TABLET ORAL
Status: DISCONTINUED
Start: 2021-08-27 | End: 2021-08-28 | Stop reason: HOSPADM

## 2021-08-27 RX ORDER — CITRIC ACID/SODIUM CITRATE 334-500MG
30 SOLUTION, ORAL ORAL ONCE
Status: COMPLETED | OUTPATIENT
Start: 2021-08-27 | End: 2021-08-28

## 2021-08-27 RX ORDER — FENTANYL CITRATE 50 UG/ML
50-100 INJECTION, SOLUTION INTRAMUSCULAR; INTRAVENOUS
Status: DISCONTINUED | OUTPATIENT
Start: 2021-08-27 | End: 2021-08-28 | Stop reason: HOSPADM

## 2021-08-27 RX ORDER — ONDANSETRON 2 MG/ML
4 INJECTION INTRAMUSCULAR; INTRAVENOUS EVERY 6 HOURS PRN
Status: DISCONTINUED | OUTPATIENT
Start: 2021-08-27 | End: 2021-08-28 | Stop reason: HOSPADM

## 2021-08-27 RX ORDER — HYDROXYZINE HYDROCHLORIDE 25 MG/1
50 TABLET, FILM COATED ORAL
Status: DISCONTINUED | OUTPATIENT
Start: 2021-08-27 | End: 2021-08-28 | Stop reason: HOSPADM

## 2021-08-27 RX ORDER — PROCHLORPERAZINE 25 MG
25 SUPPOSITORY, RECTAL RECTAL EVERY 12 HOURS PRN
Status: DISCONTINUED | OUTPATIENT
Start: 2021-08-27 | End: 2021-08-28 | Stop reason: HOSPADM

## 2021-08-27 RX ORDER — METHYLERGONOVINE MALEATE 0.2 MG/ML
200 INJECTION INTRAVENOUS
Status: DISCONTINUED | OUTPATIENT
Start: 2021-08-27 | End: 2021-08-28 | Stop reason: HOSPADM

## 2021-08-27 RX ORDER — SODIUM CHLORIDE 9 MG/ML
INJECTION, SOLUTION INTRAVENOUS CONTINUOUS
Status: DISCONTINUED | OUTPATIENT
Start: 2021-08-27 | End: 2021-08-28 | Stop reason: HOSPADM

## 2021-08-27 RX ORDER — OXYTOCIN/0.9 % SODIUM CHLORIDE 30/500 ML
100-340 PLASTIC BAG, INJECTION (ML) INTRAVENOUS CONTINUOUS PRN
Status: DISCONTINUED | OUTPATIENT
Start: 2021-08-27 | End: 2021-08-29

## 2021-08-27 RX ORDER — ONDANSETRON 4 MG/1
4 TABLET, ORALLY DISINTEGRATING ORAL EVERY 6 HOURS PRN
Status: DISCONTINUED | OUTPATIENT
Start: 2021-08-27 | End: 2021-08-28 | Stop reason: HOSPADM

## 2021-08-27 RX ADMIN — FENTANYL CITRATE 100 MCG: 50 INJECTION, SOLUTION INTRAMUSCULAR; INTRAVENOUS at 16:28

## 2021-08-27 RX ADMIN — Medication 25 MCG: at 09:25

## 2021-08-27 ASSESSMENT — MIFFLIN-ST. JEOR: SCORE: 1868.99

## 2021-08-27 ASSESSMENT — ACTIVITIES OF DAILY LIVING (ADL)
FALL_HISTORY_WITHIN_LAST_SIX_MONTHS: NO
TOILETING_ISSUES: NO

## 2021-08-27 NOTE — PROGRESS NOTES
"OBGYN Attending Labor Progress Note    S: Doing well, no concerns.    O:  BP (!) 145/89   Ht 1.676 m (5' 6\")   Wt 110.2 kg (243 lb)   LMP 2020   BMI 39.22 kg/m    Gen: NAD  SVE: 1cm/80%/-3/soft/posterior  FHT: 140 baseline/moderate variability/+accels/no decels  [place]: quiet    A/P:   Armida Da Silva is a 26 year old  at 39w0d admitted for induction of labor 2/2 cHTN and T2DM.    -tavares balloon placement attempted, unsuccessful. Will continue with misoprostol induction, repeat examination PRN.    Madalyn Rutledge MD, MSCI    [facility]/OBGYN  "

## 2021-08-27 NOTE — PROGRESS NOTES
"OBGYN Labor Progress Note    S: Doing well, no concerns. Discussed tavares placement and agrees to proceed.     Nauruan speaking  present     O:  Patient Vitals for the past 24 hrs:   BP Temp Temp src Pulse Resp Height Weight   21 1831 (!) 142/81 97.8  F (36.6  C) Oral -- 16 -- --   21 1323 136/83 98.2  F (36.8  C) Oral 84 16 -- --   21 0850 (!) 145/89 -- -- -- -- 1.676 m (5' 6\") 110.2 kg (243 lb)      Gen: NAD  SVE: 1cm/50%/-4/soft/posterior, deviated to the patient's left  FHT: 140 baseline/moderate variability/+accels/no decels  [place]: q2-4 min    A/P:   Armida Da Silva is a 26 year old  at 39w0d admitted for induction of labor 2/2 cHTN and T2DM.     -tavares balloon placed without issue, patient tolerated it well. contoinue cervical ripening.   -FHT cat 1, reactive and reassuring     Fela Ayala MD - PGY3  21 1730     "

## 2021-08-27 NOTE — PLAN OF CARE
Patient's labor is progressing well. Received one dose of vaginal miso. Azul balloon was inserted. Pt is feeling and describes contractions painful. Vital signs are blood glucose are stable. FRH and uterine activity see flow sheet.

## 2021-08-27 NOTE — H&P
History and Physical     Armida Da Silva MRN# [id number]   YOB: 1995 Age: 26 year old      Date of Admission: 21     Primary care provider: No Ref-Primary, Physician      Assessment and Plan:     Armida Da Silva is a 26 year old  at 39w0d by 5w2d US, here for induction of labor for chronic hypertension and Type II DM. Pregnancy is notable for T2DM on metformin, chronic HTN, and obesity.      Type 2 diabetes mellitus  - On metformin 500mg daily, continue, as BGs within goal per pt report  - Plan for mSSI, QID BG in latent labor, possible insulin gtt in active labor.   - Early A1c 5.6, repeat  5.2  - Normal Fetal echo, BPP bi-weekly      Chronic HTN  - Well-controlled. Baseline HELLP labs wnl.   - Baseline EKG showed right axis deviation. She was seen by Dr. Mayorga who recommended echo, this was not yet completed.      Fetal Well-Being  Cat I FHT tracing, reactive; cephalic by BSUS; EFW 7lb  - Continuous Fetal Monitoring  - Intrauterine resuscitative measures prn    Routine Prenatal Care   - Prenatal labs, Rh Pos, Mariia Neg, Hgb 13.7, HEP nl, Plt 419, Trep NR, UCx normal, Hep C neg, HIV neg, GC neg  - Immunizations - s/p flu 2020, Hep B nonimmune- s/p vaccines x2 (received second today, third due postpartum), s/p TDAP, COVID vaccine discussed   - Genetic testing - declined GC referral  - GBS negative     Genetic screening &  diagnosis  - Level 2 US wnl, normal fetal echo  - Growth US: 35w0d EFW 58%ile, AC 70%ile     Patient discussed with Dr. Rutledge.           HPI:     Armida Da Silva is a 26 year old  at 38w6d by 5w2d US who presents today for an induction of labor in the setting of chronic hypertension. Pregnancy complicated by below listed concerns.     Patient feels well this morning. She reports good fetal movement. Denies LOF, vaginal bleeding. Occasional contractions, not particularly painful.  She denies fever, chills, SOB, chest pain, palpitations, N/V, LE  swelling/tenderness.  No concerns for headache, vision changes, RUQ or epigastric pain.      Pregnancy notable for :   - T2DM on metformin  - Chronic HTN  - Obesity     OB History:    OB History    Para Term  AB Living   1 0 0 0 0 0   SAB TAB Ectopic Multiple Live Births   0 0 0 0 0      # Outcome Date GA Lbr Reese/2nd Weight Sex Delivery Anes PTL Lv   1 Current                 Prenatal Lab Results:  Lab Results   Component Value Date    ABO O 2021    RH Pos 2021    AS Neg 2021    HEPBANG Nonreactive 2021    CHPCRT Negative 2021    GCPCRT Negative 2021    HGB 12.7 2021       GBS Status:   No results found for: GBS             Past Medical History:     Past Medical History:   Diagnosis Date     Obesity, BMI 38      PCOS (polycystic ovarian syndrome)      Pre-diabetes           Past Surgical History:     Past Surgical History:   Procedure Laterality Date     NO HISTORY OF SURGERY               Social History:     Social History     Tobacco Use     Smoking status: Never Smoker     Smokeless tobacco: Never Used   Substance Use Topics     Alcohol use: Never             Family History:     Family History   Problem Relation Age of Onset     Diabetes No family hx of      Hyperlipidemia No family hx of      Hypertension No family hx of      Cerebrovascular Disease No family hx of      Coronary Artery Disease No family hx of      Depression No family hx of      Anxiety Disorder No family hx of              Immunizations:     Immunization History   Administered Date(s) Administered     HepB-Adult 2021, 2021     Influenza Vaccine IM > 6 months Valent IIV4 2020     Tdap (Adacel,Boostrix) 2021            Allergies:   No Known Allergies          Medications:   (Not in a hospital admission)         Review of Systems & Physical Exam:     The Review of Systems is negative other than noted in the HPI      LMP 2020   Gen: Well appearing, no  distress  CV: RRR, nl S1/S2, no murmurs/clicks/gallops  Lungs: CTAB, non-labored breathing  Abd: Gravid, non-tender, non-distended  Ext: Trace peripheral extremity edema    Cervix: 1/50/-3  Membranes: Intact   Presentation: Cephalic by BSUS  Estimated Fetal Weight: 7lb    FHT:  Monitoring External  FHT: Baseline 140 bpm; moderate variability; positive accels present; no decelerations  TOCO 1 contractions in 10 minutes         Data:     Recent Results (from the past 1008 hour(s))   CBC with Platelets    Collection Time: 07/28/21  9:42 AM   Result Value Ref Range    WBC Count 8.3 4.0 - 11.0 10e3/uL    RBC Count 4.54 3.80 - 5.20 10e6/uL    Hemoglobin 12.7 11.7 - 15.7 g/dL    Hematocrit 38.7 35.0 - 47.0 %    MCV 85 78 - 100 fL    MCH 28.0 26.5 - 33.0 pg    MCHC 32.8 31.5 - 36.5 g/dL    RDW 13.9 10.0 - 15.0 %    Platelet Count 305 150 - 450 10e3/uL   25- OH-Vitamin D    Collection Time: 07/28/21  9:42 AM   Result Value Ref Range    Vitamin D, Total (25-Hydroxy) 79 (H) 20 - 75 ug/L   Hgb A1c    Collection Time: 07/28/21  9:42 AM   Result Value Ref Range    Hemoglobin A1C 5.2 0.0 - 5.6 %   Group B strep PCR    Collection Time: 08/12/21  3:59 PM    Specimen: Rectovaginal; Swab   Result Value Ref Range    Group B Strep PCR Negative Negative    Penicillin, amoxicillin, or cephalosporin allergy? No      Zahira Love MD  OB/GYN PGY-2  08/27/21 8:21 AM

## 2021-08-28 ENCOUNTER — ANESTHESIA (OUTPATIENT)
Dept: OBGYN | Facility: CLINIC | Age: 26
End: 2021-08-28
Payer: COMMERCIAL

## 2021-08-28 ENCOUNTER — ANESTHESIA EVENT (OUTPATIENT)
Dept: OBGYN | Facility: CLINIC | Age: 26
End: 2021-08-28
Payer: COMMERCIAL

## 2021-08-28 ENCOUNTER — ANCILLARY PROCEDURE (OUTPATIENT)
Dept: ULTRASOUND IMAGING | Facility: CLINIC | Age: 26
End: 2021-08-28
Attending: STUDENT IN AN ORGANIZED HEALTH CARE EDUCATION/TRAINING PROGRAM
Payer: COMMERCIAL

## 2021-08-28 LAB
GLUCOSE BLDC GLUCOMTR-MCNC: 107 MG/DL (ref 70–99)
GLUCOSE BLDC GLUCOMTR-MCNC: 112 MG/DL (ref 70–99)
GLUCOSE BLDC GLUCOMTR-MCNC: 112 MG/DL (ref 70–99)
GLUCOSE BLDC GLUCOMTR-MCNC: 116 MG/DL (ref 70–99)
GLUCOSE BLDC GLUCOMTR-MCNC: 128 MG/DL (ref 70–99)
GLUCOSE BLDC GLUCOMTR-MCNC: 79 MG/DL (ref 70–99)
GLUCOSE BLDC GLUCOMTR-MCNC: 87 MG/DL (ref 70–99)
GLUCOSE BLDC GLUCOMTR-MCNC: 88 MG/DL (ref 70–99)

## 2021-08-28 PROCEDURE — 250N000011 HC RX IP 250 OP 636: Performed by: STUDENT IN AN ORGANIZED HEALTH CARE EDUCATION/TRAINING PROGRAM

## 2021-08-28 PROCEDURE — 999N000016 HC STATISTIC ATTENDANCE AT DELIVERY

## 2021-08-28 PROCEDURE — 250N000009 HC RX 250: Performed by: STUDENT IN AN ORGANIZED HEALTH CARE EDUCATION/TRAINING PROGRAM

## 2021-08-28 PROCEDURE — 271N000001 HC OR GENERAL SUPPLY NON-STERILE: Performed by: OBSTETRICS & GYNECOLOGY

## 2021-08-28 PROCEDURE — 250N000013 HC RX MED GY IP 250 OP 250 PS 637: Performed by: STUDENT IN AN ORGANIZED HEALTH CARE EDUCATION/TRAINING PROGRAM

## 2021-08-28 PROCEDURE — 250N000011 HC RX IP 250 OP 636: Performed by: NURSE ANESTHETIST, CERTIFIED REGISTERED

## 2021-08-28 PROCEDURE — C9290 INJ, BUPIVACAINE LIPOSOME: HCPCS | Performed by: STUDENT IN AN ORGANIZED HEALTH CARE EDUCATION/TRAINING PROGRAM

## 2021-08-28 PROCEDURE — 120N000002 HC R&B MED SURG/OB UMMC

## 2021-08-28 PROCEDURE — 370N000017 HC ANESTHESIA TECHNICAL FEE, PER MIN: Performed by: OBSTETRICS & GYNECOLOGY

## 2021-08-28 PROCEDURE — 999N000141 HC STATISTIC PRE-PROCEDURE NURSING ASSESSMENT: Performed by: OBSTETRICS & GYNECOLOGY

## 2021-08-28 PROCEDURE — 258N000003 HC RX IP 258 OP 636: Performed by: NURSE ANESTHETIST, CERTIFIED REGISTERED

## 2021-08-28 PROCEDURE — 272N000001 HC OR GENERAL SUPPLY STERILE: Performed by: OBSTETRICS & GYNECOLOGY

## 2021-08-28 PROCEDURE — 360N000076 HC SURGERY LEVEL 3, PER MIN: Performed by: OBSTETRICS & GYNECOLOGY

## 2021-08-28 PROCEDURE — 99222 1ST HOSP IP/OBS MODERATE 55: CPT | Performed by: NURSE PRACTITIONER

## 2021-08-28 PROCEDURE — 250N000009 HC RX 250: Performed by: NURSE ANESTHETIST, CERTIFIED REGISTERED

## 2021-08-28 PROCEDURE — 258N000003 HC RX IP 258 OP 636

## 2021-08-28 PROCEDURE — 258N000003 HC RX IP 258 OP 636: Performed by: STUDENT IN AN ORGANIZED HEALTH CARE EDUCATION/TRAINING PROGRAM

## 2021-08-28 RX ORDER — FENTANYL CITRATE-0.9 % NACL/PF 10 MCG/ML
100 PLASTIC BAG, INJECTION (ML) INTRAVENOUS EVERY 5 MIN PRN
Status: DISCONTINUED | OUTPATIENT
Start: 2021-08-28 | End: 2021-08-28 | Stop reason: HOSPADM

## 2021-08-28 RX ORDER — MORPHINE SULFATE 1 MG/ML
INJECTION, SOLUTION EPIDURAL; INTRATHECAL; INTRAVENOUS PRN
Status: DISCONTINUED | OUTPATIENT
Start: 2021-08-28 | End: 2021-08-28

## 2021-08-28 RX ORDER — NALOXONE HYDROCHLORIDE 0.4 MG/ML
0.4 INJECTION, SOLUTION INTRAMUSCULAR; INTRAVENOUS; SUBCUTANEOUS
Status: DISCONTINUED | OUTPATIENT
Start: 2021-08-28 | End: 2021-08-28

## 2021-08-28 RX ORDER — FENTANYL/BUPIVACAINE/NS/PF 2-1250MCG
PLASTIC BAG, INJECTION (ML) INJECTION
Status: DISCONTINUED
Start: 2021-08-28 | End: 2021-08-28 | Stop reason: HOSPADM

## 2021-08-28 RX ORDER — ACETAMINOPHEN 325 MG/1
975 TABLET ORAL ONCE
Status: DISCONTINUED | OUTPATIENT
Start: 2021-08-28 | End: 2021-08-28 | Stop reason: HOSPADM

## 2021-08-28 RX ORDER — MEPERIDINE HYDROCHLORIDE 25 MG/ML
12.5 INJECTION INTRAMUSCULAR; INTRAVENOUS; SUBCUTANEOUS
Status: DISCONTINUED | OUTPATIENT
Start: 2021-08-28 | End: 2021-08-29

## 2021-08-28 RX ORDER — OXYTOCIN/0.9 % SODIUM CHLORIDE 30/500 ML
340 PLASTIC BAG, INJECTION (ML) INTRAVENOUS CONTINUOUS PRN
Status: DISCONTINUED | OUTPATIENT
Start: 2021-08-28 | End: 2021-08-30 | Stop reason: HOSPADM

## 2021-08-28 RX ORDER — OXYTOCIN/0.9 % SODIUM CHLORIDE 30/500 ML
100-340 PLASTIC BAG, INJECTION (ML) INTRAVENOUS CONTINUOUS PRN
Status: DISCONTINUED | OUTPATIENT
Start: 2021-08-28 | End: 2021-08-30 | Stop reason: HOSPADM

## 2021-08-28 RX ORDER — NALBUPHINE HYDROCHLORIDE 10 MG/ML
2.5-5 INJECTION, SOLUTION INTRAMUSCULAR; INTRAVENOUS; SUBCUTANEOUS EVERY 6 HOURS PRN
Status: DISCONTINUED | OUTPATIENT
Start: 2021-08-28 | End: 2021-08-29

## 2021-08-28 RX ORDER — CARBOPROST TROMETHAMINE 250 UG/ML
250 INJECTION, SOLUTION INTRAMUSCULAR
Status: DISCONTINUED | OUTPATIENT
Start: 2021-08-28 | End: 2021-08-30 | Stop reason: HOSPADM

## 2021-08-28 RX ORDER — IBUPROFEN 800 MG/1
800 TABLET, FILM COATED ORAL EVERY 6 HOURS
Status: DISCONTINUED | OUTPATIENT
Start: 2021-08-29 | End: 2021-08-30 | Stop reason: HOSPADM

## 2021-08-28 RX ORDER — SODIUM CHLORIDE, SODIUM LACTATE, POTASSIUM CHLORIDE, CALCIUM CHLORIDE 600; 310; 30; 20 MG/100ML; MG/100ML; MG/100ML; MG/100ML
INJECTION, SOLUTION INTRAVENOUS CONTINUOUS
Status: DISCONTINUED | OUTPATIENT
Start: 2021-08-28 | End: 2021-08-29

## 2021-08-28 RX ORDER — CEFAZOLIN SODIUM 2 G/100ML
2 INJECTION, SOLUTION INTRAVENOUS SEE ADMIN INSTRUCTIONS
Status: DISCONTINUED | OUTPATIENT
Start: 2021-08-28 | End: 2021-08-28 | Stop reason: HOSPADM

## 2021-08-28 RX ORDER — DIPHENHYDRAMINE HYDROCHLORIDE 50 MG/ML
25 INJECTION INTRAMUSCULAR; INTRAVENOUS EVERY 6 HOURS PRN
Status: DISCONTINUED | OUTPATIENT
Start: 2021-08-28 | End: 2021-08-30 | Stop reason: HOSPADM

## 2021-08-28 RX ORDER — FENTANYL CITRATE 50 UG/ML
25 INJECTION, SOLUTION INTRAMUSCULAR; INTRAVENOUS EVERY 5 MIN PRN
Status: DISCONTINUED | OUTPATIENT
Start: 2021-08-28 | End: 2021-08-28 | Stop reason: HOSPADM

## 2021-08-28 RX ORDER — HYDRALAZINE HYDROCHLORIDE 20 MG/ML
2.5-5 INJECTION INTRAMUSCULAR; INTRAVENOUS EVERY 10 MIN PRN
Status: DISCONTINUED | OUTPATIENT
Start: 2021-08-28 | End: 2021-08-28 | Stop reason: HOSPADM

## 2021-08-28 RX ORDER — TRANEXAMIC ACID 10 MG/ML
1 INJECTION, SOLUTION INTRAVENOUS EVERY 30 MIN PRN
Status: DISCONTINUED | OUTPATIENT
Start: 2021-08-28 | End: 2021-08-30 | Stop reason: HOSPADM

## 2021-08-28 RX ORDER — KETOROLAC TROMETHAMINE 30 MG/ML
INJECTION, SOLUTION INTRAMUSCULAR; INTRAVENOUS PRN
Status: DISCONTINUED | OUTPATIENT
Start: 2021-08-28 | End: 2021-08-28

## 2021-08-28 RX ORDER — METOCLOPRAMIDE 10 MG/1
10 TABLET ORAL EVERY 6 HOURS PRN
Status: DISCONTINUED | OUTPATIENT
Start: 2021-08-28 | End: 2021-08-30 | Stop reason: HOSPADM

## 2021-08-28 RX ORDER — ONDANSETRON 4 MG/1
4 TABLET, ORALLY DISINTEGRATING ORAL EVERY 6 HOURS PRN
Status: DISCONTINUED | OUTPATIENT
Start: 2021-08-28 | End: 2021-08-30 | Stop reason: HOSPADM

## 2021-08-28 RX ORDER — OXYTOCIN/0.9 % SODIUM CHLORIDE 30/500 ML
PLASTIC BAG, INJECTION (ML) INTRAVENOUS CONTINUOUS PRN
Status: DISCONTINUED | OUTPATIENT
Start: 2021-08-28 | End: 2021-08-28

## 2021-08-28 RX ORDER — SODIUM CHLORIDE, SODIUM LACTATE, POTASSIUM CHLORIDE, CALCIUM CHLORIDE 600; 310; 30; 20 MG/100ML; MG/100ML; MG/100ML; MG/100ML
INJECTION, SOLUTION INTRAVENOUS CONTINUOUS
Status: DISCONTINUED | OUTPATIENT
Start: 2021-08-28 | End: 2021-08-28 | Stop reason: HOSPADM

## 2021-08-28 RX ORDER — ACETAMINOPHEN 325 MG/1
975 TABLET ORAL ONCE
Status: COMPLETED | OUTPATIENT
Start: 2021-08-28 | End: 2021-08-28

## 2021-08-28 RX ORDER — LIDOCAINE 40 MG/G
CREAM TOPICAL
Status: DISCONTINUED | OUTPATIENT
Start: 2021-08-28 | End: 2021-08-30 | Stop reason: HOSPADM

## 2021-08-28 RX ORDER — SIMETHICONE 80 MG
80 TABLET,CHEWABLE ORAL 4 TIMES DAILY PRN
Status: DISCONTINUED | OUTPATIENT
Start: 2021-08-28 | End: 2021-08-30 | Stop reason: HOSPADM

## 2021-08-28 RX ORDER — HYDROMORPHONE HCL IN WATER/PF 6 MG/30 ML
0.2 PATIENT CONTROLLED ANALGESIA SYRINGE INTRAVENOUS EVERY 5 MIN PRN
Status: DISCONTINUED | OUTPATIENT
Start: 2021-08-28 | End: 2021-08-28 | Stop reason: HOSPADM

## 2021-08-28 RX ORDER — DIPHENHYDRAMINE HCL 25 MG
25 CAPSULE ORAL EVERY 6 HOURS PRN
Status: DISCONTINUED | OUTPATIENT
Start: 2021-08-28 | End: 2021-08-30 | Stop reason: HOSPADM

## 2021-08-28 RX ORDER — MISOPROSTOL 200 UG/1
400 TABLET ORAL
Status: DISCONTINUED | OUTPATIENT
Start: 2021-08-28 | End: 2021-08-30 | Stop reason: HOSPADM

## 2021-08-28 RX ORDER — AMOXICILLIN 250 MG
1 CAPSULE ORAL 2 TIMES DAILY
Status: DISCONTINUED | OUTPATIENT
Start: 2021-08-28 | End: 2021-08-30 | Stop reason: HOSPADM

## 2021-08-28 RX ORDER — NALOXONE HYDROCHLORIDE 1 MG/ML
0.2 INJECTION INTRAMUSCULAR; INTRAVENOUS; SUBCUTANEOUS
Status: DISCONTINUED | OUTPATIENT
Start: 2021-08-28 | End: 2021-08-30 | Stop reason: HOSPADM

## 2021-08-28 RX ORDER — OXYTOCIN 10 [USP'U]/ML
10 INJECTION, SOLUTION INTRAMUSCULAR; INTRAVENOUS
Status: DISCONTINUED | OUTPATIENT
Start: 2021-08-28 | End: 2021-08-28 | Stop reason: HOSPADM

## 2021-08-28 RX ORDER — BUPIVACAINE HYDROCHLORIDE 2.5 MG/ML
INJECTION, SOLUTION EPIDURAL; INFILTRATION; INTRACAUDAL PRN
Status: DISCONTINUED | OUTPATIENT
Start: 2021-08-28 | End: 2021-08-28

## 2021-08-28 RX ORDER — AZITHROMYCIN 500 MG/5ML
500 INJECTION, POWDER, LYOPHILIZED, FOR SOLUTION INTRAVENOUS
Status: COMPLETED | OUTPATIENT
Start: 2021-08-28 | End: 2021-08-28

## 2021-08-28 RX ORDER — HYDROCORTISONE 2.5 %
CREAM (GRAM) TOPICAL 3 TIMES DAILY PRN
Status: DISCONTINUED | OUTPATIENT
Start: 2021-08-28 | End: 2021-08-30 | Stop reason: HOSPADM

## 2021-08-28 RX ORDER — CEFAZOLIN SODIUM 2 G/100ML
2 INJECTION, SOLUTION INTRAVENOUS
Status: COMPLETED | OUTPATIENT
Start: 2021-08-28 | End: 2021-08-28

## 2021-08-28 RX ORDER — CITRIC ACID/SODIUM CITRATE 334-500MG
30 SOLUTION, ORAL ORAL
Status: DISCONTINUED | OUTPATIENT
Start: 2021-08-28 | End: 2021-08-28 | Stop reason: HOSPADM

## 2021-08-28 RX ORDER — NICOTINE POLACRILEX 4 MG
15-30 LOZENGE BUCCAL
Status: DISCONTINUED | OUTPATIENT
Start: 2021-08-28 | End: 2021-08-30 | Stop reason: HOSPADM

## 2021-08-28 RX ORDER — PROCHLORPERAZINE 25 MG
25 SUPPOSITORY, RECTAL RECTAL EVERY 12 HOURS PRN
Status: DISCONTINUED | OUTPATIENT
Start: 2021-08-28 | End: 2021-08-30 | Stop reason: HOSPADM

## 2021-08-28 RX ORDER — KETOROLAC TROMETHAMINE 30 MG/ML
30 INJECTION, SOLUTION INTRAMUSCULAR; INTRAVENOUS EVERY 6 HOURS
Status: DISPENSED | OUTPATIENT
Start: 2021-08-28 | End: 2021-08-29

## 2021-08-28 RX ORDER — FLUMAZENIL 0.1 MG/ML
0.2 INJECTION, SOLUTION INTRAVENOUS
Status: DISCONTINUED | OUTPATIENT
Start: 2021-08-28 | End: 2021-08-28 | Stop reason: HOSPADM

## 2021-08-28 RX ORDER — LIDOCAINE 40 MG/G
CREAM TOPICAL
Status: DISCONTINUED | OUTPATIENT
Start: 2021-08-28 | End: 2021-08-28 | Stop reason: HOSPADM

## 2021-08-28 RX ORDER — FENTANYL CITRATE 50 UG/ML
25-50 INJECTION, SOLUTION INTRAMUSCULAR; INTRAVENOUS
Status: DISCONTINUED | OUTPATIENT
Start: 2021-08-28 | End: 2021-08-28 | Stop reason: HOSPADM

## 2021-08-28 RX ORDER — OXYCODONE HYDROCHLORIDE 5 MG/1
5-10 TABLET ORAL EVERY 4 HOURS PRN
Status: CANCELLED | OUTPATIENT
Start: 2021-08-28

## 2021-08-28 RX ORDER — ALBUTEROL SULFATE 0.83 MG/ML
2.5 SOLUTION RESPIRATORY (INHALATION) EVERY 4 HOURS PRN
Status: DISCONTINUED | OUTPATIENT
Start: 2021-08-28 | End: 2021-08-28 | Stop reason: HOSPADM

## 2021-08-28 RX ORDER — SODIUM CHLORIDE, SODIUM LACTATE, POTASSIUM CHLORIDE, CALCIUM CHLORIDE 600; 310; 30; 20 MG/100ML; MG/100ML; MG/100ML; MG/100ML
INJECTION, SOLUTION INTRAVENOUS
Status: DISCONTINUED
Start: 2021-08-28 | End: 2021-08-28 | Stop reason: HOSPADM

## 2021-08-28 RX ORDER — BUPIVACAINE HYDROCHLORIDE 7.5 MG/ML
INJECTION, SOLUTION INTRASPINAL PRN
Status: DISCONTINUED | OUTPATIENT
Start: 2021-08-28 | End: 2021-08-28

## 2021-08-28 RX ORDER — FENTANYL CITRATE-0.9 % NACL/PF 10 MCG/ML
PLASTIC BAG, INJECTION (ML) INTRAVENOUS CONTINUOUS PRN
Status: DISCONTINUED | OUTPATIENT
Start: 2021-08-28 | End: 2021-08-28

## 2021-08-28 RX ORDER — MODIFIED LANOLIN
OINTMENT (GRAM) TOPICAL
Status: DISCONTINUED | OUTPATIENT
Start: 2021-08-28 | End: 2021-08-30 | Stop reason: HOSPADM

## 2021-08-28 RX ORDER — NALOXONE HYDROCHLORIDE 0.4 MG/ML
0.2 INJECTION, SOLUTION INTRAMUSCULAR; INTRAVENOUS; SUBCUTANEOUS
Status: DISCONTINUED | OUTPATIENT
Start: 2021-08-28 | End: 2021-08-28

## 2021-08-28 RX ORDER — LABETALOL HYDROCHLORIDE 5 MG/ML
10 INJECTION, SOLUTION INTRAVENOUS
Status: DISCONTINUED | OUTPATIENT
Start: 2021-08-28 | End: 2021-08-28 | Stop reason: HOSPADM

## 2021-08-28 RX ORDER — SODIUM CHLORIDE, SODIUM LACTATE, POTASSIUM CHLORIDE, CALCIUM CHLORIDE 600; 310; 30; 20 MG/100ML; MG/100ML; MG/100ML; MG/100ML
INJECTION, SOLUTION INTRAVENOUS CONTINUOUS PRN
Status: DISCONTINUED | OUTPATIENT
Start: 2021-08-28 | End: 2021-08-28

## 2021-08-28 RX ORDER — ONDANSETRON 4 MG/1
4 TABLET, ORALLY DISINTEGRATING ORAL EVERY 30 MIN PRN
Status: DISCONTINUED | OUTPATIENT
Start: 2021-08-28 | End: 2021-08-29

## 2021-08-28 RX ORDER — MISOPROSTOL 200 UG/1
400 TABLET ORAL
Status: DISCONTINUED | OUTPATIENT
Start: 2021-08-28 | End: 2021-08-28 | Stop reason: HOSPADM

## 2021-08-28 RX ORDER — METOCLOPRAMIDE HYDROCHLORIDE 5 MG/ML
10 INJECTION INTRAMUSCULAR; INTRAVENOUS EVERY 6 HOURS PRN
Status: DISCONTINUED | OUTPATIENT
Start: 2021-08-28 | End: 2021-08-30 | Stop reason: HOSPADM

## 2021-08-28 RX ORDER — PROCHLORPERAZINE MALEATE 10 MG
10 TABLET ORAL EVERY 6 HOURS PRN
Status: DISCONTINUED | OUTPATIENT
Start: 2021-08-28 | End: 2021-08-30 | Stop reason: HOSPADM

## 2021-08-28 RX ORDER — TRANEXAMIC ACID 10 MG/ML
1 INJECTION, SOLUTION INTRAVENOUS EVERY 30 MIN PRN
Status: DISCONTINUED | OUTPATIENT
Start: 2021-08-28 | End: 2021-08-28 | Stop reason: HOSPADM

## 2021-08-28 RX ORDER — ACETAMINOPHEN 325 MG/1
975 TABLET ORAL EVERY 6 HOURS
Status: DISCONTINUED | OUTPATIENT
Start: 2021-08-28 | End: 2021-08-30 | Stop reason: HOSPADM

## 2021-08-28 RX ORDER — OXYCODONE HYDROCHLORIDE 5 MG/1
5 TABLET ORAL EVERY 4 HOURS PRN
Status: DISCONTINUED | OUTPATIENT
Start: 2021-08-28 | End: 2021-08-30 | Stop reason: HOSPADM

## 2021-08-28 RX ORDER — DEXTROSE, SODIUM CHLORIDE, SODIUM LACTATE, POTASSIUM CHLORIDE, AND CALCIUM CHLORIDE 5; .6; .31; .03; .02 G/100ML; G/100ML; G/100ML; G/100ML; G/100ML
INJECTION, SOLUTION INTRAVENOUS CONTINUOUS
Status: DISCONTINUED | OUTPATIENT
Start: 2021-08-28 | End: 2021-08-30 | Stop reason: HOSPADM

## 2021-08-28 RX ORDER — OXYTOCIN 10 [USP'U]/ML
10 INJECTION, SOLUTION INTRAMUSCULAR; INTRAVENOUS
Status: DISCONTINUED | OUTPATIENT
Start: 2021-08-28 | End: 2021-08-30 | Stop reason: HOSPADM

## 2021-08-28 RX ORDER — CARBOPROST TROMETHAMINE 250 UG/ML
250 INJECTION, SOLUTION INTRAMUSCULAR
Status: DISCONTINUED | OUTPATIENT
Start: 2021-08-28 | End: 2021-08-28 | Stop reason: HOSPADM

## 2021-08-28 RX ORDER — NALOXONE HYDROCHLORIDE 1 MG/ML
0.4 INJECTION INTRAMUSCULAR; INTRAVENOUS; SUBCUTANEOUS
Status: DISCONTINUED | OUTPATIENT
Start: 2021-08-28 | End: 2021-08-30 | Stop reason: HOSPADM

## 2021-08-28 RX ORDER — OXYTOCIN/0.9 % SODIUM CHLORIDE 30/500 ML
340 PLASTIC BAG, INJECTION (ML) INTRAVENOUS CONTINUOUS PRN
Status: DISCONTINUED | OUTPATIENT
Start: 2021-08-28 | End: 2021-08-28 | Stop reason: HOSPADM

## 2021-08-28 RX ORDER — MISOPROSTOL 200 UG/1
800 TABLET ORAL
Status: DISCONTINUED | OUTPATIENT
Start: 2021-08-28 | End: 2021-08-28 | Stop reason: HOSPADM

## 2021-08-28 RX ORDER — MISOPROSTOL 200 UG/1
800 TABLET ORAL
Status: DISCONTINUED | OUTPATIENT
Start: 2021-08-28 | End: 2021-08-30 | Stop reason: HOSPADM

## 2021-08-28 RX ORDER — BISACODYL 10 MG
10 SUPPOSITORY, RECTAL RECTAL DAILY PRN
Status: DISCONTINUED | OUTPATIENT
Start: 2021-08-30 | End: 2021-08-30 | Stop reason: HOSPADM

## 2021-08-28 RX ORDER — OXYTOCIN 10 [USP'U]/ML
10 INJECTION, SOLUTION INTRAMUSCULAR; INTRAVENOUS
Status: CANCELLED | OUTPATIENT
Start: 2021-08-28

## 2021-08-28 RX ORDER — FENTANYL CITRATE 50 UG/ML
INJECTION, SOLUTION INTRAMUSCULAR; INTRAVENOUS PRN
Status: DISCONTINUED | OUTPATIENT
Start: 2021-08-28 | End: 2021-08-28

## 2021-08-28 RX ORDER — AMOXICILLIN 250 MG
2 CAPSULE ORAL 2 TIMES DAILY
Status: DISCONTINUED | OUTPATIENT
Start: 2021-08-28 | End: 2021-08-30 | Stop reason: HOSPADM

## 2021-08-28 RX ORDER — ONDANSETRON 2 MG/ML
4 INJECTION INTRAMUSCULAR; INTRAVENOUS EVERY 6 HOURS PRN
Status: DISCONTINUED | OUTPATIENT
Start: 2021-08-28 | End: 2021-08-30 | Stop reason: HOSPADM

## 2021-08-28 RX ORDER — ONDANSETRON 2 MG/ML
4 INJECTION INTRAMUSCULAR; INTRAVENOUS EVERY 30 MIN PRN
Status: DISCONTINUED | OUTPATIENT
Start: 2021-08-28 | End: 2021-08-29

## 2021-08-28 RX ORDER — DEXTROSE MONOHYDRATE 25 G/50ML
25-50 INJECTION, SOLUTION INTRAVENOUS
Status: DISCONTINUED | OUTPATIENT
Start: 2021-08-28 | End: 2021-08-30 | Stop reason: HOSPADM

## 2021-08-28 RX ADMIN — KETOROLAC TROMETHAMINE 30 MG: 30 INJECTION, SOLUTION INTRAMUSCULAR; INTRAVENOUS at 11:38

## 2021-08-28 RX ADMIN — ACETAMINOPHEN 975 MG: 325 TABLET, FILM COATED ORAL at 20:55

## 2021-08-28 RX ADMIN — SODIUM CHLORIDE, POTASSIUM CHLORIDE, SODIUM LACTATE AND CALCIUM CHLORIDE: 600; 310; 30; 20 INJECTION, SOLUTION INTRAVENOUS at 04:10

## 2021-08-28 RX ADMIN — Medication 100 ML/HR: at 08:23

## 2021-08-28 RX ADMIN — Medication 500 MG: at 04:32

## 2021-08-28 RX ADMIN — SODIUM CHLORIDE 10 ML/HR: 9 INJECTION, SOLUTION INTRAVENOUS at 00:37

## 2021-08-28 RX ADMIN — MORPHINE SULFATE 0.1 MG: 1 INJECTION EPIDURAL; INTRATHECAL; INTRAVENOUS at 04:17

## 2021-08-28 RX ADMIN — ONDANSETRON 4 MG: 2 INJECTION INTRAMUSCULAR; INTRAVENOUS at 04:46

## 2021-08-28 RX ADMIN — FENTANYL CITRATE 100 MCG: 50 INJECTION, SOLUTION INTRAMUSCULAR; INTRAVENOUS at 01:34

## 2021-08-28 RX ADMIN — KETOROLAC TROMETHAMINE 15 MG: 30 INJECTION, SOLUTION INTRAMUSCULAR at 05:23

## 2021-08-28 RX ADMIN — SENNOSIDES AND DOCUSATE SODIUM 2 TABLET: 8.6; 5 TABLET ORAL at 20:55

## 2021-08-28 RX ADMIN — HUMAN INSULIN 1 UNITS/HR: 100 INJECTION, SOLUTION SUBCUTANEOUS at 00:38

## 2021-08-28 RX ADMIN — HYDROMORPHONE HYDROCHLORIDE 0.2 MG: 0.2 INJECTION, SOLUTION INTRAMUSCULAR; INTRAVENOUS; SUBCUTANEOUS at 07:47

## 2021-08-28 RX ADMIN — Medication 2 G: at 04:23

## 2021-08-28 RX ADMIN — SODIUM CHLORIDE, POTASSIUM CHLORIDE, SODIUM LACTATE AND CALCIUM CHLORIDE 500 ML: 600; 310; 30; 20 INJECTION, SOLUTION INTRAVENOUS at 00:29

## 2021-08-28 RX ADMIN — SODIUM CITRATE AND CITRIC ACID MONOHYDRATE 30 ML: 500; 334 SOLUTION ORAL at 03:54

## 2021-08-28 RX ADMIN — KETOROLAC TROMETHAMINE 30 MG: 30 INJECTION, SOLUTION INTRAMUSCULAR; INTRAVENOUS at 20:56

## 2021-08-28 RX ADMIN — FENTANYL CITRATE 15 MCG: 50 INJECTION, SOLUTION INTRAMUSCULAR; INTRAVENOUS at 04:17

## 2021-08-28 RX ADMIN — Medication 50 MCG/MIN: at 04:22

## 2021-08-28 RX ADMIN — BUPIVACAINE HYDROCHLORIDE IN DEXTROSE 1.7 ML: 7.5 INJECTION, SOLUTION SUBARACHNOID at 04:17

## 2021-08-28 RX ADMIN — BUPIVACAINE 20 ML: 13.3 INJECTION, SUSPENSION, LIPOSOMAL INFILTRATION at 05:45

## 2021-08-28 RX ADMIN — ACETAMINOPHEN 975 MG: 325 TABLET, FILM COATED ORAL at 11:38

## 2021-08-28 RX ADMIN — BUPIVACAINE HYDROCHLORIDE 20 ML: 2.5 INJECTION, SOLUTION EPIDURAL; INFILTRATION; INTRACAUDAL at 05:45

## 2021-08-28 RX ADMIN — OXYTOCIN-SODIUM CHLORIDE 0.9% IV SOLN 30 UNIT/500ML 600 ML/HR: 30-0.9/5 SOLUTION at 04:45

## 2021-08-28 RX ADMIN — OXYCODONE HYDROCHLORIDE 5 MG: 5 TABLET ORAL at 17:56

## 2021-08-28 NOTE — ANESTHESIA POSTPROCEDURE EVALUATION
Patient: Armida Da Silva    Procedure(s):   SECTION    Diagnosis:* No pre-op diagnosis entered *  Diagnosis Additional Information: No value filed.    Anesthesia Type:  Spinal, Peripheral Nerve Block    Note:  Disposition: Inpatient   Postop Pain Control: Uneventful            Sign Out: Well controlled pain   PONV:    Neuro/Psych: Uneventful            Sign Out: Acceptable/Baseline neuro status   Airway/Respiratory: Uneventful            Sign Out: Acceptable/Baseline resp. status   CV/Hemodynamics: Uneventful            Sign Out: Acceptable CV status; No obvious hypovolemia; No obvious fluid overload   Other NRE:    DID A NON-ROUTINE EVENT OCCUR?            Last vitals:  Vitals Value Taken Time   /75 21 0600   Temp 37.7  C (99.9  F) 21 0550   Pulse 80 21 0601   Resp 20 21 0555   SpO2 99 % 21 0601   Vitals shown include unvalidated device data.    Electronically Signed By: iBjan Rios MD  2021  6:02 AM

## 2021-08-28 NOTE — ANESTHESIA PREPROCEDURE EVALUATION
Anesthesia Pre-Procedure Evaluation    Patient: Armida Da Silva   MRN: [id number] : 1995        Preoperative Diagnosis: * No surgery found *   Procedure :      Past Medical History:   Diagnosis Date     Obesity, BMI 38      PCOS (polycystic ovarian syndrome)      Pre-diabetes       Past Surgical History:   Procedure Laterality Date     NO HISTORY OF SURGERY        No Known Allergies   Social History     Tobacco Use     Smoking status: Never Smoker     Smokeless tobacco: Never Used   Substance Use Topics     Alcohol use: Never      Wt Readings from Last 1 Encounters:   21 110.2 kg (243 lb)              OUTSIDE LABS:  CBC:   Lab Results   Component Value Date    WBC 7.6 2021    WBC 8.3 2021    HGB 12.7 2021    HGB 12.7 2021    HCT 37.0 2021    HCT 38.7 2021     2021     2021     BMP:   Lab Results   Component Value Date     2021    POTASSIUM 3.5 2021    CHLORIDE 107 2021    CO2 20 2021    BUN 5 (L) 2021    CR 0.32 (L) 2021     (H) 2021     (H) 2021     COAGS: No results found for: PTT, INR, FIBR  POC: No results found for: BGM, HCG, HCGS  HEPATIC:   Lab Results   Component Value Date    ALT 22 2021    AST 20 2021     OTHER:   Lab Results   Component Value Date    A1C 5.2 2021    TORI 9.0 2021    TSH 3.51 2020       Anesthesia Plan    ASA Status:  3, emergent    NPO Status:  ELEVATED Aspiration Risk/Unknown    Anesthesia Type: Spinal.              Consents    Anesthesia Plan(s) and associated risks, benefits, and realistic alternatives discussed. Questions answered and patient/representative(s) expressed understanding.     - Discussed with:  Patient,       - Extended Intubation/Ventilatory Support Discussed: No.      - Patient is DNR/DNI Status: No    Use of blood products discussed: No .     Postoperative Care    Pain management: Neuraxial  analgesia, IV analgesics, Oral pain medications, Multi-modal analgesia, Peripheral nerve block (Single Shot).   PONV prophylaxis: Ondansetron (or other 5HT-3)     Comments:                Bijan Rios MD     ANESTHESIA PREOP EVALUATION    PROCEDURE: c/s    HPI: Armida Da Silva is a 26 year old female who presents for above procedure 2/2 failure to progress. Cat II tracing.    PAST MEDICAL HISTORY:    Past Medical History:   Diagnosis Date     Obesity, BMI 38      PCOS (polycystic ovarian syndrome)      Pre-diabetes        PAST SURGICAL HISTORY:    Past Surgical History:   Procedure Laterality Date     NO HISTORY OF SURGERY         SOCIAL HISTORY:       Social History     Tobacco Use     Smoking status: Never Smoker     Smokeless tobacco: Never Used   Substance Use Topics     Alcohol use: Never       ALLERGIES:     No Known Allergies    MEDICATIONS:     Medications Prior to Admission   Medication Sig Dispense Refill Last Dose     LANsoprazole (PREVACID) 30 MG DR capsule Take 1 capsule (30 mg) by mouth daily 30 capsule 3 8/26/2021 at Unknown time     metFORMIN (GLUCOPHAGE) 850 MG tablet TAKE 1 TABLET BY MOUTH ONCE DAILY WITH BREAKFAST   8/27/2021 at 0730     SM ASPIRIN ADULT LOW STRENGTH 81 MG EC tablet TAKE 1 TABLET (81 MG) BY MOUTH DAILY START AFTER 12 WEEKS OF PREGNANCY 30 tablet 17 8/27/2021 at Unknown time     vitamin D3 (CHOLECALCIFEROL) 125 MCG (5000 UT) tablet Take 1 tablet (125 mcg) by mouth daily 60 tablet 3 8/27/2021 at Unknown time     acetaminophen (TYLENOL) 325 MG tablet Take 1-2 tablets (325-650 mg) by mouth every 8 hours as needed for mild pain (Patient not taking: Reported on 4/8/2021) 30 tablet 0      alcohol swab prep pads Use to swab area of injection/eduardo as directed 100 each 3      blood glucose (NO BRAND SPECIFIED) test strip Use to test blood sugar 4 times daily or as directed. To accompany: Blood Glucose Monitor Brands: per insurance. 100 strip 6      blood glucose calibration (NO BRAND  SPECIFIED) solution Use to calibrate blood glucose monitor as needed as directed. To accompany: Blood Glucose Monitor Brands: per insurance. 1 Bottle 3      blood glucose monitoring (ACCU-CHEK MARA PLUS) meter device kit Use to test blood sugar 4 times daily or as directed. 1 kit 0      blood glucose monitoring (ACCU-CHEK MULTICLIX) lancets Test 4 times daily, fasting and 1 hour after meals 100 each 4      docusate sodium (COLACE) 100 MG capsule TAKE 1 TABLET (100 MG) BY MOUTH DAILY MAY TAKE UP TO 2 TIMES A DAY 60 capsule 1      docusate sodium (COLACE) 100 MG tablet Take 1 tablet (100 mg) by mouth daily May take up to 2 times a day 60 tablet 0      ondansetron (ZOFRAN-ODT) 4 MG ODT tab Take 1 tablet (4 mg) by mouth every 8 hours as needed for nausea 30 tablet 1      polyethylene glycol (MIRALAX) 17 GM/Dose powder Take 17 g by mouth daily as needed for constipation 510 g 1      Prenatal Vit-Fe Fumarate-FA (PRENATAL VITAMIN) 27-0.8 MG TABS Take 1 tablet by mouth daily (Patient not taking: Reported on 4/8/2021) 90 tablet 3        No current outpatient medications on file.       Current Facility-Administered Medications Ordered in Epic   Medication Dose Route Frequency Provider Last Rate Last Admin     acetaminophen (TYLENOL) tablet 650 mg  650 mg Oral Q4H PRN Zahira Love MD         carboprost (HEMABATE) injection 250 mcg  250 mcg Intramuscular Q15 Min PRN Zahira Love MD         glucose gel 15-30 g  15-30 g Oral Q15 Min PRN Zahira Love MD        Or     dextrose 50 % injection 25-50 mL  25-50 mL Intravenous Q15 Min PRZahira Roberto MD        Or     glucagon injection 1 mg  1 mg Subcutaneous Q15 Min PRN Zahira Love MD         glucose gel 15-30 g  15-30 g Oral Q15 Min PRN Fela Ayala MD        Or     dextrose 50 % injection 25-50 mL  25-50 mL Intravenous Q15 Min PRFela Araujo MD        Or     glucagon injection 1 mg  1 mg Subcutaneous Q15 Min PRFela Araujo MD         fentaNYL (PF)  (SUBLIMAZE) injection 100 mcg  100 mcg Intravenous Once Fela Ayala MD         fentaNYL (PF) (SUBLIMAZE) injection  mcg   mcg Intravenous Q1H PRN Zahira Love MD   100 mcg at 08/28/21 0134     fentaNYL-Bupivacaine-NaCl 0.2-0.125-0.9 MG/100ML-% injection SOLN              hydrOXYzine (ATARAX) tablet 50 mg  50 mg Oral At Bedtime PRN Zahira Love MD         ketorolac (TORADOL) injection 30 mg  30 mg Intravenous Once PRN Zahira Love MD        Or     ketorolac (TORADOL) injection 30 mg  30 mg Intramuscular Once PRN Zahira Love MD        Or     ibuprofen (ADVIL/MOTRIN) tablet 600 mg  600 mg Oral Once PRN Zahira Love MD         insulin aspart (NovoLOG) injection (RAPID ACTING)  1-6 Units Subcutaneous Q4H Zahira Love MD         insulin regular (HumuLIN R,NovoLIN R) infusion (1 unit/mL) ADULT/PEDS   Intravenous Continuous PRN Fela Ayala MD 1 mL/hr at 08/28/21 0147 1 Units/hr at 08/28/21 0147     lidocaine (PF) (XYLOCAINE) 1 % injection              lidocaine 1 % 0.1-20 mL  0.1-20 mL Subcutaneous Once PRN Zahira Love MD         Medication Instructions - cervical ripening and induction medications   Does not apply Continuous PRN Zahira Love MD         methylergonovine (METHERGINE) injection 200 mcg  200 mcg Intramuscular Q2H PRN Zahira Love MD         metoclopramide (REGLAN) injection 10 mg  10 mg Intravenous Q6H PRN Zahira Love MD        Or     metoclopramide (REGLAN) tablet 10 mg  10 mg Oral Q6H PRN Zahira Love MD         misoprostol (cervical ripening) (CYTOTEC) quarter-tab 25 mcg  25 mcg Vaginal Q4H PRN Zahira Love MD   25 mcg at 08/27/21 0925     misoprostol (CYTOTEC) 200 MCG tablet              misoprostol (CYTOTEC) tablet 400 mcg  400 mcg Oral ONCE PRN REPEAT PER INSTRUCTIONS Zahira Love MD        Or     misoprostol (CYTOTEC) tablet 800 mcg  800 mcg Rectal ONCE PRN REPEAT PER INSTRUCTIONS Zahira Love MD         Morphine Sulfate (PF) injection  10 mg  10 mg Intramuscular Once PRN Zahira Love MD         naloxone (NARCAN) injection 0.2 mg  0.2 mg Intravenous Q2 Min PRN Zahira Love MD        Or     naloxone (NARCAN) injection 0.4 mg  0.4 mg Intravenous Q2 Min PRN Zahira Love MD        Or     naloxone (NARCAN) injection 0.2 mg  0.2 mg Intramuscular Q2 Min PRN Zahira Love MD        Or     naloxone (NARCAN) injection 0.4 mg  0.4 mg Intramuscular Q2 Min PRN Zahira Love MD         nitrous oxide/oxygen 50/50 blend   Inhalation Continuous PRN Zahira Love MD         ondansetron (ZOFRAN-ODT) ODT tab 4 mg  4 mg Oral Q6H PRN Zahira Love MD        Or     ondansetron (ZOFRAN) injection 4 mg  4 mg Intravenous Q6H PRN Zahira Love MD         oxytocin (PITOCIN) 10 UNIT/ML injection              oxytocin (PITOCIN) 30 units in 500 mL 0.9% NaCl infusion  100-340 mL/hr Intravenous Continuous PRN Zahira Love MD         oxytocin (PITOCIN) 30 units in 500 mL 0.9% NaCl infusion  340 mL/hr Intravenous Continuous PRN Zahira Love MD         oxytocin (PITOCIN) injection 10 Units  10 Units Intramuscular Once PRN Zahira Love MD         oxytocin (PITOCIN) injection 10 Units  10 Units Intramuscular Once PRN Zahira Love MD         oxytocin in 0.9% NaCl (PITOCIN) 30 units/500 mL infusion              prochlorperazine (COMPAZINE) injection 10 mg  10 mg Intravenous Q6H PRN Zahira Love MD        Or     prochlorperazine (COMPAZINE) tablet 10 mg  10 mg Oral Q6H PRN Zahira Love MD        Or     prochlorperazine (COMPAZINE) suppository 25 mg  25 mg Rectal Q12H PRN Zahira Love MD         sodium chloride 0.9% infusion   Intravenous Continuous Fela Ayala MD 10 mL/hr at 08/28/21 0037 10 mL/hr at 08/28/21 0037     sodium citrate-citric acid (BICITRA) solution 30 mL  30 mL Oral Once Zahira Love MD         tranexamic acid 1 g in 100 mL 0.7% NaCl IV bag (premix)  1 g Intravenous Q30 Min PRN Zahira Love MD         No current  [facility]-ordered outpatient medications on file.       PHYSICAL EXAM:    Vitals: T 98.6, P 84, /77, R 20, SpO2  , Weight   Wt Readings from Last 2 Encounters:   08/27/21 110.2 kg (243 lb)   08/18/21 110.2 kg (243 lb)       See doc flowsheet    NPO STATUS: see doc flowsheet    LABS:    BMP:  Recent Labs   Lab Test 08/28/21  0237 02/19/21  1615   NA  --  138   POTASSIUM  --  3.5   CHLORIDE  --  107   CO2  --  20   BUN  --  5*   CR  --  0.32*   * 96   TORI  --  9.0       LFTs:   Recent Labs   Lab Test 02/19/21  1615   AST 20   ALT 22       CBC:   Recent Labs   Lab Test 08/27/21  0915   WBC 7.6   RBC 4.44   HGB 12.7   HCT 37.0   MCV 83   MCH 28.6   MCHC 34.3   RDW 13.6          Coags:  No results for input(s): INR, PTT, FIBR in the last 59331 hours.    Imaging:  No orders to display       Bijan Rios MD  Anesthesiology Staff  Pager [phone]    8/28/2021  3:21 AM

## 2021-08-28 NOTE — PLAN OF CARE
Data: Armida Da Silva transferred to [id number] via cart at 0809. Baby transferred via parent's arms.  Action: Receiving unit notified of transfer: Yes. Patient and family notified of room change. Report given RN at 0805. Belongings sent to receiving unit. Accompanied by Registered Nurse. Oriented patient to surroundings. Call light within reach. ID bands double-checked with receiving RN.  Response: Patient tolerated transfer and is stable.

## 2021-08-28 NOTE — PLAN OF CARE
BPs elevated, no severe range requiring treatment. All other VSS. BG at 110, recheck 106. Original IV site no longer flushing and painful. Difficulty obtaining IV access and CRNA planning to come to find 2nd site. Patient declining medication intervention to manage labor pain, encouraging position changes and continued breathing techniques. Will continue to monitor.

## 2021-08-28 NOTE — PROGRESS NOTES
[facility]  Labor Progress Note    S:   Uncomfortable with contractions. Needs new IV.    O:   Patient Vitals for the past 4 hrs:   BP Temp Temp src Resp   21 (!) 145/82 99.1  F (37.3  C) Oral 20   21 (!) 137/94 98.3  F (36.8  C) Oral 22       SVE: 5-/70/-2; Chaperoned by KATIE NATARAJAN  Baseline: 140  Variability: Moderate  Accels: Present  Decels: Absent  [place]: 4-5 in 10 minutes    A/P:  Armida Da Silva is a 26 year old  at 39w0d here for IOL for CHTN and T2DM.    #Labor:  - SVE: closed > PV miso x1 (925) > 1/50/-4 ()> tavares out /-2 () > SROM () > 5-6/70/-2 (, ) >   - Pain: aware of options  - GBS neg  - Plan: expectant management    #CHTN   - BP MR  - No PTA meds   - B/l HELLP labs  wnl - UCP 0.08    #T2DM  - PTA metformin [held]   - mSSI, QID BG > q1hr BG  - Shoulder precautions    #Fetal Well Being  - Category I FHT  - Continuous EFM  - Interventions PRN    Scot Urrutia MD MPH  OB/Gyn PGY-3  21 11:35 PM

## 2021-08-28 NOTE — PROGRESS NOTES
[facility]  Labor Progress Note    S:   Very uncomfortable. Moaning in pain between contractions.    O:   Patient Vitals for the past 4 hrs:   BP Temp Temp src Resp   21 0300 -- 98.6  F (37  C) Oral 20   21 0145 (!) 141/77 -- -- 18   21 0000 (!) 148/79 98.9  F (37.2  C) Oral 20       SVE: 6-7/80/-2; Attempted IUPC but unable due to discomfort. Chaperoned by KATIE Marti    FHT  Baseline: 135  Variability: Moderate  Accels: Present  Decels: Intermittent late and variable decels  [place]: 3-4 in 10 minutes, irregular    B-116    A/P:  Armida Da Silva is a 26 year old  at 39w1d here for IOL for CHTN and T2DM.    #Labor:  - SVE: closed > PV miso x1 (925) > 1/50/-4 ()> tavares out /-2 () > SROM () > 5-6/70/-2 (, ) > 6-7/80/-2 (300)  - s/p benadryl for edematous cervx  - Pain: requesting epidural  - GBS neg  - Plan: expectant management, pit when able. Re-attempt IUPC s/p epidural    #CHTN   - BP MR  - No PTA meds   - B/l HELLP labs  wnl - UCP 0.08    #T2DM  - PTA metformin [held]   - mSSI, QID BG > insulin gtt  - Shoulder precautions    #Fetal Well Being  - Category 2 FHT with intermittent variable and late decelerations. Still has moderate variability and accels. S/p 500cc fluid bolus. IUPC when able.  - Continuous EFM  - Interventions PRN    Scot Urrutia MD MPH  OB/Gyn PGY-3  21 3:12 AM    [facility] staff:  Appreciate note by Dr. Urrutia.  I have seen and examined the patient without the resident. I have reviewed the scenario and labor course with family. In setting of category 2 tracing, we discussed that epidural may make that worse, necessitating a c/s.  We also discussed the minimal change of her cervix over course of 7 hours.  Pt desires to proceed with c/s at this point.  Anesthesia made aware of change in plan.  Consent obtained.       Natalie Schaeffer MD, FACOG  2021  3:56 AM

## 2021-08-28 NOTE — ANESTHESIA PROCEDURE NOTES
Intrathecal injection Procedure Note  Pre-Procedure   Staff -        Anesthesiologist:  Bijan Rios MD       Performed By: anesthesiologist       Location: OR       Procedure Start/Stop Times: 8/28/2021 4:14 AM and 8/28/2021 4:17 AM       Pre-Anesthestic Checklist: patient identified, IV checked, risks and benefits discussed, informed consent, monitors and equipment checked, pre-op evaluation, at physician/surgeon's request and post-op pain management  Timeout:       Correct Patient: Yes        Correct Procedure: Yes        Correct Site: Yes        Correct Position: Yes   Procedure Documentation  Procedure: intrathecal injection       Diagnosis: failure to progress/CATII tracing c/s       Patient Position: sitting       Patient Prep/Sterile Barriers: sterile gloves, mask       Skin prep: Chloraprep       Insertion Site: L2-3. (midline approach).       Needle Gauge: 25.        Needle Length (Inches): 3.5        Spinal Needle Type: Pencan       Introducer used       Introducer: 20 G       # of attempts: 1 and  # of redirects:  3    Assessment/Narrative         Paresthesias: No.       CSF fluid: clear.    Comments:  Challenging to get patient to position via . Challenging to palpate

## 2021-08-28 NOTE — PLAN OF CARE
Transfer to OR & C/S Delivery Note  Patient to OR at 0404 via wheelchair. -unscheduled C/S for failure to progress with intermittent category 2 tracings  Delivered viable Male via  section by Dr. Schaeffer and Dr. Urrutia at 0444.  To warmer, stimulated and dried by Nursery RN, Lisha Levine.  APGAR at 1 minute:  9 and APGAR at 5 minutes:  10.    Cord gases sent- WNL  Brought to mother/father for bonding after bundling

## 2021-08-28 NOTE — PLAN OF CARE
Data: Vital signs within normal limits. Postpartum checks within normal limits - see flow record. Patient eating and drinking normally. Patient able to empty bladder independently and is up ambulating. Incision healing well. Patient performing self cares and is able to care for infant.  Action: Patient medicated during the shift for incisional soreness.   Will continue to monitor and provide support.

## 2021-08-28 NOTE — ANESTHESIA CARE TRANSFER NOTE
Patient: Armida Da Silva    Procedure(s):   SECTION    Diagnosis: * No pre-op diagnosis entered *  Diagnosis Additional Information: No value filed.    Anesthesia Type:   Spinal, Peripheral Nerve Block     Note:    Oropharynx: oropharynx clear of all foreign objects and spontaneously breathing  Level of Consciousness: awake  Oxygen Supplementation: room air    Independent Airway: airway patency satisfactory and stable  Dentition: dentition unchanged  Vital Signs Stable: post-procedure vital signs reviewed and stable  Report to RN Given: handoff report given  Patient transferred to: Labor and Delivery    Handoff Report: Identifed the Patient, Identified the Reponsible Provider, Reviewed the pertinent medical history, Discussed the surgical course, Reviewed Intra-OP anesthesia mangement and issues during anesthesia, Set expectations for post-procedure period and Allowed opportunity for questions and acknowledgement of understanding      Vitals:  Vitals Value Taken Time   /75 21 0600   Temp 37.7  C (99.9  F) 21 0550   Pulse 78 21 0602   Resp 20 21 0555   SpO2 98 % 21 0602   Vitals shown include unvalidated device data.    Electronically Signed By: DEEPIKA Stokes CRNA  2021  6:03 AM

## 2021-08-28 NOTE — DISCHARGE SUMMARY
DELIVERY DISCHARGE SUMMARY    Armida Da Silva  : 1995  MRN: [id number]    Admit date: 2021  Discharge date: 21    Admit Dx:   - 26 year old  at 39w1d  - Intrauterine pregnancy at 39w1d  - Failure to progress in labor  - Suspected asynclitic fetal presentation  - T2DM  - Chronic hypertension  - Obesity    Discharge Dx:  - Same as above, s/p procedure below  - Acute blood loss anemia    Procedures:  - Primary low transverse  section with double layer closure via Pfannenstiel incision    Admit HPI:  Ms. Armida Da Silva is a 26 year old  at 39w1d who was admitted on 2021 for an induction of labor for chronic hypertension and Type II DM.    Please see her admit H&P for full details of her PMH, PSH, Meds, Allergies and exam on admit.    Consults:  Anesthesia  Lactation    Intrapartum course:  Indications:   Armida Da Silva is a 26 year old  at 39w1d admitted for IOL for T2DM and CHTN. She was induced with 1 dose of vaginal misoprostol and a tavares balloon. She SROM'ed after the tavares fell out and was 5cm. The fetal position was suspected to be asynclitic and she made minimal to no change over the next 7 hours. A  section was recommended for failure to progress in labor. The risks, benefits, and alternatives of  section were discussed with the patient, and she agreed to proceed.      Operative Findings:   1. No adhesions  2. Thick but clear amniotic fluid  3. Liveborn male infant in vertex presentation. Born at 0444 on 21. Apgars 9 at 1 minute & 10 at 5 minutes. Weight 7lb 1.9oz.  4. Cord gasses below  5. Normal uterus, fallopian tubes, and ovaries. Prominent sacral promontory   EBL from the delivery was 800mL. Please see her  Section Operative Note for full details regarding her delivery.    Hospital course:  Her postoperative course was uncomplicated. On POD#2, she was meeting all of her postpartum goals and deemed stable for discharge. She was  voiding without difficulty, tolerating a regular diet without nausea and vomiting, her pain was well controlled on oral pain medicines and her lochia was appropriate. Her hemoglobin prior to delivery was 12.7 and after delivery was 9.9. Her Rh status was positive and Rhogam was not indicated.      HGB  Recent Labs   Lab 21  0945 21  0915   HGB 9.9* 12.7       Discharge Medications:     Review of your medicines      START taking      Dose / Directions   ferrous sulfate 325 (65 Fe) MG tablet  Commonly known as: FEROSUL  Used for: Anemia due to blood loss, acute      Dose: 325 mg  Take 1 tablet (325 mg) by mouth daily (with breakfast)  Quantity: 30 tablet  Refills: 0     ibuprofen 600 MG tablet  Commonly known as: ADVIL/MOTRIN  Used for: S/P  section      Dose: 600 mg  Take 1 tablet (600 mg) by mouth every 6 hours as needed for moderate pain Start after delivery  Quantity: 60 tablet  Refills: 0     oxyCODONE 5 MG tablet  Commonly known as: ROXICODONE  Used for: S/P  section      Dose: 5 mg  Take 1 tablet (5 mg) by mouth every 6 hours as needed for breakthrough pain or pain  Quantity: 5 tablet  Refills: 0     senna-docusate 8.6-50 MG tablet  Commonly known as: SENOKOT-S/PERICOLACE  Used for: S/P  section      Dose: 1 tablet  Take 1 tablet by mouth daily Start after delivery.  Quantity: 100 tablet  Refills: 0        CONTINUE these medicines which may have CHANGED, or have new prescriptions. If we are uncertain of the size of tablets/capsules you have at home, strength may be listed as something that might have changed.      Dose / Directions   docusate sodium 100 MG tablet  Commonly known as: COLACE  This may have changed: Another medication with the same name was removed. Continue taking this medication, and follow the directions you see here.  Used for: Supervision of high risk pregnancy in first trimester      Dose: 100 mg  Take 1 tablet (100 mg) by mouth daily May take up to 2  times a day  Quantity: 60 tablet  Refills: 0        CONTINUE these medicines which have NOT CHANGED      Dose / Directions   acetaminophen 325 MG tablet  Commonly known as: TYLENOL  Used for: Supervision of high risk pregnancy in first trimester      Dose: 325-650 mg  Take 1-2 tablets (325-650 mg) by mouth every 8 hours as needed for mild pain  Quantity: 30 tablet  Refills: 0     alcohol swab prep pads  Used for: Pre-diabetes      Use to swab area of injection/eduardo as directed  Quantity: 100 each  Refills: 3     blood glucose calibration solution  Commonly known as: NO BRAND SPECIFIED  Used for: Pre-diabetes      Use to calibrate blood glucose monitor as needed as directed. To accompany: Blood Glucose Monitor Brands: per insurance.  Quantity: 1 Bottle  Refills: 3     blood glucose monitoring lancets  Used for: Pre-diabetes      Test 4 times daily, fasting and 1 hour after meals  Quantity: 100 each  Refills: 4     blood glucose monitoring meter device kit  Used for: Prediabetes      Use to test blood sugar 4 times daily or as directed.  Quantity: 1 kit  Refills: 0     blood glucose test strip  Commonly known as: NO BRAND SPECIFIED  Used for: Pre-diabetes      Use to test blood sugar 4 times daily or as directed. To accompany: Blood Glucose Monitor Brands: per insurance.  Quantity: 100 strip  Refills: 6     LANsoprazole 30 MG DR capsule  Commonly known as: PREVACID  Used for: Gastroesophageal reflux disease with esophagitis without hemorrhage      Dose: 30 mg  Take 1 capsule (30 mg) by mouth daily  Quantity: 30 capsule  Refills: 3     ondansetron 4 MG ODT tab  Commonly known as: ZOFRAN-ODT  Used for: Supervision of high risk pregnancy in first trimester      Dose: 4 mg  Take 1 tablet (4 mg) by mouth every 8 hours as needed for nausea  Quantity: 30 tablet  Refills: 1     polyethylene glycol 17 GM/Dose powder  Commonly known as: MIRALAX  Used for: Constipation      Dose: 17 g  Take 17 g by mouth daily as needed for  constipation  Quantity: 510 g  Refills: 1     Prenatal Vitamin 27-0.8 MG Tabs  Used for: Supervision of high risk pregnancy, antepartum, Pre-existing hypertension affecting pregnancy, antepartum      Dose: 1 tablet  Take 1 tablet by mouth daily  Quantity: 90 tablet  Refills: 3     vitamin D3 125 MCG (5000 UT) tablet  Commonly known as: CHOLECALCIFEROL  Used for: Vitamin D deficiency      Dose: 125 mcg  Take 1 tablet (125 mcg) by mouth daily  Quantity: 60 tablet  Refills: 3        STOP taking    metFORMIN 850 MG tablet  Commonly known as: GLUCOPHAGE        SM Aspirin Adult Low Strength 81 MG EC tablet  Generic drug: aspirin              Where to get your medicines      These medications were sent to [facility] [id number] [place], [place] - [address] [facility]  [address] [facility] [address]    Phone: [phone]     ferrous sulfate 325 (65 Fe) MG tablet    ibuprofen 600 MG tablet    senna-docusate 8.6-50 MG tablet     Some of these will need a paper prescription and others can be bought over the counter. Ask your nurse if you have questions.    Bring a paper prescription for each of these medications    oxyCODONE 5 MG tablet         Discharge/Disposition:  Armida Da Silva was discharged to home in stable condition with the following instructions/medications:  1) Call for temperature > 100.4, bright red vaginal bleeding >1 pad an hour x 2 hours, foul smelling vaginal discharge, pain not controlled by usual oral pain meds, persistent nausea and vomiting not controlled on medications, drainage or redness from incision site  2) She desired to discuss with  and decide outpatient for contraception.  3) For feeding she decided to breast feed.  4) She was instructed to follow-up with her primary OB in 6 weeks for a routine postpartum visit and   In 1 week for BP check.  5) Discharge activity:  No heavy lifting >15 lbs or strenuous activity for 6 weeks, pelvic rest for 6 weeks, no driving or operating machinery  while on narcotics.    Carmen Khalil MD  ObGyn Resident, PGY-2  August 30, 2021 8:06 AM

## 2021-08-28 NOTE — PROVIDER NOTIFICATION
08/28/21 0330   Provider Notification   Provider Name/Title Dr. Schaeffer   Method of Notification At Bedside   Request Evaluate in Person   Notification Reason Decels   Pt continues to have decelerations with at least half of her contractions with minimal to no cervical change. Between Dr. Bean discussion with patient, Dr. Urrutia and myself, it was decided that a c/section was necessary. Will start beginning c/s prep at this time.   English

## 2021-08-28 NOTE — PROVIDER NOTIFICATION
08/28/21 0400   Fetal Assessment   Fetal HR Assessment Method external US   Fetal HR (beats/min) 140   Fetal Heart Baseline Rate normal range   Fetal HR Variability moderate (amplitude range 6 to 25 bpm)   Fetal HR Accelerations greater than/equal to 15 bpm;lasting at least 15 seconds   Fetal HR Decelerations variable;intermittent   Dr. Schaeffer at bedside and aware of decelerations continuing- proceeding to OR momentarily.

## 2021-08-28 NOTE — PROGRESS NOTES
"Pt more uncomfortable and tavares balloon is out.   Declines epidural at this point.    BP (!) 142/81   Pulse 84   Temp 97.8  F (36.6  C) (Oral)   Resp 16   Ht 1.676 m (5' 6\")   Wt 110.2 kg (243 lb)   LMP 2020   BMI 39.22 kg/m    Cervix: 590/-2  FHT: cat 1- 130s w/ accels, no decels    A/p: IOL 2/2 HTN t2DM.  BPs mild range. Last BS 77    Continue labor  Pain control per pt request  Anticipate     Natalie Schaeffer MD, FACOG  (she/her/hers)    Department of Ob/Gyn/Women's Health  [employer]  [facility]  [address] [place], [place] [address] [address]  [email or web address]  p. [phone]  f. [phone]    "

## 2021-08-28 NOTE — PROVIDER NOTIFICATION
08/28/21 0200   Fetal Assessment   Fetal HR Assessment Method external US   Fetal HR (beats/min) 140   Fetal Heart Baseline Rate normal range   Fetal HR Variability moderate (amplitude range 6 to 25 bpm)   Fetal HR Accelerations greater than/equal to 15 bpm;lasting at least 15 seconds   Fetal HR Decelerations variable;recurrent;late   Provider notification:  Provider Name: Dr. Renan RUIZ. Notified at 0155 regarding a persistent category II fetal heart rate tracing for 30/60 minutes.   Baseline rate 140, normal  Variability minimal-moderate  Accelerations present  Decelerations present- variables and late decelerations intermittent    EFM interpretation suggests no concern for fetal metabolic acidemia at this time due to moderate variability and accelerations present    Uterine Activity irregular.(coupling and contractions spacing)    Interventions to improve fetal oxygenation for a category II tracing include:maternal positioning, IV fluid bolus , increase frequency of assessment, consult Category 2 algorithm, sterile vaginal exam, blood pressure check and emotional support    After discussion with provider:Plan reassessment in 30-60 minutes

## 2021-08-28 NOTE — PROVIDER NOTIFICATION
08/28/21 0000   Fetal Assessment   Fetal HR Assessment Method external US   Fetal HR (beats/min) 140   Fetal Heart Baseline Rate normal range   Fetal HR Variability moderate (amplitude range 6 to 25 bpm)   Fetal HR Accelerations greater than/equal to 15 bpm;lasting at least 15 seconds   Fetal HR Decelerations variable;late;intermittent   RN Strip Review Continuous   Intrauterine Corrective Measures Reposition;IV Fluid bolus   Pt having intermittent variable and late decelerations. Pt moved to right side at 0010 with peanut ball in place. IV fluid bolus started. Will continue to monitor closely.

## 2021-08-28 NOTE — DISCHARGE INSTRUCTIONS
Birth Discharge Instructions: Comoran  Waxqabadka    Salazar qaadin wax kabadan 10 roodal 6 asbuuc kadib qalliinka. Waydii qoyska iyo saxibadaa caawin [facility]ad u [facility].     Salazar wadin gaadhi markaad qaadanayso kaniiniyada xanunka ee uu dhakhtarkaagu kuu qoro. Waxaad wadi kartaa gadhi haddii aad qaadanayso kaniiniyada xanuunka ee koontarka.     Lama ogala jimicsi adag ama howl culus 6 asbuuc. Salazar samayn wax dhib ku keeni sophie meesha qalliinka lagu sameeeyay.    Salazar bibiana jiidin adoo aad u doconaya markaad isticmaalayso musqusha. Kooxdaada daryeelka ayaa waxay ku qori doonaan wax saxarada jilciya haddii ay dhibaato kaa haysato saxarada oo adag (caloosha oo fadhida) .    Ka taxadar meesha la qalay:    Meesha la qalay nadiif salazar ahaato hana qallalnaato    Salazar ku buuxin meesha la qalay biyo. Dabaal ama tuubooyinka biyaha kulul lama ogala ilaa uu qalliinku sudeep ahaanba bogsado. Waxaad isku furi kartaa tuubada qabayska haddii heerka biyuhu ay ka hoooseeyaan meesha qalliinka.    Dhaqidda kadib, meesha qalliinka si fican u qalaji. Sidoo kale ku jaquan qalajinta maqarka u dhow meesha qaliinka ee ay istaaban karaan.     Ha isticmaalin wax subkid, jeel, isa, looshin ama wax kale oo aad mariso meesha qalliinka.    Dharkaaga u xiri qaab wanaagsan si aysan cadaadis ugu samayn meesha la qalay  (tusaale ahaan fiiri say u bibiana jiidayso kastuumadaadu)     Haddii aad leedahay Qodabada Latolay, faraha ka qaad tolmada. Iyagaa iskood isaga dhacaya ama waxaad siibi kartaa elder asbuuc kadib.    Waxaad arki kartaa cadad kenan oo kirby cad ah ama binki ah waana iska caadi. U taga bixiyahaaga daryeka caafimaad:    Haddii raadka tolmuhu uu waynaado ama uu ur keeno.    Haddii aad ku yeelato meesha la tolay barar, guduudasho, ama cun cun.    Haddii aad yeelato xanuun kordhaya oo xanuunkaagana aysan daawadu wax ka tarayn.     Haddii aad qabto qandho  100.4  F (38  C) am aka saraysa (heerkulka laga qaaday carabkaaga hoostiisa), oo qarqaryo leh ama  aan lahayn     Meesha u dhow meesha la qalay ([facility] qalliinka) waxaad ka dareemi doontaa inaysan dareen lahayn. Kavin waa caadi. Dareen la aantu waxay u dhamaan doontaa wax kayar sanad.     Gacmahaagga nadiifi:  Markasta dhaqa gacahaaga ka hor inta aadan taaban farjiga agagaarkiisa  iyo meesha la tolay. Kavin waxay caawiniaysaa inuu yaraado infakshanku. Haddii gacmahaagu aysan wasakh lahayn, waxaad isticmaali kartaa aalkalo aad gacmaha ku tirtirto si aad u nadiifiso gacmahaaga. Cidiyahaaga nadiifi ooo jar.    Wac bixiyahaaga daryeelka caafimaaad haddii aad qabtid mid ka  mid ah [facility] sachin socda:    Haddii suufka dhiigga nuuga uu ku buuxsamo 1 saac gudihiis, ama aad aragto xinjiro dhiig ah oo ka wayn kubadda golafka.    Dhiig soconaya wax kabadan 6 asbuuc.    Haddii aad qabto dheecaan farjiga ka imaanaya oo  si xun u uraya.    Jerry, nabar, majiirid daran oo aad ka dareeto qaybta hoose ee ubucda.    Kaadi badan oo dagdag ah oo markasta ku qabanaysa, ama gubasho aad dareento markaad kaajayso.    Lalabbo ama matag.    Guduudasho, barar, ama xanuun aad ka dareento xididada lugta.    Dhibaato kaa haysata naas nuujinta, ama guduudasho ama xanuun naaska ah.    Xabad xanuun iyo qufac ama naqaska oo kugu dhagaya.    Dhibaato ay la socoto murugo, walaac, aa walwal.   Haddii aad zane mc [facility] ladonna wilks.     Haddii aad qabto hector caba.       Birth Discharge Instructions  Activity    Do not lift more than 10 pounds for 6 weeks after surgery. Ask family and friends for help when you need it.    Do not drive while taking pain pills prescribed by your doctor. You may drive if taking over-the-counter pain pills.    No heavy exercise or activity for 6 weeks. Don t do anything that will put a strain on your surgery site.    Don t strain when using the toilet. Your care team may prescribe a stool  softener if you have problems with your bowel movements (constipation).    To care for your incision:    Keep the incision clean and dry    Do not soak your incision in water. No swimming or hot tubs until your incision has fully healed. You may soak in the bathtub if the water level is below your incision.    After washing, dry your incision well. Include the skin that may come in contact with your incision.    Do not use any peroxide, gel, cream, lotion, or ointment on your incision.     Adjust your clothes to avoid pressure on your surgery site (check the elastic in your underwear for example)    If you have Steri-Strips, leave the small strips of tape in place. They will fall off on their own, or you can remove them after one week.    You may see a small amount of clear or pink drainage and this is normal. Check with your health care provider:    If the drainage increases or has an odor.    If you have swelling, redness, or a rash around the incision.    If you have increased pain and your pain medicine doesn t help    If you have a fever of 100.4  F (38  C) or higher (temperature taken under your tongue), with or without chills   The area around your incision (surgery wound) will feel numb. This is normal. The numbness should go away in less than a year.   Keep your hands clean:   Always wash your hands before touching your incision. This helps reduce your risk of infection. If your hands aren t dirty, you may use an alcohol hand-rub to clean your hands. Keep your nails clean and short.     Call your health care provider if you have any of these symptoms:    You soak a sanitary pad with blood within 1 hour, or you see blood clots larger than a golf ball.    Bleeding that lasts more than 6 weeks.    You have vaginal discharge that smells bad.    Severe pain, cramping or tenderness in your lower belly area.    A more frequent or urgent need to urinate (pee), or it burns when you pee.    Nausea and  vomiting.    Redness, swelling or pain around a vein in your leg.    Problems breastfeeding, or a red or painful area on your breast.    If you have chest pain and cough or are gasping for air.    Problems coping with sadness, anxiety, or depression.     If you have any concerns about hurting yourself of the baby, call your doctor right away.      You have questions or concerns after you return home.    IP Diabetes Management Team Discharge Instructions    Glucose Control Regimen:       -  Recommend 6 week postpartum re-check with PCP/CDE for OGTT    -  q3 month A1c given increased risk for return to T2DM  (previously well controlled prior to pregnancy)    -  BG home monitoring of BID -QID rotating times (08/12/16/22)    -  Keep a good journal of BG to have for follow up appointment    -  Hx of Metformin 850 mg daily - will not recommend re-starting at this time.  Will defer to the 6 week follow up as noted above. Will likely require re-initiation given PCOS dx        Blood Glucose Checks: as you have been -> 4 times per day      Endocrinology Outpatient follow up:   [facility] - B[facility]bara Banks - 6 weeks post partum    If you have urgent questions or concerns regarding your blood sugars or insulin, you may contact [phone] (the main hospital ). Ask to speak with the endocrinologist on call.      Thank you for letting the Diabetes Management Team be involved in your care!

## 2021-08-28 NOTE — PROVIDER NOTIFICATION
08/27/21 2330   Fetal Assessment   Fetal HR Assessment Method external US   Fetal HR (beats/min) 135   Fetal Heart Baseline Rate normal range   Fetal HR Variability moderate (amplitude range 6 to 25 bpm)   Fetal HR Accelerations greater than/equal to 15 bpm;lasting at least 15 seconds   Fetal HR Decelerations late;intermittent   FHT with intermittent late decelerations as patient is lying flat on her back. Pt turned to left side with peanut ball in place. Will continue to monitor closely- Dr. Andersen aware.

## 2021-08-28 NOTE — OP NOTE
[facility]  Operative Note     Surgery Date:  2021  Surgeon:  Natalie Schaeffer MD  Assistants:  Scot Urrutia MD MPH, PGY-3    Pre-op Diagnosis:    - Intrauterine pregnancy at 39w1d  - Failure to progress in labor  - Intermittent cat 2 FHT  - Suspected asynclitic fetal presentation  - T2DM  - Chronic hypertension  - Obesity       Post-op Diagnosis:    - Same   - Liveborn male infant     Procedure:    - Primary low-transverse  section with dobule layer uterine closure via pfannenstiel incision    Anesthesia:  Spinal, TAP block  EBL:    800 mL  IVF:    700 mL crystalloid  UOP:    200 mL clear urine at the end of the case  Drains:   Tavares Catheter   Specimens:   Placenta, cord gasses, routine cord blood/segment  Complications:  None     Indications:   Armida Whatley is a 26 year old  at 39w1d admitted for IOL for T2DM and CHTN. She was induced with 1 dose of vaginal misoprostol and a tavares balloon. She SROM'ed after the tavares fell out and was 5cm. The fetal position was suspected to be asynclitic and she made minimal to no change over the next 7 hours. A  section was recommended for failure to progress in labor. The risks, benefits, and alternatives of  section were discussed with the patient, and she agreed to proceed.     Findings:   1. No adhesions  2. Thick but clear amniotic fluid  3. Liveborn male infant in vertex presentation. Born at 0444 on 21. Apgars 9 at 1 minute & 10 at 5 minutes. Weight 7lb 1.9oz.  4. Cord gasses below  5. Normal uterus, fallopian tubes, and ovaries. Prominent sacral promontory     Results for PEDRO WHATLEY (MRN [id number]) as of 2021 06:09   Ref. Range 2021 04:56 2021 04:57   Ph Cord Arterial Latest Ref Range: 7.16 - 7.39   7.24   PCO2 Cord Arterial Latest Ref Range: 35 - 71 mm Hg  50   PO2 Cord Arterial Latest Ref Range: 3 - 33 mm Hg  15   Bicarbonate Cord Arterial Latest Ref Range: 16 - 24 mmol/L  22    Ph Cord Blood Venous Latest Ref Range: 7.21 - 7.45  7.34    PCO2 Cord Venous Latest Ref Range: 27 - 57 mm Hg 40    PO2 Cord Venous Latest Ref Range: 21 - 37 mm Hg 24    Bicarbonate Cord Venous Latest Ref Range: 16 - 24 mmol/L 22        Procedure Details:   The patient was brought to the OR, where adequate spinal anesthesia was administered.  She was placed in the dorsal supine position with a slight leftward tilt. She was prepped and draped in the usual sterile fashion. A traxi was placed for elevation of the panus. A surgical time out was performed. A pfannenstiel skin incision was made with the scalpel, and carried down to the underlying fascia with sharp and blunt dissection. The fascia was incised in the midline, and the incision was extended laterally with the Paredes scissors. The superior aspect of the fascia was grasped with the Kocher clamps and dissected off of the underlying rectus muscles with blunt and sharp dissection. Attention was then turned to the inferior aspect of the fascia, which was similarly dissected off of the underlying rectus muscles. The rectus muscles were  in the midline, and the peritoneum was entered bluntly, and the opening was extended with digital pressure and electrocautery. The bladder blade was placed. A transverse hysterotomy was made with the scalpel in the lower uterine segment, and the incision was extended with digital pressure. The infant was noted to be vertex, and was delivered atraumatically. The shoulders delivered easily. Single loose nuchal cord, reduced after delivery. The cord was doubly clamped and cut after 60 seconds, and the infant was handed off to the awaiting NICU staff. A segment of cord was cut and sent to lab. The placenta was delivered with gentle traction on the umbilical cord and uterine massage. The uterus was exteriorized and cleared of all clots and debris. Uterine tone was noted to be firm with 30 units of pitocin given through the running  IV and uterine massage.  The hysterotomy was closed with a running locked suture of 0 Vicryl.  The hysterotomy was then imbricated using an 0 Monocryl suture. The hysterotomy was noted to be hemostatic. The posterior cul-de-sac was cleared of all clots and debris. The uterus was returned to the abdomen. The pericolic gutters were cleared of all clots and debris. The hysterotomy was reexamined and noted to be hemostatic. The fascia and rectus muscles were examined and areas of oozing were controlled with electrocautery. The fascia was closed with a running 0 Vicryl suture. The subcutaneous tissue was irrigated and areas of oozing were controlled with electrocautery. The subcutaneous tissue was greater than 2 cm in thickness, and was therefore closed with a running 2-0 Plain suture. The skin was closed with a running subcuticular 4-0 Monocryl suture and covered with a sterile pressure dressing.    All sponge, needle, and instrument counts were correct. The patient tolerated the procedure well, and was transferred to recovery in stable condition. Dr. Schaeffer was present and scrubbed for the entirety of the procedure.     Scot Urrutia MD MPH  OB/Gyn PGY-3  08/28/21 5:53 AM    Staff:  I was scrubbed and present for entire case and agree w/ above note.    Natalie Schaeffer MD

## 2021-08-28 NOTE — ANESTHESIA PROCEDURE NOTES
TAP Procedure Note  Pre-Procedure   Staff -        Anesthesiologist:  Bijan Rios MD       Performed By: anesthesiologist       Location: OR       Procedure Start/Stop Times: 8/28/2021 5:43 AM and 8/28/2021 5:48 AM       Pre-Anesthestic Checklist: patient identified, IV checked, site marked, risks and benefits discussed, informed consent, monitors and equipment checked, pre-op evaluation, at physician/surgeon's request and post-op pain management  Timeout:       Correct Patient: Yes        Correct Procedure: Yes        Correct Site: Yes        Correct Position: Yes        Correct Laterality: Yes        Site Marked: Yes  Procedure Documentation  Procedure: TAP       Diagnosis: C/S       Laterality: bilateral       Patient Position: supine       Patient Prep/Sterile Barriers: sterile gloves, mask       Skin prep: Chloraprep       Needle Type: short bevel       Needle Gauge: 21.        Needle Length (millimeters): 110        Ultrasound guided       1. Ultrasound was used to identify targeted nerve, plexus, vascular marker, or fascial plane and place a needle adjacent to it in real-time.       2. Ultrasound was used to visualize the spread of anesthetic in close proximity to the above referenced structure.       3. A permanent image is entered into the patient's record.       4. The visualized anatomic structures appeared normal.       5. There were no apparent abnormal pathologic findings.    Assessment/Narrative         The placement was negative for: blood aspirated, painful injection and site bleeding       Paresthesias: No.     Bolus given via needle..        Secured via.        Insertion/Infusion Method: Single Shot       Complications: none       Injection made incrementally with aspirations every 5 mL.    Comments:  Verbal consent from patient and spouse obtained prior to c/s via .

## 2021-08-28 NOTE — CONSULTS
"In-Patient Diabetes/Hyperglycemia Management Consult    Chief Complaint:  \"To determine transition plan for glycemic management from labor to postpartum\".    Consult requested by: Osman Urrutia MD    All information gathered via chart review and face-to-face interview and assessment    History of Present Illness:  Armida Da Silva is a 26 year old  at 39w1d here for IOL for CHTN and T2DM.    Diabetes:  Patient with a Hx of pre-diabetes, PCOS and obesity diagnosed 2020 with an A1c elevation of 6.4%.  She was started on Metformin 850 mg daily.  Throughout her pregnancy she has been diet and exercise controlled without the need for medications and has been following with a CDE per notes however patient reports she has been taking Metformin every day during her pregnancy.       She did meet with dietitian at time of diagnosis per chart review.     Patient was complaint with her diet and exercise recommendations as well as the Metformin regimen and repeat A1c check returned to WNL  At 5.2% on 2020.     Last seen by diabetes educator 2021 - at that visit:    BG/ketone trends:  Blood Glucose/Ketone Log:     Date Ketones Fasting Post Breakfast Post Lunch Post Supper      73 105 110 114      77 121 107 115      81 109 114 116      76 123 95 102      71 105 100 123      72 108 112 105      79 110          Assessment:     Ketones: not testing.   Fasting blood glucoses: 100% in target.  After breakfast: 100% in target.  After lunch: 100% in target.  After dinner: 100% in target.    Today:      Labs:  Unrevealing  Covid - negative    CBC RESULTS: Recent Labs   Lab Test 21  0915   WBC 7.6   RBC 4.44   HGB 12.7   HCT 37.0   MCV 83   MCH 28.6   MCHC 34.3   RDW 13.6          BG at time of consult - 128 -112    Current BG trends:    Patient reports today she has been checking BG per CDE recommendations of QID, taking daily AM metformin to manage BG.  Patient clarifies " following the initiation of diet/exercise and Metformin, it was recommended she could stop the Metformin when repeat A1c was much improved until it was discovered she was pregnant, she then continued the Metformin throughout pregnancy.    Patient eating well, denies nausea or vomiting.  Is breastfeeding. Pain moderately controlled.     Confounding factors:  No steroids, regular diet, no dextrose fluids currently running - did have Ancef in Dextrose.    ELOS:  TBD    Diabetes:  Recent Labs   Lab 08/28/21  0756 08/28/21  0341 08/28/21  0237 08/28/21  0145 08/28/21  0035 08/27/21  2327   * 128* 107* 116* 112* 106*     Diabetes Type:   Pre-diabetes and PCOS  Pre-gestational diabetes    Diabetes Duration: diagnosed 9/23/2020 with elevated A1c of 6.4%  Repeat A1c 12/9/2020 5.2%     Usual Diabetes Regimen:     Medications: Metformin 850 mg daily with excellent results    BG monitoring frequency:  QID    Diet: met with dietician, increased vegetables  Initial wt loss of 10 lbs after DM diagnosis and prior to pregnancy    Exercise: increased exercise - walking, riding stationary bike, going to [employer]    Ability to Duck Prescribed Regimen: able to monitor BG regularly, compliant with Metformin    Diabetes Control:   Lab Results   Component Value Date    A1C 5.2 07/28/2021    A1C 5.6 02/05/2021     Diabetes Complications: No retinopathy, last dilated eye exam within the year, no peripheral neuropathy, no nephropathy    History of DKA:   No   Able to Detect Hypoglycemia: N/A  Usual Diabetes Care Provider: BRONWYN Mcmahon [facility]    Factors Impacting Glucose Control:  Delivery/postpartum    Review of Systems:    CC:  Some mild pain to abd  Constitutional:   No fever, no chills  ENT/Mouth:   No hearing changes, no ear pain, no sore throat, no rhinorrhea, no difficulty swallowing  Eyes:  No eye pain, no discharge, no vision changes  CV:   No CP/SOB, no new edema  Resp:  No cough, no wheezing  GI:   No  nausea, no vomiting, denies constipation, no diarrhea  :  Cath  Musk:  No joint swelling/pain, No back pain  Skin/heme:   No new rashes/bruises/open areas.  No pruritis  Neuro:   No new weakness, denies numbness/tingling, no headache  Psych:   No new anxiety, denies depression  Endocrine:  No polyuria, no polydipsia    Past medical, family and social histories are reviewed and updated.    Past Medical History  Past Medical History:   Diagnosis Date     Obesity, BMI 38      PCOS (polycystic ovarian syndrome)      Pre-diabetes        Family History  Family History   Problem Relation Age of Onset     Diabetes No family hx of      Hyperlipidemia No family hx of      Hypertension No family hx of      Cerebrovascular Disease No family hx of      Coronary Artery Disease No family hx of      Depression No family hx of      Anxiety Disorder No family hx of        Social History  Social History     Socioeconomic History     Marital status:      Spouse name: Claudia Yepez     Number of children: Not on file     Years of education: Not on file     Highest education level: Not on file   Occupational History     Not on file   Tobacco Use     Smoking status: Never Smoker     Smokeless tobacco: Never Used   Substance and Sexual Activity     Alcohol use: Never     Drug use: Never     Sexual activity: Yes     Partners: Male     Birth control/protection: None   Other Topics Concern     Not on file   Social History Narrative     Not on file     Social Determinants of Health     Financial Resource Strain:      Difficulty of Paying Living Expenses:    Food Insecurity:      Worried About Running Out of Food in the Last Year:      Ran Out of Food in the Last Year:    Transportation Needs:      Lack of Transportation (Medical):      Lack of Transportation (Non-Medical):    Physical Activity:      Days of Exercise per Week:      Minutes of Exercise per Session:    Stress:      Feeling of Stress :    Social Connections:       "Frequency of Communication with Friends and Family:      Frequency of Social Gatherings with Friends and Family:      Attends [employer] Services:      Active Member of Clubs or Organizations:      Attends Club or Organization Meetings:      Marital Status:    Intimate Partner Violence:      Fear of Current or Ex-Partner:      Emotionally Abused:      Physically Abused:      Sexually Abused:          Physical Exam:  /60   Pulse 84   Temp 98.2  F (36.8  C) (Oral)   Resp 18   Ht 1.676 m (5' 6\")   Wt 110.2 kg (243 lb)   LMP 11/27/2020   SpO2 99%   Breastfeeding Unknown   BMI 39.22 kg/m        General:  Pleasant, resting in bed, in no distress.   HEENT: NC/AT, PER and anicteric, non-injected, oral mucous membranes moist.   Lungs: non-labored, no cough, no SOB  ABD: rounded, soft  Skin: warm and dry, no obvious lesions  Feet:  CMS intact - edematous  MSK:  fluid movement of all extremities  Lymp: + LE edema   Mental status:  alert, oriented x3, communicating clearly  Psych:  calm, appropriate mood, full/bright affect    Laboratory  Recent Labs   Lab Test 08/28/21  0756 08/28/21  0341 02/19/21  1615   NA  --   --  138   POTASSIUM  --   --  3.5   CHLORIDE  --   --  107   CO2  --   --  20   ANIONGAP  --   --  11   * 128* 96   BUN  --   --  5*   CR  --   --  0.32*   TORI  --   --  9.0     CBC RESULTS:   Recent Labs   Lab Test 08/27/21  0915   WBC 7.6   RBC 4.44   HGB 12.7   HCT 37.0   MCV 83   MCH 28.6   MCHC 34.3   RDW 13.6          Liver Function Studies -   Recent Labs   Lab Test 02/19/21  1615   AST 20   ALT 22       Active Medications  Current Facility-Administered Medications   Medication     acetaminophen (TYLENOL) tablet 650 mg     acetaminophen (TYLENOL) tablet 975 mg     carboprost (HEMABATE) injection 250 mcg     glucose gel 15-30 g    Or     dextrose 50 % injection 25-50 mL    Or     glucagon injection 1 mg     fentaNYL (PF) (SUBLIMAZE) injection 100 mcg     fentaNYL (PF) (SUBLIMAZE) " injection  mcg     fentaNYL (SUBLIMAZE) 2 mcg/mL, bupivacaine (MARCAINE) 0.125% in NS premix for PCEA     hydrOXYzine (ATARAX) tablet 50 mg     ketorolac (TORADOL) injection 30 mg    Or     ketorolac (TORADOL) injection 30 mg    Or     ibuprofen (ADVIL/MOTRIN) tablet 600 mg     IF subcutaneous (SQ) Unfractionated heparin (UFH) ordered for thromboprophylaxis    Or     IF intravenous (IV) Unfractionated heparin (UFH) ordered    Or     IF LOW-dose Low molecular weight heparin (LMWH) thromboprophylaxis ordered    Or     IF HIGHER-dose Low molecular weight heparin (LMWH) thromboprophylaxis ordered     insulin aspart (NovoLOG) injection (RAPID ACTING)     insulin aspart (NovoLOG) injection (RAPID ACTING)     insulin aspart (NovoLOG) injection (RAPID ACTING)     insulin regular (HumuLIN R,NovoLIN R) infusion (1 unit/mL) ADULT/PEDS     lactated ringers BOLUS 250 mL     lactated ringers infusion     lidocaine 1 % 0.1-20 mL     medication instruction     Medication Instructions - cervical ripening and induction medications     meperidine (DEMEROL) injection 12.5 mg     methylergonovine (METHERGINE) injection 200 mcg     metoclopramide (REGLAN) injection 10 mg    Or     metoclopramide (REGLAN) tablet 10 mg     misoprostol (cervical ripening) (CYTOTEC) quarter-tab 25 mcg     misoprostol (CYTOTEC) tablet 400 mcg    Or     misoprostol (CYTOTEC) tablet 800 mcg     Morphine Sulfate (PF) injection 10 mg     nalbuphine (NUBAIN) injection 2.5-5 mg     naloxone (NARCAN) injection 0.2 mg    Or     naloxone (NARCAN) injection 0.4 mg    Or     naloxone (NARCAN) injection 0.2 mg    Or     naloxone (NARCAN) injection 0.4 mg     nitrous oxide/oxygen 50/50 blend     ondansetron (ZOFRAN-ODT) ODT tab 4 mg    Or     ondansetron (ZOFRAN) injection 4 mg     ondansetron (ZOFRAN-ODT) ODT tab 4 mg    Or     ondansetron (ZOFRAN) injection 4 mg     Opioid plan postpartum - medication instruction     oxytocin (PITOCIN) 30 units in 500 mL 0.9% NaCl  infusion     oxytocin (PITOCIN) 30 units in 500 mL 0.9% NaCl infusion     oxytocin (PITOCIN) 30 units in 500 mL 0.9% NaCl infusion     oxytocin (PITOCIN) injection 10 Units     oxytocin (PITOCIN) injection 10 Units     phenylephrine (NOAH-SYNEPHRINE) injection 100 mcg     prochlorperazine (COMPAZINE) injection 5 mg    Or     prochlorperazine (COMPAZINE) injection 10 mg     prochlorperazine (COMPAZINE) injection 10 mg    Or     prochlorperazine (COMPAZINE) tablet 10 mg    Or     prochlorperazine (COMPAZINE) suppository 25 mg     sodium chloride 0.9% infusion     tranexamic acid 1 g in 100 mL 0.7% NaCl IV bag (premix)     No current outpatient medications on file.       Current Diet  Orders Placed This Encounter      Regular Diet Adult    Assessment:    1)  Pre-existing well-controlled pre-diabetes prior to gestation;   A1c 5.2% (less reliable in setting of pregnancy) - will need repeat in 3 months  2)  Post partum s/p   3)  Obesity; BMI 39  4)  PCOS    Plan:     -   Medium resistance sliding scale insulin TID AC/HS    -  BG monitoring q4h then TID AC, HS and 0200 once pattern shown stable    -  Hypoglycemia protocol    Outpatient recs - pasted in discharge frederick:      -  Recommend 6 week postpartum re-check with PCP/CDE for OGTT    -  q3 month A1c given increased risk for return to T2DM  (previously well controlled prior to pregnancy)    -  BG home monitoring of BID -QID rotating times (/)    -  Keep a good journal of BG to have for follow up appointment    -  Hx of Metformin 850 mg daily - will not recommend re-starting at this time.  Will defer to the 6 week follow up as noted above. Will likely require re-initiation given PCOS dx        Thank you for the consult, IDS to sign off - recs placed in Discharge Navigator      DEEPIKA Howell CNP   Inpatient Diabetes Management Service  Pager - [phone]  Friday - Monday 0800 - 1600 hrs  Diabetes Management Team job code pager for on-call resident  after hours of 1600: [phone]       I spent a total of 80 minutes bedside and on the inpatient unit managing glycemic care.  Over 50% of my time on the unit was spent counseling the patient and/or coordinating care regarding acute hyperglycemic management.  See note for details.

## 2021-08-29 LAB
GLUCOSE BLDC GLUCOMTR-MCNC: 104 MG/DL (ref 70–99)
GLUCOSE BLDC GLUCOMTR-MCNC: 123 MG/DL (ref 70–99)
GLUCOSE BLDC GLUCOMTR-MCNC: 69 MG/DL (ref 70–99)
GLUCOSE BLDC GLUCOMTR-MCNC: 71 MG/DL (ref 70–99)
GLUCOSE BLDC GLUCOMTR-MCNC: 79 MG/DL (ref 70–99)
GLUCOSE BLDC GLUCOMTR-MCNC: 87 MG/DL (ref 70–99)
HGB BLD-MCNC: 9.9 G/DL (ref 11.7–15.7)

## 2021-08-29 PROCEDURE — 36415 COLL VENOUS BLD VENIPUNCTURE: CPT | Performed by: STUDENT IN AN ORGANIZED HEALTH CARE EDUCATION/TRAINING PROGRAM

## 2021-08-29 PROCEDURE — 250N000013 HC RX MED GY IP 250 OP 250 PS 637: Performed by: STUDENT IN AN ORGANIZED HEALTH CARE EDUCATION/TRAINING PROGRAM

## 2021-08-29 PROCEDURE — 59510 CESAREAN DELIVERY: CPT | Mod: GC | Performed by: OBSTETRICS & GYNECOLOGY

## 2021-08-29 PROCEDURE — 250N000011 HC RX IP 250 OP 636: Performed by: STUDENT IN AN ORGANIZED HEALTH CARE EDUCATION/TRAINING PROGRAM

## 2021-08-29 PROCEDURE — 85018 HEMOGLOBIN: CPT | Performed by: STUDENT IN AN ORGANIZED HEALTH CARE EDUCATION/TRAINING PROGRAM

## 2021-08-29 PROCEDURE — 120N000002 HC R&B MED SURG/OB UMMC

## 2021-08-29 RX ORDER — IBUPROFEN 600 MG/1
600 TABLET, FILM COATED ORAL EVERY 6 HOURS PRN
Qty: 60 TABLET | Refills: 0 | Status: SHIPPED | OUTPATIENT
Start: 2021-08-29 | End: 2022-01-27

## 2021-08-29 RX ORDER — AMOXICILLIN 250 MG
1 CAPSULE ORAL DAILY
Qty: 100 TABLET | Refills: 0 | Status: SHIPPED | OUTPATIENT
Start: 2021-08-29 | End: 2022-01-27

## 2021-08-29 RX ORDER — FERROUS SULFATE 325(65) MG
325 TABLET ORAL
Qty: 30 TABLET | Refills: 0 | Status: SHIPPED | OUTPATIENT
Start: 2021-08-29 | End: 2022-01-27

## 2021-08-29 RX ORDER — OXYCODONE HYDROCHLORIDE 5 MG/1
5 TABLET ORAL EVERY 6 HOURS PRN
Qty: 5 TABLET | Refills: 0 | Status: SHIPPED | OUTPATIENT
Start: 2021-08-29 | End: 2021-09-01

## 2021-08-29 RX ADMIN — ACETAMINOPHEN 975 MG: 325 TABLET, FILM COATED ORAL at 23:03

## 2021-08-29 RX ADMIN — ACETAMINOPHEN 975 MG: 325 TABLET, FILM COATED ORAL at 03:05

## 2021-08-29 RX ADMIN — ACETAMINOPHEN 975 MG: 325 TABLET, FILM COATED ORAL at 16:12

## 2021-08-29 RX ADMIN — IBUPROFEN 800 MG: 800 TABLET ORAL at 10:20

## 2021-08-29 RX ADMIN — IBUPROFEN 800 MG: 800 TABLET ORAL at 16:13

## 2021-08-29 RX ADMIN — ACETAMINOPHEN 975 MG: 325 TABLET, FILM COATED ORAL at 10:20

## 2021-08-29 RX ADMIN — ENOXAPARIN SODIUM 40 MG: 40 INJECTION SUBCUTANEOUS at 03:25

## 2021-08-29 RX ADMIN — IBUPROFEN 800 MG: 800 TABLET ORAL at 22:58

## 2021-08-29 RX ADMIN — KETOROLAC TROMETHAMINE 30 MG: 30 INJECTION, SOLUTION INTRAMUSCULAR; INTRAVENOUS at 03:05

## 2021-08-29 RX ADMIN — DOCUSATE SODIUM AND SENNOSIDES 1 TABLET: 8.6; 5 TABLET ORAL at 10:19

## 2021-08-29 RX ADMIN — SENNOSIDES AND DOCUSATE SODIUM 2 TABLET: 8.6; 5 TABLET ORAL at 22:58

## 2021-08-29 NOTE — PLAN OF CARE
Data: VSS and postpartum checks WNL. Patient eating and drinking normally. Patient able to empty bladder independently and up ambulating. Patient performing self care and and able to care for infant.Fundus at U and at midline , lochia scant, no blood clots. Dressing clean, dry and intact.   Action: Patient taking Toradol, and Tylenol for pain and pain tolerable. Encouraged patient to breast feed every 2 - 3 hours and to monitor for cues to feed infant. Patient education done( education record).   Response: Patient participating in infant's care with pain management. Positive attachment with infant observed with infant. Support/ spouse present at bedside and attentive to infant and patient.   Plan: Continue with the plan of cares.

## 2021-08-29 NOTE — PLAN OF CARE
Data: Vital signs within normal limits. Postpartum checks within normal limits - see flow record. Patient eating and drinking normally. Patient able to empty bladder independently and is up ambulating. No apparent signs of infection. Incision healing well. Patient performing self cares and is able to care for infant.  Action: Patient medicated during the shift for pain. See MAR. Patient reassessed within 1 hour after each medication and pain was improved - patient stated she was comfortable. Patient education done about pain control, incision care, breastfeeding positioning and latching. See flow record.  Response: Positive attachment behaviors observed with infant. Support persons are present.   Plan: Anticipate discharge on POD 2-3.

## 2021-08-29 NOTE — PROGRESS NOTES
[facility] Obstetrics Post-Op / Progress Note         Assessment and Plan:    Assessment:   Post-operative day #1  Low transverse primary  section  L&D complications: Failure to progress in setting of intermittent category 2 FHT.       Doing well.  Clean wound without signs of infection.  Normal healing wound.  No immediate surgical complications identified.  No excessive bleeding  Pain well-controlled.      Plan:   Ambulation encouraged  Breast feeding strategies discussed  Lactation consultation  Monitor wound for signs of infection  Pain control measures as needed  Reportable signs and symptoms dicussed with the patient  Anticipate discharge likely tomorrow  Encourage water and stool softener     -   Medium resistance sliding scale insulin TID AC/HS    -  BG monitoring q4h then TID AC, HS and 0200 once pattern shown stable    -  Hypoglycemia protocol     Outpatient recs - pasted in discharge frederick:       -  Recommend 6 week postpartum re-check with PCP/CDE for OGTT    -  q3 month A1c given increased risk for return to T2DM  (previously well controlled prior to pregnancy)    -  BG home monitoring of BID -QID rotating times (/)    -  Keep a good journal of BG to have for follow up appointment    -  Hx of Metformin 850 mg daily - will not recommend re-starting at this time.  Will defer to the 6 week follow up as noted above. Will likely require re-initiation given PCOS dx              Interval History:   Doing well.  Pain is well-controlled.  No fevers.  No history of wound drainage, warmth or significant erythema.  Good appetite.  Denies chest pain, shortness of breath, nausea or vomiting.  Ambulatory.  Breastfeeding well. Awaiting BM.           Significant Problems:    None          Review of Systems:    CONSTITUTIONAL: NEGATIVE for fever, chills  ENT/MOUTH: NEGATIVE for ear, mouth and throat problems  RESP: NEGATIVE for significant cough or SOB  CV: NEGATIVE for chest pain,  palpitations or peripheral edema          Medications:     All medications related to the patient's surgery have been reviewed  Current Facility-Administered Medications   Medication     acetaminophen (TYLENOL) tablet 975 mg     [START ON 8/30/2021] bisacodyl (DULCOLAX) Suppository 10 mg     carboprost (HEMABATE) injection 250 mcg     dextrose 5% in lactated ringers infusion     glucose gel 15-30 g    Or     dextrose 50 % injection 25-50 mL    Or     glucagon injection 1 mg     diphenhydrAMINE (BENADRYL) capsule 25 mg    Or     diphenhydrAMINE (BENADRYL) injection 25 mg     enoxaparin ANTICOAGULANT (LOVENOX) injection 40 mg     fentaNYL (PF) (SUBLIMAZE) injection 100 mcg     hydrocortisone 2.5 % cream     ketorolac (TORADOL) injection 30 mg    Or     ketorolac (TORADOL) injection 30 mg    Or     ibuprofen (ADVIL/MOTRIN) tablet 600 mg     ibuprofen (ADVIL/MOTRIN) tablet 800 mg     IF subcutaneous (SQ) Unfractionated heparin (UFH) ordered for thromboprophylaxis    Or     IF intravenous (IV) Unfractionated heparin (UFH) ordered    Or     IF LOW-dose Low molecular weight heparin (LMWH) thromboprophylaxis ordered    Or     IF HIGHER-dose Low molecular weight heparin (LMWH) thromboprophylaxis ordered     insulin aspart (NovoLOG) injection (RAPID ACTING)     insulin aspart (NovoLOG) injection (RAPID ACTING)     ketorolac (TORADOL) injection 30 mg     lactated ringers infusion     lanolin cream     lidocaine (LMX4) cream     lidocaine 1 % 0.1-1 mL     lidocaine 1 % 0.1-20 mL     magnesium hydroxide (MILK OF MAGNESIA) suspension 30 mL     meperidine (DEMEROL) injection 12.5 mg     metoclopramide (REGLAN) injection 10 mg    Or     metoclopramide (REGLAN) tablet 10 mg     misoprostol (CYTOTEC) tablet 400 mcg    Or     misoprostol (CYTOTEC) tablet 800 mcg     nalbuphine (NUBAIN) injection 2.5-5 mg     naloxone (NARCAN) injection 0.2 mg    Or     naloxone (NARCAN) injection 0.4 mg    Or     naloxone (NARCAN) injection 0.2 mg     Or     naloxone (NARCAN) injection 0.4 mg     No MMR Needed -  Assessment: Patient does not need MMR vaccine     No Tdap Needed - Assessment: Patient does not need Tdap vaccine     ondansetron (ZOFRAN-ODT) ODT tab 4 mg    Or     ondansetron (ZOFRAN) injection 4 mg     ondansetron (ZOFRAN-ODT) ODT tab 4 mg    Or     ondansetron (ZOFRAN) injection 4 mg     Opioid plan postpartum - medication instruction     oxyCODONE (ROXICODONE) tablet 5 mg     oxytocin (PITOCIN) 30 units in 500 mL 0.9% NaCl infusion     oxytocin (PITOCIN) 30 units in 500 mL 0.9% NaCl infusion     oxytocin (PITOCIN) 30 units in 500 mL 0.9% NaCl infusion     oxytocin (PITOCIN) injection 10 Units     oxytocin (PITOCIN) injection 10 Units     prochlorperazine (COMPAZINE) injection 5 mg    Or     prochlorperazine (COMPAZINE) injection 10 mg     prochlorperazine (COMPAZINE) injection 10 mg    Or     prochlorperazine (COMPAZINE) tablet 10 mg    Or     prochlorperazine (COMPAZINE) suppository 25 mg     senna-docusate (SENOKOT-S/PERICOLACE) 8.6-50 MG per tablet 1 tablet    Or     senna-docusate (SENOKOT-S/PERICOLACE) 8.6-50 MG per tablet 2 tablet     simethicone (MYLICON) chewable tablet 80 mg     sodium chloride (PF) 0.9% PF flush 3 mL     sodium chloride (PF) 0.9% PF flush 3 mL     [START ON 8/30/2021] sodium phosphate (FLEET ENEMA) 1 enema     tranexamic acid 1 g in 100 mL 0.7% NaCl IV bag (premix)             Physical Exam:     All vitals stable  Patient Vitals for the past 24 hrs:   BP Temp Temp src Pulse Resp SpO2   08/29/21 0900 109/77 98.2  F (36.8  C) Oral 85 16 99 %   08/29/21 0500 101/60 97  F (36.1  C) Oral 76 16 100 %   08/29/21 0144 108/61 97.8  F (36.6  C) Oral 69 16 100 %   08/28/21 2100 120/79 98  F (36.7  C) Oral 82 16 100 %   08/28/21 1515 105/49 98.4  F (36.9  C) Oral 74 16 100 %   08/28/21 1130 124/81 98.4  F (36.9  C) Oral 84 16 100 %   08/28/21 1020 120/80 98.5  F (36.9  C) Oral 86 16 100 %     Chest / Breast:   Breasts symmetrical,  skin without lesion(s), no nipple retraction or dimpling, no nipple discharge, no masses palpated, no axillary or supraclavicular adenopathy     Abdomen:   Incision CDI. Normal bowel sounds, soft, non-distended, non-tender, no masses palpated, no hepatosplenomegally            Data:     Hemoglobin   Date Value Ref Range Status   08/27/2021 12.7 11.7 - 15.7 g/dL Final   07/28/2021 12.7 11.7 - 15.7 g/dL Final   02/19/2021 12.7 11.7 - 15.7 g/dL Final   02/05/2021 13.7 11.7 - 15.7 g/dL Final     No imaging studies have been ordered    Natalie Schaeffer MD

## 2021-08-30 VITALS
BODY MASS INDEX: 37.48 KG/M2 | HEIGHT: 66 IN | SYSTOLIC BLOOD PRESSURE: 128 MMHG | WEIGHT: 233.2 LBS | RESPIRATION RATE: 18 BRPM | TEMPERATURE: 98.2 F | OXYGEN SATURATION: 99 % | DIASTOLIC BLOOD PRESSURE: 69 MMHG | HEART RATE: 69 BPM

## 2021-08-30 LAB
GLUCOSE BLDC GLUCOMTR-MCNC: 73 MG/DL (ref 70–99)
GLUCOSE BLDC GLUCOMTR-MCNC: 77 MG/DL (ref 70–99)

## 2021-08-30 PROCEDURE — 250N000013 HC RX MED GY IP 250 OP 250 PS 637: Performed by: STUDENT IN AN ORGANIZED HEALTH CARE EDUCATION/TRAINING PROGRAM

## 2021-08-30 PROCEDURE — 250N000011 HC RX IP 250 OP 636: Performed by: STUDENT IN AN ORGANIZED HEALTH CARE EDUCATION/TRAINING PROGRAM

## 2021-08-30 RX ADMIN — SENNOSIDES AND DOCUSATE SODIUM 2 TABLET: 8.6; 5 TABLET ORAL at 07:48

## 2021-08-30 RX ADMIN — ACETAMINOPHEN 975 MG: 325 TABLET, FILM COATED ORAL at 04:46

## 2021-08-30 RX ADMIN — IBUPROFEN 800 MG: 800 TABLET ORAL at 11:19

## 2021-08-30 RX ADMIN — ENOXAPARIN SODIUM 40 MG: 40 INJECTION SUBCUTANEOUS at 04:46

## 2021-08-30 RX ADMIN — ACETAMINOPHEN 975 MG: 325 TABLET, FILM COATED ORAL at 11:19

## 2021-08-30 RX ADMIN — IBUPROFEN 800 MG: 800 TABLET ORAL at 04:47

## 2021-08-30 ASSESSMENT — MIFFLIN-ST. JEOR: SCORE: 1824.54

## 2021-08-30 NOTE — PLAN OF CARE
[id number] (charge RN took over care from [phone] while this writer was pulled to urgent situation):    VSS and postpartum assessments WDL.  Up ad ramya with steady gait and independent with cares.  Bonding well with infant.  Breastfeeding on cue with some assist.  Pain managed with tylenol and ibuprofen per MAR.  Incisional steristrips intact, interdry applied.  Blood sugars =71 (before breakfast), 69 (before lunch) and 123 (after lunch), no insulin needed.  , Jose Antonio present and supportive.  Will continue with postpartum cares and education per plan of care.

## 2021-08-30 NOTE — PLAN OF CARE
Data: VSS and postpartum checks WNL. Patient eating and drinking normally. Patient able to empty bladder independently and up ambulating. Patient performing self care and and able to care for infant.Fundus at  U, lochia scant, no blood clots.   Action: Patient taking Tylenol and ibuprofen for pain and pain tolerable. Encouraged patient to breast feed every 2 - 3 hours and to monitor for cues to feed infant. Patient education done( education record). WT today was 233.2lb.  Response: Patient participating in infant's care with pain management. Positive attachment with infant observed with infant. Support/ spouse present at bedside and attentive to infant and patient.   Plan: Continue with the plan of cares.

## 2021-08-30 NOTE — PROGRESS NOTES
OB Postpartum Progress Note    S: Feeling well this morning. Pain well controlled. Tolerating regular diet without nausea or emesis. Voiding spontaneously without difficulty. Passing flatus, no BM. Ambulating without dizziness or lightheadedness. Breastfeeding. Lochia similar to menses.    Denies headache, vision changes, CP, SOB, RUQ pain, increased edema.       O:  Patient Vitals for the past 24 hrs:   BP Temp Temp src Pulse Resp SpO2 Weight   21 0600 -- -- -- -- -- -- 105.8 kg (233 lb 3.2 oz)   21 0300 129/79 98  F (36.7  C) Oral 90 18 -- --   21 2008 120/71 98.5  F (36.9  C) Oral 80 20 -- --   21 1600 129/69 98.1  F (36.7  C) Oral 97 18 -- --   21 0900 109/77 98.2  F (36.8  C) Oral 85 16 99 % --       Gen: NAD  CV: Regular rate  Resp: Non-labored breathing on room air  Abd: Soft, nondistended, appropriately tender, fundus firm below the umbilicus and nontender  Inc: C/D/I   Ext: 1+ lower extremity edema bilaterally, calves non-tender    UOP: not recorded  Weight: downtrending  Vitals:    21 0850 21 0600   Weight: 110.2 kg (243 lb) 105.8 kg (233 lb 3.2 oz)         Labs:   Hemoglobin   Date Value Ref Range Status   2021 9.9 (L) 11.7 - 15.7 g/dL Final   2021 12.7 11.7 - 15.7 g/dL Final   2021 12.7 11.7 - 15.7 g/dL Final   2021 13.7 11.7 - 15.7 g/dL Final     Recent Labs   Lab 21  0744 21  0556 21  2245 21  2031 21  1508 21  1418   GLC 73 77 104* 87 123* 69*         A/P: Armida Da Silva is a 26 year old  who is POD#2 s/p PLTCS for arrest of dilation. Pregnancy notable for Type 2 DM, chronic HTN and obesity. Doing well postpartum.    # Postpartum/Postop  - Pain: Continue scheduled acetaminophen, scheduled Toradol x4 doses > ibuprofen, and prn oxycodone.  - Heme: Appropriate blood loss during surgery. No s/s of ongoing blood loss. POD#1 Hgb 9.9.   - GI: PRN simethicone QID. PRN bowel regimen, anti-emetics  - :  S/p tavares, no issues  - PNC: Rh positive, Rubella immune. No interventions indicated.   - Breastfeeding; lactation following  - Contraception: Desires to discuss with  and decide by outpatient visit. Discussed recommended pregnancy spacing of 18 months.   - PPx: Encourage ambulation, IS, SCDs while confined to bed    # Chronic HTN: Blood pressures have been normal since delivery not on medication.   - 1 week blood pressure check     # Type 2 DM: S/p endocrine evaluation. Did not take insulin in pregnancy and BG have been within goal.   - 6 week postpartum GTT and d1yynbn A1c, per endocrine recommendation   - Discontinue metformin, defer until follow up       Dispo: Anticipate discharge home today; once meeting postpartum discharge goals     Carmen Khalil MD  ObGyn Resident, PGY-2  08/30/2021 8:03 AM

## 2021-08-30 NOTE — PLAN OF CARE
Pt discharged to home with baby.   Instructions & prescriptions given reviewed.   ID bands double checked. All her questions/concerns addressed.   Pt verbalized understanding her plan & had no further questions.   Instructed to follow up at clinic within 1 week for BP check & 6 weeks for PP check.     As of 09:44 AM, Waiting pharmacy to send discharge meds, Pt declined to get Oxycodone stating she did not like the effects.   [facility]   & Waiting to Peds to discharge baby.

## 2021-08-30 NOTE — LACTATION NOTE
This note was copied from a baby's chart.  Consult for: First time breastfeeding    Delivery Information: Baby Pedro was born at 39.1 weeks via vaginal delivery on 21 at 0444.    Maternal Health History: Armida has a history of pre-diabetes and obesity. She noted breast growth and sensitivity in early pregnancy. Her breasts are soft and symmetrical with bilateral intact, everted nipples. ?    Infant information:  Baby Pedro has age appropriate output. His weight loss at 48 hours of age was -8.8% of his birthweight at 6lb 7.9 oz. ?    Feeding Assessment: Armida had baby Pedro latched to the right breast in the football position when I entered the room. H    Education: early feeding cues, benefits of feeding on cue, satiety cues, expected  output,  weight loss,  benefits of skin to skin, the Second Night, benefits of breast massage and hand expression of colostrum, Infant Feeding Log handout, inpatient lactation support and outpatient lactation resources.     Plan: Continue breastfeeding on cue with RN support as needed with a goal of 8-12 feedings per day. Encourage frequent skin to skin, breast massage and hand expression.     Family is discharging to home today. They plan to follow up with [facility] and were encouraged to follow up with LCs there for continued support as needed.

## 2021-08-30 NOTE — PLAN OF CARE
Data: Vital signs within normal limits. Postpartum checks within normal limits - see flow record. Patient eating and drinking normally. Patient able to empty bladder independently and is up ambulating. No apparent signs of infection. Incision healing well. Patient performing self cares and is able to care for infant. Breastfeeding on demand, latch checked.   Action: Pain has been adequately managed oral medications. BG checks WNL, no insulin needed.   Response: Positive attachment behaviors observed with infant. Support person, : Jose Antonio, present.   Plan: Continue with the plan of care.

## 2021-10-10 ENCOUNTER — HEALTH MAINTENANCE LETTER (OUTPATIENT)
Age: 26
End: 2021-10-10

## 2021-11-05 NOTE — PROVIDER NOTIFICATION
08/28/21 0330   Fetal Assessment   Fetal Movement active   Fetal HR Assessment Method external US   Fetal HR (beats/min) 140   Fetal Heart Baseline Rate normal range   Fetal HR Variability moderate (amplitude range 6 to 25 bpm)   Fetal HR Accelerations greater than/equal to 15 bpm;lasting at least 15 seconds   Fetal HR Decelerations variable;recurrent   RN Strip Review Continuous   Intrauterine Corrective Measures IV Fluid bolus;Reposition   Dr. Schaeffer in room and aware of decelerations and is consenting patient for a C/S.    Pt rescheduled and MD message relayed.  Pt will discontinue all vitamin supplements.

## 2021-12-05 ENCOUNTER — HEALTH MAINTENANCE LETTER (OUTPATIENT)
Age: 26
End: 2021-12-05

## 2022-01-26 NOTE — PROGRESS NOTES
OBGYN Postpartum Progress Note    *patient seen with the help of a [employer] ipad  today*   SUBJECTIVE   Armida Da Silva is a 26 year old  postpartum from CS delivery on 2021.  See hospital note for further details.    Since delivery, she notes that she has been good. She is breastfeeding. She has not had sex yet since the delivery. Does not have any issues with vaginal dryness. Does not have any mood concerns.     Patient does have diagnoses of T2DM and chronic hypertension. It was recommended that she follow up with a Primary care provider in the postpartum period but she has not been able to do this yet. She is open to do this.     Per chart review, the discharge recommendations for her T2DM were:    -  Recommend 6 week postpartum re-check with PCP/CDE for OGTT    -  q3 month A1c given increased risk for return to T2DM  (previously well controlled prior to pregnancy)    -  BG home monitoring of BID -QID rotating times (/)    -  Keep a good journal of BG to have for follow up appointment    -  Hx of Metformin 850 mg daily - will not recommend re-starting at this time.  Will defer to the 6 week follow up as noted above. Will likely require re-initiation given PCOS dx    Medications  Current Outpatient Medications   Medication     Prenatal Vit-Fe Fumarate-FA (PRENATAL VITAMIN) 27-0.8 MG TABS     No current facility-administered medications for this visit.       Allergies   No Known Allergies      Review of Systems  Complete ROS otherwise normal     OBJECTIVE   /81   Pulse 89   Wt 112 kg (247 lb)   Breastfeeding Yes   BMI 39.87 kg/m    BMI: Body mass index is 39.87 kg/m .  General:  Alert, no distress   Head:  Normocephalic, without obvious abnormality   Breasts: Not indicated   Lungs:  No increased work of breathing   Heart:  Well perfused   Abdomen:  Soft, non-tender, well healed low transverse incisional scar   Pelvic: Deferred   Extremities:  normal       ASSESSMENT   Armida GRIGGS  Rekha is a 26 year old , postpartum from  section in 2021.    PLAN     -- Return to fertility reviewed. Reviewed importance of blood sugar optimization before getting pregnancy again. Patient plans nothing for contraception. Reviewed pregnancy spacing.   -- Return as needed or at time interval for next routine pap, pelvic, or breast exam.  -- Continue a multi vitamin supplement, especially if breastfeeding.  -- Pap smear was not obtained, most recently collected 2020 NIL.   -- Will order GTT and A1c for lab orders. Referral to Primary care provider for T2DM and HTN optimization and management     RTC 1 year or prn     Addie Haynes MD MPH  OBGYN PGY-1  22  9:54 AM    The patient was seen and examined with Dr. Haynes.  I have reviewed and edited the above note.     Martha Flores MD, FACOG

## 2022-01-27 ENCOUNTER — OFFICE VISIT (OUTPATIENT)
Dept: OBGYN | Facility: CLINIC | Age: 27
End: 2022-01-27
Attending: OBSTETRICS & GYNECOLOGY
Payer: COMMERCIAL

## 2022-01-27 VITALS
WEIGHT: 247 LBS | DIASTOLIC BLOOD PRESSURE: 81 MMHG | SYSTOLIC BLOOD PRESSURE: 122 MMHG | HEART RATE: 89 BPM | BODY MASS INDEX: 39.87 KG/M2

## 2022-01-27 DIAGNOSIS — Z98.891 HISTORY OF CESAREAN DELIVERY: ICD-10-CM

## 2022-01-27 DIAGNOSIS — E66.01 MORBID OBESITY (H): ICD-10-CM

## 2022-01-27 DIAGNOSIS — E11.9 TYPE 2 DIABETES MELLITUS WITHOUT COMPLICATION, WITHOUT LONG-TERM CURRENT USE OF INSULIN (H): ICD-10-CM

## 2022-01-27 DIAGNOSIS — Z00.00 ANNUAL PHYSICAL EXAM: Primary | ICD-10-CM

## 2022-01-27 DIAGNOSIS — N92.6 IRREGULAR PERIODS: ICD-10-CM

## 2022-01-27 LAB
HCG UR QL: NEGATIVE
INTERNAL QC OK POCT: NORMAL
POCT KIT EXPIRATION DATE: NORMAL
POCT KIT LOT NUMBER: NORMAL

## 2022-01-27 PROCEDURE — G0463 HOSPITAL OUTPT CLINIC VISIT: HCPCS

## 2022-01-27 PROCEDURE — 81025 URINE PREGNANCY TEST: CPT | Performed by: OBSTETRICS & GYNECOLOGY

## 2022-01-27 PROCEDURE — 99213 OFFICE O/P EST LOW 20 MIN: CPT | Performed by: OBSTETRICS & GYNECOLOGY

## 2022-01-27 ASSESSMENT — PAIN SCALES - GENERAL: PAINLEVEL: NO PAIN (0)

## 2022-01-27 NOTE — LETTER
2022       RE: Armida Da Silva   [address]  [facility] [address]     Dear Colleague,    Thank you for referring your patient, Armida Da Silva, to the [facility] [place] at [facility]. Please see a copy of my visit note below.    OBGYN Postpartum Progress Note    *patient seen with the help of a [employer] ipad  today*   SUBJECTIVE   Armida Da Silva is a 26 year old  postpartum from CS delivery on 2021.  See hospital note for further details.    Since delivery, she notes that she has been good. She is breastfeeding. She has not had sex yet since the delivery. Does not have any issues with vaginal dryness. Does not have any mood concerns.     Patient does have diagnoses of T2DM and chronic hypertension. It was recommended that she follow up with a Primary care provider in the postpartum period but she has not been able to do this yet. She is open to do this.     Per chart review, the discharge recommendations for her T2DM were:    -  Recommend 6 week postpartum re-check with PCP/CDE for OGTT    -  q3 month A1c given increased risk for return to T2DM  (previously well controlled prior to pregnancy)    -  BG home monitoring of BID -QID rotating times (/)    -  Keep a good journal of BG to have for follow up appointment    -  Hx of Metformin 850 mg daily - will not recommend re-starting at this time.  Will defer to the 6 week follow up as noted above. Will likely require re-initiation given PCOS dx    Medications  Current Outpatient Medications   Medication     Prenatal Vit-Fe Fumarate-FA (PRENATAL VITAMIN) 27-0.8 MG TABS     No current facility-administered medications for this visit.       Allergies   No Known Allergies      Review of Systems  Complete ROS otherwise normal     OBJECTIVE   /81   Pulse 89   Wt 112 kg (247 lb)   Breastfeeding Yes   BMI 39.87 kg/m    BMI: Body mass index is 39.87  kg/m .  General:  Alert, no distress   Head:  Normocephalic, without obvious abnormality   Breasts: Not indicated   Lungs:  No increased work of breathing   Heart:  Well perfused   Abdomen:  Soft, non-tender, well healed low transverse incisional scar   Pelvic: Deferred   Extremities:  normal       ASSESSMENT   Armida Da Silva is a 26 year old , postpartum from  section in 2021.    PLAN     -- Return to fertility reviewed. Reviewed importance of blood sugar optimization before getting pregnancy again. Patient plans nothing for contraception. Reviewed pregnancy spacing.   -- Return as needed or at time interval for next routine pap, pelvic, or breast exam.  -- Continue a multi vitamin supplement, especially if breastfeeding.  -- Pap smear was not obtained, most recently collected 2020 NIL.   -- Will order GTT and A1c for lab orders. Referral to Primary care provider for T2DM and HTN optimization and management     RTC 1 year or prn     Addie Haynes MD MPH  OBGYN PGY-1  22  9:54 AM    The patient was seen and examined with Dr. Haynes.  I have reviewed and edited the above note.     Martha Flores MD, FACOG

## 2022-03-17 ENCOUNTER — LAB (OUTPATIENT)
Dept: LAB | Facility: CLINIC | Age: 27
End: 2022-03-17
Payer: COMMERCIAL

## 2022-03-17 DIAGNOSIS — E11.9 TYPE 2 DIABETES MELLITUS WITHOUT COMPLICATION, WITHOUT LONG-TERM CURRENT USE OF INSULIN (H): ICD-10-CM

## 2022-03-17 LAB
HBA1C MFR BLD: 5.8 % (ref 0–5.6)
NON GESTATIONAL GTT 2 HR POST DOSE: 151 MG/DL (ref 60–199)
NON GESTATIONAL GTT FASTING: 105 MG/DL (ref 60–125)

## 2022-03-17 PROCEDURE — 82947 ASSAY GLUCOSE BLOOD QUANT: CPT

## 2022-03-17 PROCEDURE — 82950 GLUCOSE TEST: CPT

## 2022-03-17 PROCEDURE — 83036 HEMOGLOBIN GLYCOSYLATED A1C: CPT

## 2022-03-17 PROCEDURE — 36415 COLL VENOUS BLD VENIPUNCTURE: CPT

## 2022-07-17 ENCOUNTER — HEALTH MAINTENANCE LETTER (OUTPATIENT)
Age: 27
End: 2022-07-17

## 2022-09-18 ENCOUNTER — HEALTH MAINTENANCE LETTER (OUTPATIENT)
Age: 27
End: 2022-09-18

## 2022-10-21 ENCOUNTER — LAB REQUISITION (OUTPATIENT)
Dept: LAB | Facility: CLINIC | Age: 27
End: 2022-10-21
Payer: COMMERCIAL

## 2022-10-21 DIAGNOSIS — M79.89 OTHER SPECIFIED SOFT TISSUE DISORDERS: ICD-10-CM

## 2022-10-21 DIAGNOSIS — R53.83 OTHER FATIGUE: ICD-10-CM

## 2022-10-21 LAB
ALBUMIN SERPL BCG-MCNC: 4 G/DL (ref 3.5–5.2)
ALP SERPL-CCNC: 103 U/L (ref 35–104)
ALT SERPL W P-5'-P-CCNC: 14 U/L (ref 10–35)
AST SERPL W P-5'-P-CCNC: 21 U/L (ref 10–35)
BILIRUB DIRECT SERPL-MCNC: <0.2 MG/DL (ref 0–0.3)
BILIRUB SERPL-MCNC: 0.3 MG/DL
CREAT UR-MCNC: 92.2 MG/DL
MICROALBUMIN UR-MCNC: <12 MG/L
MICROALBUMIN/CREAT UR: NORMAL MG/G{CREAT}
PROT SERPL-MCNC: 7.3 G/DL (ref 6.4–8.3)
TSH SERPL DL<=0.005 MIU/L-ACNC: 3.85 UIU/ML (ref 0.3–4.2)

## 2022-10-21 PROCEDURE — 84443 ASSAY THYROID STIM HORMONE: CPT | Performed by: FAMILY MEDICINE

## 2022-10-21 PROCEDURE — 82040 ASSAY OF SERUM ALBUMIN: CPT | Mod: ORL | Performed by: FAMILY MEDICINE

## 2022-10-21 PROCEDURE — 82570 ASSAY OF URINE CREATININE: CPT | Mod: ORL | Performed by: FAMILY MEDICINE

## 2023-01-29 ENCOUNTER — HEALTH MAINTENANCE LETTER (OUTPATIENT)
Age: 28
End: 2023-01-29

## 2023-03-21 ENCOUNTER — LAB REQUISITION (OUTPATIENT)
Dept: LAB | Facility: CLINIC | Age: 28
End: 2023-03-21
Payer: COMMERCIAL

## 2023-03-21 DIAGNOSIS — D64.9 ANEMIA, UNSPECIFIED: ICD-10-CM

## 2023-03-21 LAB
RETICS # AUTO: 0.06 10E6/UL (ref 0.03–0.1)
RETICS/RBC NFR AUTO: 1.4 % (ref 0.5–2)
VIT B12 SERPL-MCNC: 383 PG/ML (ref 232–1245)

## 2023-03-21 PROCEDURE — 85045 AUTOMATED RETICULOCYTE COUNT: CPT | Mod: ORL | Performed by: FAMILY MEDICINE

## 2023-03-21 PROCEDURE — 82306 VITAMIN D 25 HYDROXY: CPT | Performed by: FAMILY MEDICINE

## 2023-03-21 PROCEDURE — 82607 VITAMIN B-12: CPT | Mod: ORL | Performed by: FAMILY MEDICINE

## 2023-03-21 PROCEDURE — 82728 ASSAY OF FERRITIN: CPT | Mod: ORL | Performed by: FAMILY MEDICINE

## 2023-03-22 LAB
DEPRECATED CALCIDIOL+CALCIFEROL SERPL-MC: 32 UG/L (ref 20–75)
FERRITIN SERPL-MCNC: 48 NG/ML (ref 6–175)

## 2023-03-27 ENCOUNTER — ANCILLARY PROCEDURE (OUTPATIENT)
Dept: ULTRASOUND IMAGING | Facility: CLINIC | Age: 28
End: 2023-03-27
Attending: INTERNAL MEDICINE
Payer: COMMERCIAL

## 2023-03-27 DIAGNOSIS — R60.0 EDEMA OF LOWER EXTREMITY: ICD-10-CM

## 2023-03-27 PROCEDURE — 93971 EXTREMITY STUDY: CPT | Mod: LT | Performed by: RADIOLOGY

## 2023-03-29 ENCOUNTER — LAB REQUISITION (OUTPATIENT)
Dept: LAB | Facility: CLINIC | Age: 28
End: 2023-03-29
Payer: COMMERCIAL

## 2023-03-29 DIAGNOSIS — N89.8 OTHER SPECIFIED NONINFLAMMATORY DISORDERS OF VAGINA: ICD-10-CM

## 2023-03-29 PROCEDURE — 87491 CHLMYD TRACH DNA AMP PROBE: CPT | Mod: ORL | Performed by: INTERNAL MEDICINE

## 2023-03-29 PROCEDURE — 87591 N.GONORRHOEAE DNA AMP PROB: CPT | Mod: ORL | Performed by: INTERNAL MEDICINE

## 2023-03-30 LAB
C TRACH DNA SPEC QL NAA+PROBE: NEGATIVE
N GONORRHOEA DNA SPEC QL NAA+PROBE: NEGATIVE

## 2023-05-08 ENCOUNTER — HEALTH MAINTENANCE LETTER (OUTPATIENT)
Age: 28
End: 2023-05-08

## 2023-06-22 ENCOUNTER — OFFICE VISIT (OUTPATIENT)
Dept: OBGYN | Facility: CLINIC | Age: 28
End: 2023-06-22
Attending: OBSTETRICS & GYNECOLOGY
Payer: COMMERCIAL

## 2023-06-22 VITALS
DIASTOLIC BLOOD PRESSURE: 82 MMHG | HEIGHT: 66 IN | BODY MASS INDEX: 42.52 KG/M2 | SYSTOLIC BLOOD PRESSURE: 125 MMHG | HEART RATE: 73 BPM | WEIGHT: 264.6 LBS

## 2023-06-22 DIAGNOSIS — Z12.4 SCREENING FOR CERVICAL CANCER: ICD-10-CM

## 2023-06-22 DIAGNOSIS — M79.89 LEG SWELLING: ICD-10-CM

## 2023-06-22 DIAGNOSIS — Z01.419 ENCOUNTER FOR GYNECOLOGICAL EXAMINATION WITHOUT ABNORMAL FINDING: Primary | ICD-10-CM

## 2023-06-22 DIAGNOSIS — N93.9 ABNORMAL UTERINE BLEEDING (AUB): ICD-10-CM

## 2023-06-22 PROCEDURE — 99213 OFFICE O/P EST LOW 20 MIN: CPT | Performed by: OBSTETRICS & GYNECOLOGY

## 2023-06-22 PROCEDURE — G0145 SCR C/V CYTO,THINLAYER,RESCR: HCPCS | Performed by: OBSTETRICS & GYNECOLOGY

## 2023-06-22 NOTE — PATIENT INSTRUCTIONS
Thank you for trusting us with your care!     If you need to contact us for questions about:  Symptoms, Scheduling & Medical Questions; Non-urgent (2-3 day response) Angelina message, Urgent (needing response today) [phone] (if after 3:30pm next day response)   Prescriptions: Please call your Pharmacy   Billing: Anne [phone] or [facility]:[phone]

## 2023-06-22 NOTE — PROGRESS NOTES
Progress Note    SUBJECTIVE:  Armida Da Silva is an 28 year old  , who requests a breast and pelvic exam. The visit was conducted with a Chilean  over the phone.  Her baby is now almost 2 and doing well.  Stopped breastfeeding 2 months ago.  Periods have returned but are not regular, every other month, last month twice.  She is planning another pregnancy.  Her main concern today is left leg swelling-sudden onset 3 months ago with no inciting factor that she can think of.  Had a normal left LE doppler ultrasound in 3/23.  Her primary care is a [facility]-when asked what the follow up was after the ultrasound she states that she was told it was normal with no other recommendations.      Patient is followed by Dr. Thakkar at [facility] for primary care, type II DM.      Menstrual History:      2021     1:45 PM 2021     2:07 PM   Menstrual History   LAST MENSTRUAL PERIOD 2020    Menarche Age  18 years   Period Cycle (Days)  28   Period Duration (Days)  5-7 days   Period Pattern  Regular   Menstrual Flow  Moderate   Dysmenorrhea  Mild   PMS Symptoms  Cramping   Reviewed Today  Yes       Last  Pap:   NILM  History of abnormal Pap smear: NO - age 21-29 PAP every 3 years recommended    Last No results found for: HPV16  Last No results found for: HPV18  Last No results found for: HRHPV    Mammogram current: not applicable    HISTORY:  Prescription Medications as of 2023       Rx Number Disp Refills Start End Last Dispensed Date Next Fill Date Owning Pharmacy    Prenatal Vit-Fe Fumarate-FA (PRENATAL VITAMIN) 27-0.8 MG TABS  90 tablet 3 2021    [facility] Cl[facility] [place], [place] - [address]    Sig: Take 1 tablet by mouth daily    Class: E-Prescribe    Route: Oral        No Known Allergies  Immunization History   Administered Date(s) Administered     COVID-19 Monovalent 18+ (Moderna) 2021, 2021     Hepatitis B, Adult 2021, 2021     Influenza Vaccine >6  "months (Alfuria,Fluzone) 2020     TDAP (Adacel,Boostrix) 2021       OB History    Para Term  AB Living   1 1 1 0 0 1   SAB IAB Ectopic Multiple Live Births   0 0 0 0 1     Past Medical History:   Diagnosis Date     Obesity, BMI 38      PCOS (polycystic ovarian syndrome)      Pre-diabetes      Past Surgical History:   Procedure Laterality Date      SECTION N/A 2021    Procedure:  SECTION;  Surgeon: Natalie Schaeffer MD;  Location: [facility]     NO HISTORY OF SURGERY       Family History   Problem Relation Age of Onset     Diabetes No family hx of      Hyperlipidemia No family hx of      Hypertension No family hx of      Cerebrovascular Disease No family hx of      Coronary Artery Disease No family hx of      Depression No family hx of      Anxiety Disorder No family hx of      Social History     Socioeconomic History     Marital status:      Spouse name: Claudia Yepez     Number of children: None     Years of education: None     Highest education level: None   Tobacco Use     Smoking status: Never     Smokeless tobacco: Never   Substance and Sexual Activity     Alcohol use: Never     Drug use: Never     Sexual activity: Yes     Partners: Male     Birth control/protection: None       ROS    EXAM:  Blood pressure 125/82, pulse 73, height 1.676 m (5' 6\"), weight 120 kg (264 lb 9.6 oz), currently breastfeeding. Body mass index is 42.71 kg/m .  General appearance: Pleasant female in no acute distress.     BREAST EXAM:  Breast: Without visible skin changes. No dimpling or lesions seen.   Breasts supple, non-tender with palpation, no dominant mass, nodularity, or nipple discharge noted bilaterally. Axillary nodes negative.      PELVIC EXAM:  EG/BUS: Normal genital architecture without lesions, erythema or abnormal secretions Bartholin's, Urethra, [place]'s normal   Urethral meatus: normal   Urethra: no masses, tenderness, or scarring   Bladder: no masses or tenderness "   Vagina: moist, pink, rugae with physiologic discharge  secretions  Cervix: Nulliparous, and no lesions  Uterus: anteverted,  and small, smooth, firm, mobile w/o pain  Adnexa: Within normal limits and No masses, nodularity, tenderness  Rectum:anus normal   extr-left LE with markedly increased edema as compared to the right-from below the knee to the dorsum of the foot, thighs are symmetric, mild, diffuse tenderness.  Both legs with varicose veins.        ASSESSMENT:  Encounter Diagnoses   Name Primary?     Encounter for gynecological examination without abnormal finding Yes     Abnormal uterine bleeding (AUB)      Screening for cervical cancer      Leg swelling         PLAN:   Orders Placed This Encounter   Procedures     Pelvic and Breast Exam Procedure []     Obtaining, preparing and conveyance of cervical or vaginal smear to laboratory.     US Pelvic Complete with Transvaginal     US Lower Extremity Venous Duplex Left     Discussed that irregular periods are normal during breastfeeding and that with just stopping, would wait a few months to see if they normalize.  A pelvic ultrasound to evaluate the endometrial stripe is reasonable but would defer other fertility testing for 3-4 months.     Repeat left LE doppler ultrasound.  If negative would consider referral to vein clinic for further evaluation.       Verbalized understanding and agreement with visit plan.    Martha Flores MD, FACOG

## 2023-06-22 NOTE — LETTER
2023       RE: Armida Da Silva   [address]  [facility] [address]     Dear Colleague,    Thank you for referring your patient, Armida Da Silva, to the [facility] [place] at [facility]. Please see a copy of my visit note below.      Progress Note    SUBJECTIVE:  Armida Da Silva is an 28 year old  , who requests a breast and pelvic exam. The visit was conducted with a Turkmen  over the phone.  Her baby is now almost 2 and doing well.  Stopped breastfeeding 2 months ago.  Periods have returned but are not regular, every other month, last month twice.  She is planning another pregnancy.  Her main concern today is left leg swelling-sudden onset 3 months ago with no inciting factor that she can think of.  Had a normal left LE doppler ultrasound in 3/23.  Her primary care is a [facility]-when asked what the follow up was after the ultrasound she states that she was told it was normal with no other recommendations.      Patient is followed by Dr. Thakkar at [facility] for primary care, type II DM.      Menstrual History:      2021     1:45 PM 2021     2:07 PM   Menstrual History   LAST MENSTRUAL PERIOD 2020    Menarche Age  18 years   Period Cycle (Days)  28   Period Duration (Days)  5-7 days   Period Pattern  Regular   Menstrual Flow  Moderate   Dysmenorrhea  Mild   PMS Symptoms  Cramping   Reviewed Today  Yes       Last  Pap:   NILM  History of abnormal Pap smear: NO - age 21-29 PAP every 3 years recommended    Last No results found for: HPV16  Last No results found for: HPV18  Last No results found for: HRHPV    Mammogram current: not applicable    HISTORY:  Prescription Medications as of 2023         Rx Number Disp Refills Start End Last Dispensed Date Next Fill Date Owning Pharmacy    Prenatal Vit-Fe Fumarate-FA (PRENATAL VITAMIN) 27-0.8 MG TABS  90 tablet 3 2021    [facility] Cl[facility] -  [facility], [place] [facility] [address]    Sig: Take 1 tablet by mouth daily    Class: E-Prescribe    Route: Oral          No Known Allergies  Immunization History   Administered Date(s) Administered    COVID-19 Monovalent 18+ (Moderna) 2021, 2021    Hepatitis B, Adult 2021, 2021    Influenza Vaccine >6 months (Alfuria,Fluzone) 2020    TDAP (Adacel,Boostrix) 2021       OB History    Para Term  AB Living   1 1 1 0 0 1   SAB IAB Ectopic Multiple Live Births   0 0 0 0 1     Past Medical History:   Diagnosis Date    Obesity, BMI 38     PCOS (polycystic ovarian syndrome)     Pre-diabetes      Past Surgical History:   Procedure Laterality Date     SECTION N/A 2021    Procedure:  SECTION;  Surgeon: Natalie Schaeffer MD;  Location: [facility]    NO HISTORY OF SURGERY       Family History   Problem Relation Age of Onset    Diabetes No family hx of     Hyperlipidemia No family hx of     Hypertension No family hx of     Cerebrovascular Disease No family hx of     Coronary Artery Disease No family hx of     Depression No family hx of     Anxiety Disorder No family hx of      Social History     Socioeconomic History    Marital status:      Spouse name: Claudia Yepez    Number of children: None    Years of education: None    Highest education level: None   Tobacco Use    Smoking status: Never    Smokeless tobacco: Never   Substance and Sexual Activity    Alcohol use: Never    Drug use: Never    Sexual activity: Yes     Partners: Male     Birth control/protection: None       ROS    EXAM:  Blood pressure 125/82, pulse 73, height 1.676 m (5' 6\"), weight 120 kg (264 lb 9.6 oz), currently breastfeeding. Body mass index is 42.71 kg/m .  General appearance: Pleasant female in no acute distress.     BREAST EXAM:  Breast: Without visible skin changes. No dimpling or lesions seen.   Breasts supple, non-tender with palpation, no dominant mass, nodularity, or nipple " discharge noted bilaterally. Axillary nodes negative.      PELVIC EXAM:  EG/BUS: Normal genital architecture without lesions, erythema or abnormal secretions Bartholin's, Urethra, [place]'s normal   Urethral meatus: normal   Urethra: no masses, tenderness, or scarring   Bladder: no masses or tenderness   Vagina: moist, pink, rugae with physiologic discharge  secretions  Cervix: Nulliparous, and no lesions  Uterus: anteverted,  and small, smooth, firm, mobile w/o pain  Adnexa: Within normal limits and No masses, nodularity, tenderness  Rectum:anus normal   extr-left LE with markedly increased edema as compared to the right-from below the knee to the dorsum of the foot, thighs are symmetric, mild, diffuse tenderness.  Both legs with varicose veins.        ASSESSMENT:  Encounter Diagnoses   Name Primary?    Encounter for gynecological examination without abnormal finding Yes    Abnormal uterine bleeding (AUB)     Screening for cervical cancer     Leg swelling         PLAN:   Orders Placed This Encounter   Procedures    Pelvic and Breast Exam Procedure []    Obtaining, preparing and conveyance of cervical or vaginal smear to laboratory.    US Pelvic Complete with Transvaginal    US Lower Extremity Venous Duplex Left     Discussed that irregular periods are normal during breastfeeding and that with just stopping, would wait a few months to see if they normalize.  A pelvic ultrasound to evaluate the endometrial stripe is reasonable but would defer other fertility testing for 3-4 months.     Repeat left LE doppler ultrasound.  If negative would consider referral to vein clinic for further evaluation.       Verbalized understanding and agreement with visit plan.    Martha Flores MD, FACOG

## 2023-06-26 LAB
BKR LAB AP GYN ADEQUACY: NORMAL
BKR LAB AP GYN INTERPRETATION: NORMAL
BKR LAB AP HPV REFLEX: NORMAL
BKR LAB AP PREVIOUS ABNORMAL: NORMAL
PATH REPORT.COMMENTS IMP SPEC: NORMAL
PATH REPORT.COMMENTS IMP SPEC: NORMAL
PATH REPORT.RELEVANT HX SPEC: NORMAL

## 2023-07-13 ENCOUNTER — ANCILLARY PROCEDURE (OUTPATIENT)
Dept: ULTRASOUND IMAGING | Facility: CLINIC | Age: 28
End: 2023-07-13
Attending: OBSTETRICS & GYNECOLOGY
Payer: COMMERCIAL

## 2023-07-13 DIAGNOSIS — N93.9 ABNORMAL UTERINE BLEEDING (AUB): ICD-10-CM

## 2023-07-13 PROCEDURE — 76856 US EXAM PELVIC COMPLETE: CPT

## 2023-07-13 PROCEDURE — 76856 US EXAM PELVIC COMPLETE: CPT | Mod: 26 | Performed by: OBSTETRICS & GYNECOLOGY

## 2023-07-13 PROCEDURE — 76830 TRANSVAGINAL US NON-OB: CPT | Mod: 26 | Performed by: OBSTETRICS & GYNECOLOGY

## 2023-10-08 ENCOUNTER — HEALTH MAINTENANCE LETTER (OUTPATIENT)
Age: 28
End: 2023-10-08

## 2024-01-30 ENCOUNTER — OFFICE VISIT (OUTPATIENT)
Dept: OBGYN | Facility: CLINIC | Age: 29
End: 2024-01-30
Attending: OBSTETRICS & GYNECOLOGY
Payer: COMMERCIAL

## 2024-01-30 VITALS
HEIGHT: 66 IN | SYSTOLIC BLOOD PRESSURE: 127 MMHG | HEART RATE: 82 BPM | DIASTOLIC BLOOD PRESSURE: 86 MMHG | BODY MASS INDEX: 45 KG/M2 | WEIGHT: 280 LBS

## 2024-01-30 DIAGNOSIS — N93.9 ABNORMAL UTERINE BLEEDING (AUB): ICD-10-CM

## 2024-01-30 DIAGNOSIS — Z31.41 ENCOUNTER FOR FERTILITY TESTING: Primary | ICD-10-CM

## 2024-01-30 PROCEDURE — 99213 OFFICE O/P EST LOW 20 MIN: CPT | Mod: GC | Performed by: OBSTETRICS & GYNECOLOGY

## 2024-01-30 PROCEDURE — G0463 HOSPITAL OUTPT CLINIC VISIT: HCPCS | Performed by: OBSTETRICS & GYNECOLOGY

## 2024-01-30 PROCEDURE — 81025 URINE PREGNANCY TEST: CPT | Performed by: OBSTETRICS & GYNECOLOGY

## 2024-01-30 NOTE — LETTER
"2024       RE: Armida Whatley  [address]  [place] [address]     Dear Colleague,    Thank you for referring your patient, Armida Whatley, to the [facility] [place] at [facility]. Please see a copy of my visit note below.    [facility]  Gynecology Visit    Reason for Visit: AUB    HPI:    Armida Whatley is a 28 year old , here for irregular periods.  Last month her period started on the  and lasted 9 days. For the first 5-7 days she was changing a pad every hour. It was abnormally painful but improved with tylenol. This month she has not gotten her period. Of note she reporst she gets her period every month, initially stating it came every 4 weeks. However, after trying to clarify with  I presume she gets her period at some point in the every 4 week window as she says sometimes it is at the beginning of the month, sometimes the middle.  She wants a regular period because she wants to get pregnant.    GYN History  Length of menstrual cycle: irregular  Duration of menses: 5 days  Heavy bleeding: pad change every 2 hours  Pain with menses: normally no  LMP:   Sexually active: yes, with 1 male partner  History of STIs: no  Pap Smears: 2023 NILM   No results found for: \"PAP\"  History of abnormal paps: none  Contraception: none    OBHx  OB History    Para Term  AB Living   1 1 1 0 0 1   SAB IAB Ectopic Multiple Live Births   0 0 0 0 1      # Outcome Date GA Lbr Reese/2nd Weight Sex Delivery Anes PTL Lv   1 Term 21 39w1d  3.23 kg (7 lb 1.9 oz) M CS-LTranv   JAYDEN      Complications: Dysfunctional Labor, Preeclampsia/Hypertension, Failure to Progress in First Stage      Name: PEDRO WHATLEY      Apgar1: 9  Apgar5: 10       Past Medical History:   Diagnosis Date    Obesity, BMI 45     PCOS (polycystic ovarian syndrome)     Pre-diabetes        Past Surgical History:   Procedure Laterality " "Date     SECTION N/A 2021    Procedure:  SECTION;  Surgeon: Natalie Schaeffer MD;  Location: [facility]    NO HISTORY OF SURGERY           Current Outpatient Medications:     Prenatal Vit-Fe Fumarate-FA (PRENATAL VITAMIN) 27-0.8 MG TABS, Take 1 tablet by mouth daily, Disp: 90 tablet, Rfl: 3    No Known Allergies    Family History   Problem Relation Age of Onset    Diabetes No family hx of     Hyperlipidemia No family hx of     Hypertension No family hx of     Cerebrovascular Disease No family hx of     Coronary Artery Disease No family hx of     Depression No family hx of     Anxiety Disorder No family hx of        Social History     Socioeconomic History    Marital status:      Spouse name: Claudia Yepez    Number of children: Not on file    Years of education: Not on file    Highest education level: Not on file   Occupational History    Not on file   Tobacco Use    Smoking status: Never    Smokeless tobacco: Never   Substance and Sexual Activity    Alcohol use: Never    Drug use: Never    Sexual activity: Yes     Partners: Male     Birth control/protection: None   Other Topics Concern    Not on file   Social History Narrative    Not on file     Social Determinants of Health     Financial Resource Strain: Not on file   Food Insecurity: Not on file   Transportation Needs: Not on file   Physical Activity: Not on file   Stress: Not on file   Social Connections: Not on file   Interpersonal Safety: Not on file   Housing Stability: Not on file       ROS: 10-Point ROS negative except as noted in HPI    Physical Exam  /86   Pulse 82   Ht 1.676 m (5' 6\")   Wt 127 kg (280 lb)   LMP 2023   BMI 45.19 kg/m    Gen: Well-appearing, NAD  HEENT: Normocephalic, atraumatic  CV:  Regular rate, well perfused  Pulm: Normal work of breathing on room air    Assessment/Plan:  Armida Da Silva is a 28 year old  female here for AUB in the setting of desiring pregnancy.     Fertility " workup  Discussed that most medications that regulate a period are also birth control so priority is investigating fertility rather than regulating period as patient desires pregnancy.  - ordered TSH, prolactin, hgb A1C for anytime (can get with day 3 labs)  - ordered FSH, estradiol, AMH for day 3 of next cycle  - ordered progesterone for day 21 of next cycle  - patient had a pelvic US this summer, no need to repeat    Defer SA and HSG until the above have been completed.     Schedule a visit in 4-5 weeks once labs have been done and resulted. Can be phone visit.   Staffed with Dr. Mark Carlos MD  Obstetrics & Gynecology, PGY-1  01/30/2024 6:28 PM    The patient was seen and evaluated with Dr. Carlos.  I have reviewed and edited the above note.    Martha Flores MD, FACOG

## 2024-02-25 ENCOUNTER — HEALTH MAINTENANCE LETTER (OUTPATIENT)
Age: 29
End: 2024-02-25

## 2024-02-29 ENCOUNTER — LAB (OUTPATIENT)
Dept: LAB | Facility: CLINIC | Age: 29
End: 2024-02-29
Payer: COMMERCIAL

## 2024-02-29 DIAGNOSIS — N93.9 ABNORMAL UTERINE BLEEDING (AUB): ICD-10-CM

## 2024-02-29 LAB
ESTRADIOL SERPL-MCNC: 37 PG/ML
FSH SERPL IRP2-ACNC: 7.2 MIU/ML
HBA1C MFR BLD: 8.8 %
MIS SERPL-MCNC: 5.02 NG/ML (ref 0.89–9.9)
PROGEST SERPL-MCNC: 0.1 NG/ML
PROLACTIN SERPL 3RD IS-MCNC: 4 NG/ML (ref 5–23)
TSH SERPL DL<=0.005 MIU/L-ACNC: 3 UIU/ML (ref 0.3–4.2)

## 2024-02-29 PROCEDURE — 84144 ASSAY OF PROGESTERONE: CPT

## 2024-02-29 PROCEDURE — 36415 COLL VENOUS BLD VENIPUNCTURE: CPT

## 2024-02-29 PROCEDURE — 83001 ASSAY OF GONADOTROPIN (FSH): CPT

## 2024-02-29 PROCEDURE — 82670 ASSAY OF TOTAL ESTRADIOL: CPT

## 2024-02-29 PROCEDURE — 83036 HEMOGLOBIN GLYCOSYLATED A1C: CPT

## 2024-02-29 PROCEDURE — 84443 ASSAY THYROID STIM HORMONE: CPT

## 2024-02-29 PROCEDURE — 82166 ASSAY ANTI-MULLERIAN HORM: CPT

## 2024-02-29 PROCEDURE — 84146 ASSAY OF PROLACTIN: CPT

## 2024-03-11 ENCOUNTER — LAB REQUISITION (OUTPATIENT)
Dept: LAB | Facility: CLINIC | Age: 29
End: 2024-03-11
Payer: COMMERCIAL

## 2024-03-11 DIAGNOSIS — E11.9 TYPE 2 DIABETES MELLITUS WITHOUT COMPLICATIONS (H): ICD-10-CM

## 2024-03-11 LAB
CREAT UR-MCNC: 64.6 MG/DL
MICROALBUMIN UR-MCNC: 13.1 MG/L
MICROALBUMIN/CREAT UR: 20.28 MG/G CR (ref 0–25)

## 2024-03-11 PROCEDURE — 82570 ASSAY OF URINE CREATININE: CPT | Mod: ORL | Performed by: FAMILY MEDICINE

## 2024-03-18 ENCOUNTER — TELEPHONE (OUTPATIENT)
Dept: OBGYN | Facility: CLINIC | Age: 29
End: 2024-03-18
Payer: COMMERCIAL

## 2024-03-18 NOTE — TELEPHONE ENCOUNTER
[facility] Call Center    Phone Message    May a detailed message be left on voicemail: yes     Reason for Call:  Pt spouse Anali calling stating pt was instructed by Mark to get lab done on the 21st day from pt last cycle which is today. Pt was unable to go to lab today and wanting to know if doing the lab tomorrow will be okay. Please call Anali.     Action Taken: Message routed to:  Other: [facility]    Travel Screening: Not Applicable

## 2024-03-19 ENCOUNTER — APPOINTMENT (OUTPATIENT)
Dept: LAB | Facility: CLINIC | Age: 29
End: 2024-03-19
Payer: COMMERCIAL

## 2024-03-19 PROCEDURE — 84144 ASSAY OF PROGESTERONE: CPT | Performed by: OBSTETRICS & GYNECOLOGY

## 2024-03-19 NOTE — TELEPHONE ENCOUNTER
Called and spoke with Anali to let him know that Armida can have her 21 day lab drawn day, which is a day late.    All questions answered.

## 2024-05-06 ENCOUNTER — OFFICE VISIT (OUTPATIENT)
Dept: OBGYN | Facility: CLINIC | Age: 29
End: 2024-05-06
Attending: OBSTETRICS & GYNECOLOGY
Payer: COMMERCIAL

## 2024-05-06 VITALS
WEIGHT: 279 LBS | BODY MASS INDEX: 45.03 KG/M2 | SYSTOLIC BLOOD PRESSURE: 129 MMHG | HEART RATE: 94 BPM | DIASTOLIC BLOOD PRESSURE: 84 MMHG

## 2024-05-06 DIAGNOSIS — N92.6 IRREGULAR PERIODS: Primary | ICD-10-CM

## 2024-05-06 DIAGNOSIS — E11.00 TYPE 2 DIABETES MELLITUS WITH HYPEROSMOLARITY WITHOUT COMA, WITHOUT LONG-TERM CURRENT USE OF INSULIN (H): ICD-10-CM

## 2024-05-06 DIAGNOSIS — B37.31 YEAST INFECTION OF THE VAGINA: ICD-10-CM

## 2024-05-06 PROBLEM — E11.9 DIABETES MELLITUS, TYPE 2 (H): Status: ACTIVE | Noted: 2024-05-06

## 2024-05-06 LAB
BACTERIAL VAGINOSIS VAG-IMP: NEGATIVE
CANDIDA DNA VAG QL NAA+PROBE: DETECTED
CANDIDA GLABRATA / CANDIDA KRUSEI DNA: NOT DETECTED
T VAGINALIS DNA VAG QL NAA+PROBE: NOT DETECTED

## 2024-05-06 PROCEDURE — 99213 OFFICE O/P EST LOW 20 MIN: CPT | Performed by: OBSTETRICS & GYNECOLOGY

## 2024-05-06 PROCEDURE — G0463 HOSPITAL OUTPT CLINIC VISIT: HCPCS | Performed by: OBSTETRICS & GYNECOLOGY

## 2024-05-06 PROCEDURE — 0352U MULTIPLEX VAGINAL PANEL BY PCR: CPT | Performed by: OBSTETRICS & GYNECOLOGY

## 2024-05-06 NOTE — PROGRESS NOTES
"S:  Pt is a 28 y/o  who presents today to follow up irregular periods and secondary infertility.  A [employer]  over the ipad was used for the visit.  She receives her primary care at [facility].  She states she was started on a medication of diabetes (metformin per chart review) 2 months ago.  She has not had a follow up visit or labs yet.  At the very end of our visit she noted pain with IC-like she is \"being injured\" and has bleeding after.    O:    /84   Pulse 94   Wt 126.6 kg (279 lb)   LMP 05/01/2024   BMI 45.03 kg/m      Gen-pleasant, NAD  Exam-not done as visit was over time when pt noted vaginal symptoms    Labs reviewed:    2/29/24  Day 3 FSH 7.2, estradiol 37  AMH 5.020  Hgb A1c  8.8    3/19/24 day 22 progesterone 0.3    Pelvic U/S - Transabdominal and Transvaginal  [facility]   Referring Provider: Martha Flores MD  Sonographer:  EMMA Schneider  Indication: Abnormal uterine bleeding  LMP: 07/10/2023  History:   Gynecological Ultrasonography:   Uterus: anteverted. Contour is smooth/regular.  Size: 8.2 x 5.5 x 3.6 cm  Endometrium: Thickness Total 3.7 mm  Findings:   Right Ovary: 2.9 x 2.1 x 1.8 cm. Wnl  Left Ovary: 2.7 x 2.0 x 1.5 cm. Wnl  Cul de Sac Free Fluid: No free fluid     Impression:  Normal appearing uterus and endometrial stripe.  Normal pelvic ultrasound.  Further studies as clinically indicated.     Martha Flores MD, FACOG           A:  28 y/o  with anovulation and secondary infertility in the setting of uncontrolled type 2 DM and obesity with BMI 55    P:  Reviewed today that our clinic guidelines for oral ovulation induction treatment requires a BMI of around 40 or less, but more importantly conception at this time with her A1C and BMI would pose significant risks to herself and a potential fetus.  Strongly recommended she work with her primary physician to get her A1C into the normal range prior to pregnancy.  Reviewed the benefits " of weight loss on overall health but that with weight loss alone her cycles may return to normal and she would not require fertility treatment.  Briefly discussed medications for diabetes and weight loss.  She is interested in a referral to the weight management clinic for medical weight loss-order placed today.  She was given information on local [facility] if she would like to consider a referral as other clinics may have other BMI cut offs for treatment, but given the reasons above, optimizing her diabetes and weight will result in a much less high risk pregnancy for herself.      For her vaginal complaints-she will self-collect a vaginal multiplex swab-will send in treatment for vaginitis if indicated.  If negative she can make a follow up appt here or at [facility] for further evaluation.  Plan to return in about 6 months for follow up of diabetes/weight loss/fertility.     Martha Flores MD, FACOG

## 2024-05-06 NOTE — LETTER
"5/6/2024       RE: Armida Da Silva  [address]  [place] [address]     Dear Colleague,    Thank you for referring your patient, Armida Da Silva, to the [facility] [place] at [facility]. Please see a copy of my visit note below.    Chief Complaint   Patient presents with    Follow Up     Lab results    Lou Arcos LPN     S:  Pt is a 30 y/o  who presents today to follow up irregular periods and secondary infertility.  A [employer]eter  over the ipad was used for the visit.  She receives her primary care at [facility].  She states she was started on a medication of diabetes (metformin per chart review) 2 months ago.  She has not had a follow up visit or labs yet.  At the very end of our visit she noted pain with IC-like she is \"being injured\" and has bleeding after.    O:    /84   Pulse 94   Wt 126.6 kg (279 lb)   LMP 05/01/2024   BMI 45.03 kg/m      Gen-pleasant, NAD  Exam-not done as visit was over time when pt noted vaginal symptoms    Labs reviewed:    2/29/24  Day 3 FSH 7.2, estradiol 37  AMH 5.020  Hgb A1c  8.8    3/19/24 day 22 progesterone 0.3    Pelvic U/S - Transabdominal and Transvaginal  [facility]   Referring Provider: Martha Flores MD  Sonographer:  EMMA Schneider  Indication: Abnormal uterine bleeding  LMP: 07/10/2023  History:   Gynecological Ultrasonography:   Uterus: anteverted. Contour is smooth/regular.  Size: 8.2 x 5.5 x 3.6 cm  Endometrium: Thickness Total 3.7 mm  Findings:   Right Ovary: 2.9 x 2.1 x 1.8 cm. Wnl  Left Ovary: 2.7 x 2.0 x 1.5 cm. Wnl  Cul de Sac Free Fluid: No free fluid     Impression:  Normal appearing uterus and endometrial stripe.  Normal pelvic ultrasound.  Further studies as clinically indicated.     Martha Flores MD, FACOG           A:  30 y/o  with anovulation and secondary infertility in the setting of uncontrolled type 2 DM and " obesity with BMI 55    P:  Reviewed today that our clinic guidelines for oral ovulation induction treatment requires a BMI of around 40 or less, but more importantly conception at this time with her A1C and BMI would pose significant risks to herself and a potential fetus.  Strongly recommended she work with her primary physician to get her A1C into the normal range prior to pregnancy.  Reviewed the benefits of weight loss on overall health but that with weight loss alone her cycles may return to normal and she would not require fertility treatment.  Briefly discussed medications for diabetes and weight loss.  She is interested in a referral to the weight management clinic for medical weight loss-order placed today.  She was given information on local [facility] if she would like to consider a referral as other clinics may have other BMI cut offs for treatment, but given the reasons above, optimizing her diabetes and weight will result in a much less high risk pregnancy for herself.      For her vaginal complaints-she will self-collect a vaginal multiplex swab-will send in treatment for vaginitis if indicated.  If negative she can make a follow up appt here or at [facility] for further evaluation.  Plan to return in about 6 months for follow up of diabetes/weight loss/fertility.     Martha Flores MD, FACOG

## 2024-05-07 RX ORDER — FLUCONAZOLE 150 MG/1
150 TABLET ORAL ONCE
Qty: 1 TABLET | Refills: 0 | Status: SHIPPED | OUTPATIENT
Start: 2024-05-07 | End: 2024-05-07

## 2024-05-28 ENCOUNTER — APPOINTMENT (OUTPATIENT)
Dept: INTERPRETER SERVICES | Facility: CLINIC | Age: 29
End: 2024-05-28
Payer: COMMERCIAL

## 2024-07-14 ENCOUNTER — HEALTH MAINTENANCE LETTER (OUTPATIENT)
Age: 29
End: 2024-07-14

## 2024-12-01 ENCOUNTER — HEALTH MAINTENANCE LETTER (OUTPATIENT)
Age: 29
End: 2024-12-01

## 2025-03-15 ENCOUNTER — HEALTH MAINTENANCE LETTER (OUTPATIENT)
Age: 30
End: 2025-03-15

## 2025-06-09 ENCOUNTER — LAB REQUISITION (OUTPATIENT)
Dept: LAB | Facility: CLINIC | Age: 30
End: 2025-06-09
Payer: COMMERCIAL

## 2025-06-09 ENCOUNTER — MEDICAL CORRESPONDENCE (OUTPATIENT)
Dept: HEALTH INFORMATION MANAGEMENT | Facility: CLINIC | Age: 30
End: 2025-06-09

## 2025-06-09 DIAGNOSIS — E11.9 TYPE 2 DIABETES MELLITUS WITHOUT COMPLICATIONS (H): ICD-10-CM

## 2025-06-09 LAB
CHOLEST SERPL-MCNC: 238 MG/DL
FASTING STATUS PATIENT QL REPORTED: ABNORMAL
HDLC SERPL-MCNC: 51 MG/DL
LDLC SERPL CALC-MCNC: 167 MG/DL
NONHDLC SERPL-MCNC: 187 MG/DL
TRIGL SERPL-MCNC: 101 MG/DL

## 2025-06-09 PROCEDURE — 80061 LIPID PANEL: CPT | Mod: ORL | Performed by: FAMILY MEDICINE

## 2025-06-10 ENCOUNTER — TRANSCRIBE ORDERS (OUTPATIENT)
Dept: OTHER | Age: 30
End: 2025-06-10

## 2025-06-10 DIAGNOSIS — E11.9 TYPE 2 DIABETES MELLITUS WITHOUT COMPLICATION, WITHOUT LONG-TERM CURRENT USE OF INSULIN (H): Primary | ICD-10-CM

## 2025-06-11 ENCOUNTER — PATIENT OUTREACH (OUTPATIENT)
Dept: CARE COORDINATION | Facility: CLINIC | Age: 30
End: 2025-06-11
Payer: COMMERCIAL

## 2025-06-28 ENCOUNTER — HEALTH MAINTENANCE LETTER (OUTPATIENT)
Age: 30
End: 2025-06-28

## 2025-07-19 ENCOUNTER — HEALTH MAINTENANCE LETTER (OUTPATIENT)
Age: 30
End: 2025-07-19

## (undated) DEVICE — RETR PANNICULUS TRAXI FOR PT POSITIONING PRS-1030

## (undated) DEVICE — SU VICRYL 0 CT-1 36" J346H

## (undated) DEVICE — STRAP KNEE/BODY 31143004

## (undated) DEVICE — STOCKING SLEEVE COMPRESSION CALF LG

## (undated) DEVICE — GLOVE PROTEXIS BLUE W/NEU-THERA 7.0  2D73EB70

## (undated) DEVICE — SOL WATER IRRIG 1000ML BOTTLE 07139-09

## (undated) DEVICE — SOL NACL 0.9% IRRIG 1000ML BOTTLE 07138-09

## (undated) DEVICE — SU VICRYL 4-0 PS-2 18" UND J496G

## (undated) DEVICE — DRSG ABDOMINAL 07 1/2X8" 7197D

## (undated) DEVICE — PACK C-SECTION LF PL15OTA83B

## (undated) DEVICE — PREP CHLORAPREP 26ML TINTED ORANGE  260815

## (undated) DEVICE — SU MONOCRYL 0 CT-1 36" Y346H

## (undated) DEVICE — GLOVE ESTEEM POWDER FREE SMT 6.5  2D72PT65

## (undated) DEVICE — CATH TRAY FOLEY SURESTEP 16FR WDRAIN BAG STLK LATEX A300316A

## (undated) DEVICE — DRSG STERI STRIP 1/4X3" R1541

## (undated) RX ORDER — ONDANSETRON 2 MG/ML
INJECTION INTRAMUSCULAR; INTRAVENOUS
Status: DISPENSED
Start: 2021-08-28

## (undated) RX ORDER — KETOROLAC TROMETHAMINE 30 MG/ML
INJECTION, SOLUTION INTRAMUSCULAR; INTRAVENOUS
Status: DISPENSED
Start: 2021-08-28

## (undated) RX ORDER — OXYTOCIN/0.9 % SODIUM CHLORIDE 30/500 ML
PLASTIC BAG, INJECTION (ML) INTRAVENOUS
Status: DISPENSED
Start: 2021-08-28

## (undated) RX ORDER — HYDROMORPHONE HCL IN WATER/PF 6 MG/30 ML
PATIENT CONTROLLED ANALGESIA SYRINGE INTRAVENOUS
Status: DISPENSED
Start: 2021-08-28

## (undated) RX ORDER — FENTANYL CITRATE 50 UG/ML
INJECTION, SOLUTION INTRAMUSCULAR; INTRAVENOUS
Status: DISPENSED
Start: 2021-08-28

## (undated) RX ORDER — MORPHINE SULFATE 1 MG/ML
INJECTION, SOLUTION EPIDURAL; INTRATHECAL; INTRAVENOUS
Status: DISPENSED
Start: 2021-08-28